# Patient Record
Sex: FEMALE | Race: BLACK OR AFRICAN AMERICAN | Employment: OTHER | URBAN - METROPOLITAN AREA
[De-identification: names, ages, dates, MRNs, and addresses within clinical notes are randomized per-mention and may not be internally consistent; named-entity substitution may affect disease eponyms.]

---

## 2018-04-26 ENCOUNTER — HOSPITAL ENCOUNTER (OUTPATIENT)
Dept: MAMMOGRAPHY | Age: 68
Discharge: HOME | End: 2018-04-26
Attending: INTERNAL MEDICINE
Payer: MEDICARE

## 2018-04-26 DIAGNOSIS — Z12.31 VISIT FOR SCREENING MAMMOGRAM: ICD-10-CM

## 2018-04-26 PROCEDURE — 77067 SCR MAMMO BI INCL CAD: CPT

## 2019-03-17 ENCOUNTER — HOSPITAL ENCOUNTER (EMERGENCY)
Age: 69
Discharge: HOME | End: 2019-03-17
Attending: EMERGENCY MEDICINE
Payer: MEDICARE

## 2019-03-17 ENCOUNTER — APPOINTMENT (OUTPATIENT)
Dept: GENERAL RADIOLOGY | Age: 69
End: 2019-03-17
Attending: PHYSICIAN ASSISTANT
Payer: MEDICARE

## 2019-03-17 VITALS
HEIGHT: 64 IN | TEMPERATURE: 98.5 F | OXYGEN SATURATION: 98 % | RESPIRATION RATE: 18 BRPM | HEART RATE: 87 BPM | WEIGHT: 175 LBS | SYSTOLIC BLOOD PRESSURE: 155 MMHG | DIASTOLIC BLOOD PRESSURE: 85 MMHG | BODY MASS INDEX: 29.88 KG/M2

## 2019-03-17 DIAGNOSIS — N30.00 ACUTE CYSTITIS WITHOUT HEMATURIA: Primary | ICD-10-CM

## 2019-03-17 DIAGNOSIS — R53.83 FATIGUE, UNSPECIFIED TYPE: ICD-10-CM

## 2019-03-17 LAB
ANION GAP SERPL CALC-SCNC: 6 MMOL/L (ref 3–18)
APPEARANCE UR: CLEAR
BACTERIA URNS QL MICRO: ABNORMAL /HPF
BASOPHILS # BLD: 0 K/UL (ref 0–0.1)
BASOPHILS NFR BLD: 0 % (ref 0–2)
BILIRUB UR QL: NEGATIVE
BUN SERPL-MCNC: 12 MG/DL (ref 7–18)
BUN/CREAT SERPL: 18 (ref 12–20)
CALCIUM SERPL-MCNC: 10.1 MG/DL (ref 8.5–10.1)
CHLORIDE SERPL-SCNC: 100 MMOL/L (ref 100–108)
CO2 SERPL-SCNC: 31 MMOL/L (ref 21–32)
COLOR UR: YELLOW
CREAT SERPL-MCNC: 0.68 MG/DL (ref 0.6–1.3)
DIFFERENTIAL METHOD BLD: ABNORMAL
EOSINOPHIL # BLD: 0.1 K/UL (ref 0–0.4)
EOSINOPHIL NFR BLD: 2 % (ref 0–5)
EPITH CASTS URNS QL MICRO: ABNORMAL /LPF (ref 0–5)
ERYTHROCYTE [DISTWIDTH] IN BLOOD BY AUTOMATED COUNT: 14.6 % (ref 11.6–14.5)
GLUCOSE SERPL-MCNC: 107 MG/DL (ref 74–99)
GLUCOSE UR STRIP.AUTO-MCNC: NEGATIVE MG/DL
HCT VFR BLD AUTO: 34.7 % (ref 35–45)
HGB BLD-MCNC: 12 G/DL (ref 12–16)
HGB UR QL STRIP: NEGATIVE
KETONES UR QL STRIP.AUTO: NEGATIVE MG/DL
LEUKOCYTE ESTERASE UR QL STRIP.AUTO: ABNORMAL
LYMPHOCYTES # BLD: 2 K/UL (ref 0.9–3.6)
LYMPHOCYTES NFR BLD: 25 % (ref 21–52)
MCH RBC QN AUTO: 29.6 PG (ref 24–34)
MCHC RBC AUTO-ENTMCNC: 34.6 G/DL (ref 31–37)
MCV RBC AUTO: 85.7 FL (ref 74–97)
MONOCYTES # BLD: 0.9 K/UL (ref 0.05–1.2)
MONOCYTES NFR BLD: 11 % (ref 3–10)
MUCOUS THREADS URNS QL MICRO: ABNORMAL /LPF
NEUTS SEG # BLD: 4.9 K/UL (ref 1.8–8)
NEUTS SEG NFR BLD: 62 % (ref 40–73)
NITRITE UR QL STRIP.AUTO: NEGATIVE
PH UR STRIP: 5 [PH] (ref 5–8)
PLATELET # BLD AUTO: 312 K/UL (ref 135–420)
PMV BLD AUTO: 10.4 FL (ref 9.2–11.8)
POTASSIUM SERPL-SCNC: 3.3 MMOL/L (ref 3.5–5.5)
PROT UR STRIP-MCNC: NEGATIVE MG/DL
RBC # BLD AUTO: 4.05 M/UL (ref 4.2–5.3)
RBC #/AREA URNS HPF: ABNORMAL /HPF (ref 0–5)
SODIUM SERPL-SCNC: 137 MMOL/L (ref 136–145)
SP GR UR REFRACTOMETRY: 1.02 (ref 1–1.03)
UROBILINOGEN UR QL STRIP.AUTO: 0.2 EU/DL (ref 0.2–1)
WBC # BLD AUTO: 8 K/UL (ref 4.6–13.2)
WBC URNS QL MICRO: ABNORMAL /HPF (ref 0–4)

## 2019-03-17 PROCEDURE — 85025 COMPLETE CBC W/AUTO DIFF WBC: CPT

## 2019-03-17 PROCEDURE — 80048 BASIC METABOLIC PNL TOTAL CA: CPT

## 2019-03-17 PROCEDURE — 71046 X-RAY EXAM CHEST 2 VIEWS: CPT

## 2019-03-17 PROCEDURE — 81001 URINALYSIS AUTO W/SCOPE: CPT

## 2019-03-17 PROCEDURE — 99283 EMERGENCY DEPT VISIT LOW MDM: CPT

## 2019-03-17 RX ORDER — CEPHALEXIN 500 MG/1
500 CAPSULE ORAL 2 TIMES DAILY
Qty: 14 CAP | Refills: 0 | Status: SHIPPED | OUTPATIENT
Start: 2019-03-17 | End: 2019-03-24

## 2019-03-17 RX ORDER — FENOFIBRATE 145 MG/1
TABLET, COATED ORAL DAILY
COMMUNITY
End: 2019-10-23 | Stop reason: CLARIF

## 2019-03-17 RX ORDER — HYDROCHLOROTHIAZIDE 25 MG/1
25 TABLET ORAL DAILY
COMMUNITY
End: 2020-01-17

## 2019-03-17 RX ORDER — AMLODIPINE BESYLATE 10 MG/1
10 TABLET ORAL DAILY
COMMUNITY
End: 2022-04-11

## 2019-03-17 RX ORDER — GUAIFENESIN 100 MG/5ML
81 LIQUID (ML) ORAL DAILY
COMMUNITY

## 2019-03-17 NOTE — DISCHARGE INSTRUCTIONS

## 2019-03-17 NOTE — ED PROVIDER NOTES
EMERGENCY DEPARTMENT HISTORY AND PHYSICAL EXAM    4:00 PM      Date: 3/17/2019  Patient Name: Padma Eduardo    History of Presenting Illness     Chief Complaint   Patient presents with    Flu Like Symptoms         History Provided By: Patient    Chief Complaint: fatigue, body aches         Additional History (Context): Padma Eduardo is a 70 y.o. female with hypertension and hyperlipidemia who presents with fatigue and body aches x 1 week. She has intermittent congestion, mild dry cough, fatigue and body aches. Fatigue and body aches are bothering her the most.  Her symptoms seem to resolve and then recur. She denies nausea vomiting abdominal pain dysuria chest pain sore throat fever ear pain. Has been taking Coricidin with mild improvement, but symptoms continue to recur. PCP: Alona Figueroa MD    Current Outpatient Medications   Medication Sig Dispense Refill    hydroCHLOROthiazide (HYDRODIURIL) 25 mg tablet Take 25 mg by mouth daily.  amLODIPine (NORVASC) 10 mg tablet Take 10 mg by mouth daily.  fenofibrate nanocrystallized (TRICOR) 145 mg tablet Take  by mouth daily.  aspirin 81 mg chewable tablet Take 81 mg by mouth daily.  cephALEXin (KEFLEX) 500 mg capsule Take 1 Cap by mouth two (2) times a day for 7 days. 14 Cap 0       Past History     Past Medical History:  Past Medical History:   Diagnosis Date    Depression     High cholesterol     Hypertension        Past Surgical History:  No past surgical history on file. Family History:  No family history on file. Social History:  Social History     Tobacco Use    Smoking status: Not on file   Substance Use Topics    Alcohol use: Not on file    Drug use: Not on file       Allergies:  No Known Allergies      Review of Systems       Review of Systems   Constitutional: Positive for fatigue. Negative for fever. HENT: Positive for congestion. Negative for facial swelling. Eyes: Negative for visual disturbance. Respiratory: Positive for cough. Negative for shortness of breath. Cardiovascular: Negative for chest pain. Gastrointestinal: Negative for abdominal pain. Genitourinary: Negative for dysuria. Musculoskeletal: Positive for myalgias. Negative for neck pain. Skin: Negative for rash. Neurological: Negative for dizziness. Psychiatric/Behavioral: Negative for confusion. All other systems reviewed and are negative. Physical Exam     Visit Vitals  /85 (BP 1 Location: Left arm, BP Patient Position: Sitting)   Pulse 87   Temp 98.5 °F (36.9 °C)   Resp 18   Ht 5' 4\" (1.626 m)   Wt 79.4 kg (175 lb)   SpO2 98%   BMI 30.04 kg/m²         Physical Exam   Constitutional: She is oriented to person, place, and time. She appears well-developed and well-nourished. HENT:   Head: Normocephalic. Right Ear: Tympanic membrane, external ear and ear canal normal.   Left Ear: Tympanic membrane, external ear and ear canal normal.   Nose: Nose normal. Right sinus exhibits no maxillary sinus tenderness and no frontal sinus tenderness. Left sinus exhibits no maxillary sinus tenderness and no frontal sinus tenderness. Mouth/Throat: Uvula is midline, oropharynx is clear and moist and mucous membranes are normal. No oropharyngeal exudate, posterior oropharyngeal edema, posterior oropharyngeal erythema or tonsillar abscesses. Eyes: Conjunctivae are normal.   Neck: Normal range of motion. Neck supple. Cardiovascular: Normal rate, regular rhythm and normal heart sounds. Pulmonary/Chest: Effort normal and breath sounds normal. She has no wheezes. She has no rales. Musculoskeletal: Normal range of motion. Lymphadenopathy:     She has no cervical adenopathy. Neurological: She is alert and oriented to person, place, and time. Skin: Skin is warm and dry. Psychiatric: She has a normal mood and affect. Nursing note and vitals reviewed.         Diagnostic Study Results     Labs -  Recent Results (from the past 12 hour(s))   URINALYSIS W/ RFLX MICROSCOPIC    Collection Time: 03/17/19  3:20 PM   Result Value Ref Range    Color YELLOW      Appearance CLEAR      Specific gravity 1.016 1.005 - 1.030      pH (UA) 5.0 5.0 - 8.0      Protein NEGATIVE  NEG mg/dL    Glucose NEGATIVE  NEG mg/dL    Ketone NEGATIVE  NEG mg/dL    Bilirubin NEGATIVE  NEG      Blood NEGATIVE  NEG      Urobilinogen 0.2 0.2 - 1.0 EU/dL    Nitrites NEGATIVE  NEG      Leukocyte Esterase MODERATE (A) NEG     URINE MICROSCOPIC ONLY    Collection Time: 03/17/19  3:20 PM   Result Value Ref Range    WBC 5 to 10 0 - 4 /hpf    RBC NONE 0 - 5 /hpf    Epithelial cells FEW 0 - 5 /lpf    Bacteria 1+ (A) NEG /hpf    Mucus 2+ (A) NEG /lpf   CBC WITH AUTOMATED DIFF    Collection Time: 03/17/19  4:15 PM   Result Value Ref Range    WBC 8.0 4.6 - 13.2 K/uL    RBC 4.05 (L) 4.20 - 5.30 M/uL    HGB 12.0 12.0 - 16.0 g/dL    HCT 34.7 (L) 35.0 - 45.0 %    MCV 85.7 74.0 - 97.0 FL    MCH 29.6 24.0 - 34.0 PG    MCHC 34.6 31.0 - 37.0 g/dL    RDW 14.6 (H) 11.6 - 14.5 %    PLATELET 689 390 - 567 K/uL    MPV 10.4 9.2 - 11.8 FL    NEUTROPHILS 62 40 - 73 %    LYMPHOCYTES 25 21 - 52 %    MONOCYTES 11 (H) 3 - 10 %    EOSINOPHILS 2 0 - 5 %    BASOPHILS 0 0 - 2 %    ABS. NEUTROPHILS 4.9 1.8 - 8.0 K/UL    ABS. LYMPHOCYTES 2.0 0.9 - 3.6 K/UL    ABS. MONOCYTES 0.9 0.05 - 1.2 K/UL    ABS. EOSINOPHILS 0.1 0.0 - 0.4 K/UL    ABS.  BASOPHILS 0.0 0.0 - 0.1 K/UL    DF AUTOMATED     METABOLIC PANEL, BASIC    Collection Time: 03/17/19  4:15 PM   Result Value Ref Range    Sodium 137 136 - 145 mmol/L    Potassium 3.3 (L) 3.5 - 5.5 mmol/L    Chloride 100 100 - 108 mmol/L    CO2 31 21 - 32 mmol/L    Anion gap 6 3.0 - 18 mmol/L    Glucose 107 (H) 74 - 99 mg/dL    BUN 12 7.0 - 18 MG/DL    Creatinine 0.68 0.6 - 1.3 MG/DL    BUN/Creatinine ratio 18 12 - 20      GFR est AA >60 >60 ml/min/1.73m2    GFR est non-AA >60 >60 ml/min/1.73m2    Calcium 10.1 8.5 - 10.1 MG/DL       Radiologic Studies -   XR CHEST PA LAT (Results Pending)         Medical Decision Making   I am the first provider for this patient. I reviewed the vital signs, available nursing notes, past medical history, past surgical history, family history and social history. Vital Signs-Reviewed the patient's vital signs. Records Reviewed: Nursing Notes (Time of Review: 4:00 PM)    ED Course: Progress Notes, Reevaluation, and Consults:      Provider Notes (Medical Decision Making): MDM  Number of Diagnoses or Management Options  Acute cystitis without hematuria:   Fatigue, unspecified type:   Diagnosis management comments: Well-appearing 69-year-old female comes in complaining of fatigue body aches and nasal congestion times 1 week. Afebrile vitals are stable, no signs of sepsis. Likely viral, will workup to rule out bacterial infection. No abdominal or chest pain today. 5:16 PM  CXR for infection. Urine is positive which could be the cause of the body aches and fatigue. No other signs of infection. We will cover with antibiotics and instructed to follow-up with primary care. WBC WNL. Diagnosis     Clinical Impression:   1. Acute cystitis without hematuria    2. Fatigue, unspecified type        Disposition: Discharged      Follow-up Information     Follow up With Specialties Details Why Contact Info    Kashif David MD Internal Medicine In 2 days If symptoms do not improve [address] [facility] [address]  [phone]      [facility] EMERGENCY DEPT Emergency Medicine  Immediately if symptoms worsen [address]  [phone]              Medication List      START taking these medications    cephALEXin 500 mg capsule  Commonly known as:  KEFLEX  Take 1 Cap by mouth two (2) times a day for 7 days.         ASK your doctor about these medications    aspirin 81 mg chewable tablet     hydroCHLOROthiazide 25 mg tablet  Commonly known as:  HYDRODIURIL     NORVASC 10 mg tablet  Generic drug:  amLODIPine TRICOR 145 mg tablet  Generic drug:  fenofibrate nanocrystallized           Where to Get Your Medications      Information about where to get these medications is not yet available    Ask your nurse or doctor about these medications  · cephALEXin 500 mg capsule       _______________________________    Attestations:  Scribe Attestation     Braxton RANJAN Magallanes PA-C acting as a scribe for and in the presence of JUSTO De La Vega      March 17, 2019 at 5:16 PM       Provider Attestation:      I personally performed the services described in the documentation, reviewed the documentation, as recorded by the scribe in my presence, and it accurately and completely records my words and actions.  March 17, 2019 at 5:16 PM - JUSTO De La Vega  _______________________________

## 2019-08-19 ENCOUNTER — HOSPITAL ENCOUNTER (EMERGENCY)
Age: 69
Discharge: HOME | End: 2019-08-19
Attending: EMERGENCY MEDICINE
Payer: MEDICAID

## 2019-08-19 ENCOUNTER — APPOINTMENT (OUTPATIENT)
Dept: GENERAL RADIOLOGY | Age: 69
End: 2019-08-19
Attending: PHYSICIAN ASSISTANT
Payer: MEDICAID

## 2019-08-19 VITALS
HEART RATE: 84 BPM | TEMPERATURE: 98.4 F | OXYGEN SATURATION: 95 % | SYSTOLIC BLOOD PRESSURE: 145 MMHG | DIASTOLIC BLOOD PRESSURE: 88 MMHG | RESPIRATION RATE: 16 BRPM

## 2019-08-19 DIAGNOSIS — M17.12 OSTEOARTHRITIS OF LEFT KNEE, UNSPECIFIED OSTEOARTHRITIS TYPE: ICD-10-CM

## 2019-08-19 DIAGNOSIS — M25.562 ACUTE PAIN OF LEFT KNEE: Primary | ICD-10-CM

## 2019-08-19 PROCEDURE — 73562 X-RAY EXAM OF KNEE 3: CPT

## 2019-08-19 PROCEDURE — 99281 EMR DPT VST MAYX REQ PHY/QHP: CPT

## 2019-08-19 RX ORDER — DICLOFENAC SODIUM 10 MG/G
4 GEL TOPICAL 4 TIMES DAILY
Qty: 100 G | Refills: 0 | Status: SHIPPED | OUTPATIENT
Start: 2019-08-19 | End: 2019-10-08 | Stop reason: SDUPTHER

## 2019-08-19 NOTE — DISCHARGE INSTRUCTIONS
Patient Education        Knee Arthritis: Care Instructions  Your Care Instructions    Knee arthritis is a breakdown of the cartilage that cushions your knee joint. When the cartilage wears down, your bones rub against each other. This causes pain and stiffness. Knee arthritis tends to get worse with time. Treatment for knee arthritis involves reducing pain, making the leg muscles stronger, and staying at a healthy body weight. The treatment usually does not improve the health of the cartilage, but it can reduce pain and improve how well your knee works. You can take simple measures to protect your knee joints, ease your pain, and help you stay active. Follow-up care is a key part of your treatment and safety. Be sure to make and go to all appointments, and call your doctor if you are having problems. It's also a good idea to know your test results and keep a list of the medicines you take. How can you care for yourself at home? · Know that knee arthritis will cause more pain on some days than on others. · Stay at a healthy weight. Lose weight if you are overweight. When you stand up, the pressure on your knees from every pound of body weight is multiplied four times. So if you lose 10 pounds, you will reduce the pressure on your knees by 40 pounds. · Talk to your doctor or physical therapist about exercises that will help ease joint pain. ? Stretch to help prevent stiffness and to prevent injury before you exercise. You may enjoy gentle forms of yoga to help keep your knee joints and muscles flexible. ? Walk instead of jog.  ? Ride a bike. This makes your thigh muscles stronger and takes pressure off your knee. ? Wear well-fitting and comfortable shoes. ? Exercise in chest-deep water. This can help you exercise longer with less pain. ? Avoid exercises that include squatting or kneeling. They can put a lot of strain on your knees.   ? Talk to your doctor to make sure that the exercise you do is not making the arthritis worse. · Do not sit for long periods of time. Try to walk once in a while to keep your knee from getting stiff. · Ask your doctor or physical therapist whether shoe inserts may reduce your arthritis pain. · If you can afford it, get new athletic shoes at least every year. This can help reduce the strain on your knees. · Use a device to help you do everyday activities. ? A cane or walking stick can help you keep your balance when you walk. Hold the cane or walking stick in the hand opposite the painful knee. ? If you feel like you may fall when you walk, try using crutches or a front-wheeled walker. These can prevent falls that could cause more damage to your knee. ? A knee brace may help keep your knee stable and prevent pain. ? You also can use other things to make life easier, such as a higher toilet seat and handrails in the bathtub or shower. · Take pain medicines exactly as directed. ? Do not wait until you are in severe pain. You will get better results if you take it sooner. ? If you are not taking a prescription pain medicine, take an over-the-counter medicine such as acetaminophen (Tylenol), ibuprofen (Advil, Motrin), or naproxen (Aleve). Read and follow all instructions on the label. ? Do not take two or more pain medicines at the same time unless the doctor told you to. Many pain medicines have acetaminophen, which is Tylenol. Too much acetaminophen (Tylenol) can be harmful. ? Tell your doctor if you take a blood thinner, have diabetes, or have allergies to shellfish. · Ask your doctor if you might benefit from a shot of steroid medicine into your knee. This may provide pain relief for several months. · Many people take the supplements glucosamine and chondroitin for osteoarthritis. Some people feel they help, but the medical research does not show that they work. Talk to your doctor before you take these supplements. When should you call for help?   Call your doctor now or seek immediate medical care if:    · You have sudden swelling, warmth, or pain in your knee.     · You have knee pain and a fever or rash.     · You have such bad pain that you cannot use your knee.    Watch closely for changes in your health, and be sure to contact your doctor if you have any problems. Where can you learn more? Go to http://bri-khari.info/. Enter K611 in the search box to learn more about \"Knee Arthritis: Care Instructions. \"  Current as of: April 1, 2019  Content Version: 12.1  © 4063-7723 [employer]. Care instructions adapted under license by [employer] (which disclaims liability or warranty for this information). If you have questions about a medical condition or this instruction, always ask your healthcare professional. [address] any warranty or liability for your use of this information.

## 2019-08-19 NOTE — ED TRIAGE NOTES
Patient arrived from home c/o left knee pain. Patient states she does not have arthritis or gout. Patient ambulatory with cane.  Patient denies fall

## 2019-08-19 NOTE — ED PROVIDER NOTES
EMERGENCY DEPARTMENT HISTORY AND PHYSICAL EXAM    Date: 8/19/2019  Patient Name: Teresa Pickett    History of Presenting Illness     Chief Complaint   Patient presents with    Knee Pain         History Provided By: Patient    Chief Complaint: knee pain  Duration:  Weeks  Timing:  Constant  Location: left knee  Quality: Aching  Severity: Moderate  Modifying Factors: walking worsens pain  Associated Symptoms: denies any other associated signs or symptoms      Additional History (Context): Teresa Pickett is a 70 y.o. female with hypertension and hyperlipidemia who presents with left knee pain for several weeks. Pain worsens when she walks. Has not taken anything for the pain. Denies any swelling, injuries, falls, chest pain, shortness of breath, skin changes, wounds or any other complaints or symptoms. Patient states several months ago she did fall and sustained a large scrape on her knee. She states she was not seen for this injury. PCP: Adolfo Whittington NP    Current Outpatient Medications   Medication Sig Dispense Refill    hydroCHLOROthiazide (HYDRODIURIL) 25 mg tablet Take 25 mg by mouth daily.  amLODIPine (NORVASC) 10 mg tablet Take 10 mg by mouth daily.  fenofibrate nanocrystallized (TRICOR) 145 mg tablet Take  by mouth daily.  aspirin 81 mg chewable tablet Take 81 mg by mouth daily. Past History     Past Medical History:  Past Medical History:   Diagnosis Date    Depression     High cholesterol     Hypertension        Past Surgical History:  No past surgical history on file. Family History:  No family history on file. Social History:  Social History     Tobacco Use    Smoking status: Not on file   Substance Use Topics    Alcohol use: Not on file    Drug use: Not on file       Allergies:   Allergies   Allergen Reactions    Aleve [Naproxen Sodium] Unable to Obtain    Bactrim [Sulfamethoprim] Diarrhea    Feldene [Piroxicam] Unknown (comments)     Shaking           Review of Systems   Review of Systems   Constitutional: Negative for chills and fever. Musculoskeletal: Positive for arthralgias. Negative for joint swelling. All other systems reviewed and are negative. All Other Systems Negative  Physical Exam     Vitals:    08/19/19 1422   BP: 145/88   Pulse: 84   Resp: 16   Temp: 98.4 °F (36.9 °C)   SpO2: 95%     Physical Exam   Constitutional: She appears well-developed and well-nourished. No distress. HENT:   Head: Normocephalic and atraumatic. Mouth/Throat: Oropharynx is clear and moist.   Eyes: Conjunctivae are normal.   Musculoskeletal:        Left knee: She exhibits normal range of motion, no swelling, no effusion, no deformity, no erythema, normal patellar mobility and no bony tenderness. Tenderness found. Lateral joint line tenderness noted. Left calf: soft, non tender, no redness   Neurological: She is alert. Skin: Skin is warm and dry. She is not diaphoretic. Psychiatric: She has a normal mood and affect. Diagnostic Study Results     Labs -   No results found for this or any previous visit (from the past 12 hour(s)). Radiologic Studies -   XR KNEE LT 3 V    (Results Pending)     CT Results  (Last 48 hours)    None        CXR Results  (Last 48 hours)    None            Medical Decision Making   I am the first provider for this patient. I reviewed the vital signs, available nursing notes, past medical history, past surgical history, family history and social history. Vital Signs-Reviewed the patient's vital signs. Pulse Oximetry Analysis - 91% on RA will recheck  Records Reviewed: Nursing Notes    Procedures:  Procedures    Provider Notes (Medical Decision Making): Pt c/o atraumatic left knee pain. Will xray. Likely arthritis. 3:17 PM DJD noted on xray of knee. Will d/c with ortho and pcp f/u. Pt to get knee brace and take otc tylenol.  SITA Michele      MED RECONCILIATION:  No current facility-administered medications for this encounter. Current Outpatient Medications   Medication Sig    hydroCHLOROthiazide (HYDRODIURIL) 25 mg tablet Take 25 mg by mouth daily.  amLODIPine (NORVASC) 10 mg tablet Take 10 mg by mouth daily.  fenofibrate nanocrystallized (TRICOR) 145 mg tablet Take  by mouth daily.  aspirin 81 mg chewable tablet Take 81 mg by mouth daily. Disposition:  home    DISCHARGE NOTE:     Pt has been reexamined. Patient has no new complaints, changes, or physical findings. Care plan outlined and precautions discussed. Results of xray were reviewed with the patient. All medications were reviewed with the patient; will d/c home. All of pt's questions and concerns were addressed. Patient was instructed and agrees to follow up with pcp/ortho, as well as to return to the ED upon further deterioration. Patient is ready to go home. Follow-up Information     Follow up With Specialties Details Why Contact Info    Kathryn Hamm MD Orthopedic Surgery   [address] [facility]  [address]  [place] [address] [facility] [address] [facility] [address] [phone]            Current Discharge Medication List      CONTINUE these medications which have NOT CHANGED    Details   hydroCHLOROthiazide (HYDRODIURIL) 25 mg tablet Take 25 mg by mouth daily. amLODIPine (NORVASC) 10 mg tablet Take 10 mg by mouth daily. fenofibrate nanocrystallized (TRICOR) 145 mg tablet Take  by mouth daily. aspirin 81 mg chewable tablet Take 81 mg by mouth daily. Diagnosis     Clinical Impression:   1. Acute pain of left knee    2.  Osteoarthritis of left knee, unspecified osteoarthritis type

## 2019-08-26 ENCOUNTER — OFFICE VISIT (OUTPATIENT)
Dept: ORTHOPEDIC SURGERY | Facility: CLINIC | Age: 69
End: 2019-08-26

## 2019-08-26 VITALS
DIASTOLIC BLOOD PRESSURE: 73 MMHG | WEIGHT: 172.2 LBS | HEIGHT: 64 IN | SYSTOLIC BLOOD PRESSURE: 169 MMHG | HEART RATE: 77 BPM | TEMPERATURE: 98 F | RESPIRATION RATE: 16 BRPM | BODY MASS INDEX: 29.4 KG/M2 | OXYGEN SATURATION: 99 %

## 2019-08-26 DIAGNOSIS — G89.29 CHRONIC PAIN OF LEFT KNEE: ICD-10-CM

## 2019-08-26 DIAGNOSIS — L29.9 PRURITUS: ICD-10-CM

## 2019-08-26 DIAGNOSIS — M17.12 PRIMARY OSTEOARTHRITIS OF LEFT KNEE: Primary | ICD-10-CM

## 2019-08-26 DIAGNOSIS — T07.XXXA MULTIPLE EXCORIATIONS: ICD-10-CM

## 2019-08-26 DIAGNOSIS — M25.562 CHRONIC PAIN OF LEFT KNEE: ICD-10-CM

## 2019-08-26 RX ORDER — BETAMETHASONE SODIUM PHOSPHATE AND BETAMETHASONE ACETATE 3; 3 MG/ML; MG/ML
6 INJECTION, SUSPENSION INTRA-ARTICULAR; INTRALESIONAL; INTRAMUSCULAR; SOFT TISSUE ONCE
Qty: 0.5 ML | Refills: 0
Start: 2019-08-26 | End: 2019-08-26

## 2019-08-26 NOTE — PROGRESS NOTES
Patient: Mica Esposito                MRN: [id number]       SSN: [id number]  YOB: 1950        AGE: 78 y.o. SEX: female    PCP: Krystal Domingo NP  08/26/19    Chief Complaint   Patient presents with    Knee Pain     left     HISTORY:  Mica Esposito is a 78 y.o. female who is seen for left knee pain. She has been experiencing knee pain for the past 2 weeks. She does not recall any knee injury. She notes pain with standing, walking, and stair climbing. She experiences startup pain after sitting. She states she previously broke her right foot many years ago--did not injure her knee at that time. Pain Assessment  8/26/2019   Location of Pain Knee   Location Modifiers Left   Severity of Pain 6   Quality of Pain Throbbing;Aching; Sharp   Duration of Pain A few minutes   Frequency of Pain Several times daily   Frequency of Pain Comment depending on how much she is on her feet   Aggravating Factors Stairs; Walking;Standing   Limiting Behavior Yes   Relieving Factors Rest;Elevation     Occupation, etc:  Ms. 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 She lives alone. She has no children. She has one cat named Miss Theresa Hinds. She says Miss Theresa Hinds is a diva. Ms. Tom Potter weighs 172 lbs and is 5'4\" tall. No results found for: HBA1C, HGBE8, NCA7BQUZ, SBG5CHZO, VXU3UGCR  Weight Metrics 8/26/2019 3/17/2019 7/28/2016   Weight 172 lb 3.2 oz 175 lb 187 lb   BMI 29.56 kg/m2 30.04 kg/m2 32.08 kg/m2       There is no problem list on file for this patient. REVIEW OF SYSTEMS: All Below are Negative except: See HPI   Constitutional: negative for fever, chills, and weight loss. Cardiovascular: negative for chest pain, claudication, leg swelling, SOB, GARCIA   Gastrointestinal: Negative for pain, N/V/C/D, Blood in stool or urine, dysuria, hematuria, incontinence, pelvic pain.    Musculoskeletal: See HPI   Neurological: Negative for dizziness and weakness. Negative for headaches, Visual changes, confusion, seizures   Phychiatric/Behavioral: Negative for depression, memory loss, substance abuse. Extremities: Negative for hair changes, rash, or skin lesion changes. Hematologic: Negative for bleeding problems, bruising, pallor or swollen lymph nodes   Peripheral Vascular: No calf pain, no circulation deficits.     Social History     Socioeconomic History    Marital status: SINGLE     Spouse name: Not on file    Number of children: Not on file    Years of education: Not on file    Highest education level: Not on file   Occupational History    Not on file   Social Needs    Financial resource strain: Not on file    Food insecurity:     Worry: Not on file     Inability: Not on file    Transportation needs:     Medical: Not on file     Non-medical: Not on file   Tobacco Use    Smoking status: Never Smoker    Smokeless tobacco: Never Used   Substance and Sexual Activity    Alcohol use: Not Currently    Drug use: Never    Sexual activity: Not Currently   Lifestyle    Physical activity:     Days per week: Not on file     Minutes per session: Not on file    Stress: Not on file   Relationships    Social connections:     Talks on phone: Not on file     Gets together: Not on file     Attends Yarsanism service: Not on file     Active member of club or organization: Not on file     Attends meetings of clubs or organizations: Not on file     Relationship status: Not on file    Intimate partner violence:     Fear of current or ex partner: Not on file     Emotionally abused: Not on file     Physically abused: Not on file     Forced sexual activity: Not on file   Other Topics Concern    Not on file   Social History Narrative    Not on file      Allergies   Allergen Reactions    Aleve [Naproxen Sodium] Unable to Obtain    Bactrim [Sulfamethoprim] Diarrhea    Feldene [Piroxicam] Unknown (comments) Shaking        Current Outpatient Medications   Medication Sig    diclofenac (VOLTAREN) 1 % gel Apply 4 g to affected area four (4) times daily.  hydroCHLOROthiazide (HYDRODIURIL) 25 mg tablet Take 25 mg by mouth daily.  amLODIPine (NORVASC) 10 mg tablet Take 10 mg by mouth daily.  fenofibrate nanocrystallized (TRICOR) 145 mg tablet Take  by mouth daily.  aspirin 81 mg chewable tablet Take 81 mg by mouth daily. No current facility-administered medications for this visit.        PHYSICAL EXAMINATION:  Visit Vitals  /73 (BP 1 Location: Left arm, BP Patient Position: Sitting)   Pulse 77   Temp 98 °F (36.7 °C) (Oral)   Resp 16   Ht 5' 4\" (1.626 m)   Wt 172 lb 3.2 oz (78.1 kg)   SpO2 99%   BMI 29.56 kg/m²      ORTHO EXAMINATION:  Examination Right knee Left knee   Skin Intact Intact, healing excoriations over patellar tendon   Range of motion 120-0 120-0   Effusion - -   Medial joint line tenderness - +   Lateral joint line tenderness - -   Popliteal tenderness - +   Osteophytes palpable - -   Melvas - -   Patella crepitus - -   Anterior drawer - -   Lateral laxity - -   Medial laxity - -   Varus deformity - -   Valgus deformity - -   Pretibial edema - -   Calf tenderness - -     Using crutches   Wearing left soft knee sleeve    TIME OUT:  Chart reviewed for the following:   IJaneen MD, have reviewed the History, Physical and updated the Allergic reactions for [address] [place] [address] performed immediately prior to start of procedure:  Amparo Holloway MD, have performed the following reviews on [facility] prior to the start of the procedure:          * Patient was identified by name and date of birth   * Agreement on procedure being performed was verified  * Risks and Benefits explained to the patient  * Procedure site verified and marked as necessary  * Patient was positioned for comfort  * Consent was obtained     Time: 2:10 PM     Date of procedure: 8/26/2019  Procedure performed by:  Debra Rankin MD  Ms. Alvarado tolerated the procedure well with no complications. RADIOGRAPHS:  XR LEFT KNEE 8/19/19 [facility]  IMPRESSION:   No acute findings. Narrowing patellofemoral joint space.  -I have independently reviewed these images during this office visit. -Dr. Mynor Ramon:  Three views - No fractures, no effusion, mild joint space narrowing, no osteophytes present. Kellgren Andres grade 1, significant patellofemoral narrowing     IMPRESSION:      ICD-10-CM ICD-9-CM    1. Primary osteoarthritis of left knee M17.12 715.16 betamethasone (CELESTONE SOLUSPAN) 6 mg/mL injection      BETAMETHASONE ACETATE & SODIUM PHOSPHATE INJECTION 3 MG EA.      DRAIN/INJECT LARGE JOINT/BURSA      REFERRAL TO PHYSICAL THERAPY   2. Chronic pain of left knee M25.562 719.46 betamethasone (CELESTONE SOLUSPAN) 6 mg/mL injection    G89.29 338.29 BETAMETHASONE ACETATE & SODIUM PHOSPHATE INJECTION 3 MG EA.      DRAIN/INJECT LARGE JOINT/BURSA      REFERRAL TO PHYSICAL THERAPY   3. Pruritus L29.9 698.9    4. Multiple excoriations T07. XXXA 919.8      PLAN:  After discussing treatment options, patient's right left knee was injected with 4 cc Marcaine and 1/2 cc Celestone. There is no need for surgery at this time. Use soft knee sleeve PRN. She will use cortisone cream PRN to avoid excoriations. Start PT. She will follow up as needed.      Scribed by Dutch Montoya ([address]) as dictated by Debra Rankin MD

## 2019-08-30 ENCOUNTER — HOSPITAL ENCOUNTER (EMERGENCY)
Age: 69
Discharge: HOME | End: 2019-08-30
Attending: EMERGENCY MEDICINE
Payer: MEDICAID

## 2019-08-30 VITALS
TEMPERATURE: 98.2 F | RESPIRATION RATE: 18 BRPM | HEART RATE: 88 BPM | DIASTOLIC BLOOD PRESSURE: 78 MMHG | SYSTOLIC BLOOD PRESSURE: 150 MMHG | OXYGEN SATURATION: 98 %

## 2019-08-30 DIAGNOSIS — R53.1 WEAKNESS: Primary | ICD-10-CM

## 2019-08-30 DIAGNOSIS — N39.0 URINARY TRACT INFECTION WITHOUT HEMATURIA, SITE UNSPECIFIED: ICD-10-CM

## 2019-08-30 LAB
ANION GAP SERPL CALC-SCNC: 8 MMOL/L (ref 3–18)
APPEARANCE UR: CLEAR
ATRIAL RATE: 88 BPM
BACTERIA URNS QL MICRO: ABNORMAL /HPF
BASOPHILS # BLD: 0 K/UL (ref 0–0.1)
BASOPHILS NFR BLD: 1 % (ref 0–2)
BILIRUB UR QL: NEGATIVE
BUN SERPL-MCNC: 17 MG/DL (ref 7–18)
BUN/CREAT SERPL: 17 (ref 12–20)
CALCIUM SERPL-MCNC: 9.6 MG/DL (ref 8.5–10.1)
CALCULATED P AXIS, ECG09: 58 DEGREES
CALCULATED R AXIS, ECG10: 14 DEGREES
CALCULATED T AXIS, ECG11: 33 DEGREES
CHLORIDE SERPL-SCNC: 101 MMOL/L (ref 100–111)
CO2 SERPL-SCNC: 30 MMOL/L (ref 21–32)
COLOR UR: YELLOW
CREAT SERPL-MCNC: 1 MG/DL (ref 0.6–1.3)
DIAGNOSIS, 93000: NORMAL
DIFFERENTIAL METHOD BLD: ABNORMAL
EOSINOPHIL # BLD: 0.1 K/UL (ref 0–0.4)
EOSINOPHIL NFR BLD: 1 % (ref 0–5)
EPITH CASTS URNS QL MICRO: ABNORMAL /LPF (ref 0–5)
ERYTHROCYTE [DISTWIDTH] IN BLOOD BY AUTOMATED COUNT: 14.8 % (ref 11.6–14.5)
GLUCOSE SERPL-MCNC: 127 MG/DL (ref 74–99)
GLUCOSE UR STRIP.AUTO-MCNC: NEGATIVE MG/DL
HCT VFR BLD AUTO: 37.3 % (ref 35–45)
HGB BLD-MCNC: 13.2 G/DL (ref 12–16)
HGB UR QL STRIP: NEGATIVE
KETONES UR QL STRIP.AUTO: NEGATIVE MG/DL
LEUKOCYTE ESTERASE UR QL STRIP.AUTO: ABNORMAL
LYMPHOCYTES # BLD: 2.2 K/UL (ref 0.9–3.6)
LYMPHOCYTES NFR BLD: 31 % (ref 21–52)
MCH RBC QN AUTO: 30.1 PG (ref 24–34)
MCHC RBC AUTO-ENTMCNC: 35.4 G/DL (ref 31–37)
MCV RBC AUTO: 85.2 FL (ref 74–97)
MONOCYTES # BLD: 0.7 K/UL (ref 0.05–1.2)
MONOCYTES NFR BLD: 10 % (ref 3–10)
MUCOUS THREADS URNS QL MICRO: ABNORMAL /LPF
NEUTS SEG # BLD: 4 K/UL (ref 1.8–8)
NEUTS SEG NFR BLD: 57 % (ref 40–73)
NITRITE UR QL STRIP.AUTO: NEGATIVE
P-R INTERVAL, ECG05: 152 MS
PH UR STRIP: 5.5 [PH] (ref 5–8)
PLATELET # BLD AUTO: 232 K/UL (ref 135–420)
PMV BLD AUTO: 10.8 FL (ref 9.2–11.8)
POTASSIUM SERPL-SCNC: 3.4 MMOL/L (ref 3.5–5.5)
PROT UR STRIP-MCNC: NEGATIVE MG/DL
Q-T INTERVAL, ECG07: 344 MS
QRS DURATION, ECG06: 96 MS
QTC CALCULATION (BEZET), ECG08: 416 MS
RBC # BLD AUTO: 4.38 M/UL (ref 4.2–5.3)
RBC #/AREA URNS HPF: NEGATIVE /HPF (ref 0–5)
SODIUM SERPL-SCNC: 139 MMOL/L (ref 136–145)
SP GR UR REFRACTOMETRY: 1.02 (ref 1–1.03)
TROPONIN I SERPL-MCNC: <0.02 NG/ML (ref 0–0.04)
UROBILINOGEN UR QL STRIP.AUTO: 1 EU/DL (ref 0.2–1)
VENTRICULAR RATE, ECG03: 88 BPM
WBC # BLD AUTO: 7.1 K/UL (ref 4.6–13.2)
WBC URNS QL MICRO: ABNORMAL /HPF (ref 0–4)

## 2019-08-30 PROCEDURE — 81001 URINALYSIS AUTO W/SCOPE: CPT

## 2019-08-30 PROCEDURE — 85025 COMPLETE CBC W/AUTO DIFF WBC: CPT

## 2019-08-30 PROCEDURE — 93005 ELECTROCARDIOGRAM TRACING: CPT

## 2019-08-30 PROCEDURE — 99284 EMERGENCY DEPT VISIT MOD MDM: CPT

## 2019-08-30 PROCEDURE — 84484 ASSAY OF TROPONIN QUANT: CPT

## 2019-08-30 PROCEDURE — 80048 BASIC METABOLIC PNL TOTAL CA: CPT

## 2019-08-30 RX ORDER — NITROFURANTOIN 25; 75 MG/1; MG/1
100 CAPSULE ORAL 2 TIMES DAILY
Qty: 6 CAP | Refills: 0 | Status: SHIPPED | OUTPATIENT
Start: 2019-08-30 | End: 2019-09-02

## 2019-08-30 NOTE — ED TRIAGE NOTES
\"Yesterday morning I started feeling real weak and shaky and I'm still feeling a little dizzy. \" Denies any N/V or chest pain.

## 2019-08-30 NOTE — DISCHARGE INSTRUCTIONS

## 2019-08-30 NOTE — ED PROVIDER NOTES
EMERGENCY DEPARTMENT HISTORY AND PHYSICAL EXAM    3:35 PM      Date: 8/30/2019  Patient Name: Marilu Lindo    History of Presenting Illness     Chief Complaint   Patient presents with    Dizziness    Fatigue     \"I feel a little shaky\"         History Provided By: patient    Additional History (Context): Marilu Lindo is a 78 y.o. female presents with started yesterday when she got up feeling of shakiness unsteadiness like she might pass out lightheadedness. No other symptoms, no fever chest pain shortness of breath belly pain vomiting diarrhea or urinary symptoms. On a previous occasion when she had similar symptoms she was diagnosed with UTI. Her symptoms are constant. Norma Patel PCP: Abdoulaye Hernandez NP    Chief Complaint:   Duration:    Timing:    Location:   Quality:   Severity:   Modifying Factors:   Associated Symptoms:       Current Outpatient Medications   Medication Sig Dispense Refill    nitrofurantoin, macrocrystal-monohydrate, (MACROBID) 100 mg capsule Take 1 Cap by mouth two (2) times a day for 3 days. 6 Cap 0    diclofenac (VOLTAREN) 1 % gel Apply 4 g to affected area four (4) times daily. 100 g 0    hydroCHLOROthiazide (HYDRODIURIL) 25 mg tablet Take 25 mg by mouth daily.  amLODIPine (NORVASC) 10 mg tablet Take 10 mg by mouth daily.  fenofibrate nanocrystallized (TRICOR) 145 mg tablet Take  by mouth daily.  aspirin 81 mg chewable tablet Take 81 mg by mouth daily. Past History     Past Medical History:  Past Medical History:   Diagnosis Date    Depression     High cholesterol     Hypertension        Past Surgical History:  No past surgical history on file. Family History:  No family history on file. Social History:  Social History     Tobacco Use    Smoking status: Never Smoker    Smokeless tobacco: Never Used   Substance Use Topics    Alcohol use: Not Currently    Drug use: Never       Allergies:   Allergies   Allergen Reactions    Aleve [Naproxen Sodium] Unable to Obtain    Bactrim [Sulfamethoprim] Diarrhea    Feldene [Piroxicam] Unknown (comments)     Shaking           Review of Systems     Review of Systems   Constitutional: Negative for diaphoresis and fever. HENT: Negative for congestion and sore throat. Eyes: Negative for pain and itching. Respiratory: Negative for cough and shortness of breath. Cardiovascular: Negative for chest pain and palpitations. Gastrointestinal: Negative for abdominal pain and diarrhea. Endocrine: Negative for polydipsia and polyuria. Genitourinary: Negative for dysuria and hematuria. Musculoskeletal: Negative for arthralgias and myalgias. Skin: Negative for rash and wound. Neurological: Negative for seizures and syncope. Hematological: Does not bruise/bleed easily. Psychiatric/Behavioral: Negative for agitation and hallucinations. Physical Exam       Patient Vitals for the past 12 hrs:   Temp Pulse Resp BP SpO2   08/30/19 1436 98.2 °F (36.8 °C) 92 16 152/81 98 %       Physical Exam   Constitutional: She appears well-developed and well-nourished. HENT:   Head: Normocephalic and atraumatic. Eyes: Conjunctivae are normal. No scleral icterus. Neck: Normal range of motion. Neck supple. No JVD present. Cardiovascular: Normal rate, regular rhythm and normal heart sounds. 4 intact extremity pulses   Pulmonary/Chest: Effort normal and breath sounds normal.   Abdominal: Soft. She exhibits no mass. There is no tenderness. Musculoskeletal: Normal range of motion. Lymphadenopathy:     She has no cervical adenopathy. Neurological: She is alert. She has normal strength. No cranial nerve deficit or sensory deficit. Coordination normal.   Skin: Skin is warm and dry. Nursing note and vitals reviewed.         Diagnostic Study Results   Labs -  Recent Results (from the past 12 hour(s))   EKG, 12 LEAD, INITIAL    Collection Time: 08/30/19  2:43 PM   Result Value Ref Range    Ventricular Rate 88 BPM    Atrial Rate 88 BPM    P-R Interval 152 ms    QRS Duration 96 ms    Q-T Interval 344 ms    QTC Calculation (Bezet) 416 ms    Calculated P Axis 58 degrees    Calculated R Axis 14 degrees    Calculated T Axis 33 degrees    Diagnosis       Normal sinus rhythm  Possible Left atrial enlargement  Nonspecific T wave abnormality  Abnormal ECG  When compared with ECG of 28-JUL-2016 13:52,  No significant change was found  Confirmed by Raghu Harper MD, Roxann Pallas ([id number]) on 8/30/2019 5:30:08 PM     CBC WITH AUTOMATED DIFF    Collection Time: 08/30/19  3:42 PM   Result Value Ref Range    WBC 7.1 4.6 - 13.2 K/uL    RBC 4.38 4.20 - 5.30 M/uL    HGB 13.2 12.0 - 16.0 g/dL    HCT 37.3 35.0 - 45.0 %    MCV 85.2 74.0 - 97.0 FL    MCH 30.1 24.0 - 34.0 PG    MCHC 35.4 31.0 - 37.0 g/dL    RDW 14.8 (H) 11.6 - 14.5 %    PLATELET 689 229 - 074 K/uL    MPV 10.8 9.2 - 11.8 FL    NEUTROPHILS 57 40 - 73 %    LYMPHOCYTES 31 21 - 52 %    MONOCYTES 10 3 - 10 %    EOSINOPHILS 1 0 - 5 %    BASOPHILS 1 0 - 2 %    ABS. NEUTROPHILS 4.0 1.8 - 8.0 K/UL    ABS. LYMPHOCYTES 2.2 0.9 - 3.6 K/UL    ABS. MONOCYTES 0.7 0.05 - 1.2 K/UL    ABS. EOSINOPHILS 0.1 0.0 - 0.4 K/UL    ABS.  BASOPHILS 0.0 0.0 - 0.1 K/UL    DF AUTOMATED     METABOLIC PANEL, BASIC    Collection Time: 08/30/19  3:42 PM   Result Value Ref Range    Sodium 139 136 - 145 mmol/L    Potassium 3.4 (L) 3.5 - 5.5 mmol/L    Chloride 101 100 - 111 mmol/L    CO2 30 21 - 32 mmol/L    Anion gap 8 3.0 - 18 mmol/L    Glucose 127 (H) 74 - 99 mg/dL    BUN 17 7.0 - 18 MG/DL    Creatinine 1.00 0.6 - 1.3 MG/DL    BUN/Creatinine ratio 17 12 - 20      GFR est AA >60 >60 ml/min/1.73m2    GFR est non-AA 55 (L) >60 ml/min/1.73m2    Calcium 9.6 8.5 - 10.1 MG/DL   TROPONIN I    Collection Time: 08/30/19  3:42 PM   Result Value Ref Range    Troponin-I, QT <0.02 0.0 - 0.045 NG/ML   URINALYSIS W/ RFLX MICROSCOPIC    Collection Time: 08/30/19  5:00 PM   Result Value Ref Range    Color YELLOW      Appearance CLEAR      Specific gravity 1.020 1.005 - 1.030      pH (UA) 5.5 5.0 - 8.0      Protein NEGATIVE  NEG mg/dL    Glucose NEGATIVE  NEG mg/dL    Ketone NEGATIVE  NEG mg/dL    Bilirubin NEGATIVE  NEG      Blood NEGATIVE  NEG      Urobilinogen 1.0 0.2 - 1.0 EU/dL    Nitrites NEGATIVE  NEG      Leukocyte Esterase SMALL (A) NEG     URINE MICROSCOPIC ONLY    Collection Time: 08/30/19  5:00 PM   Result Value Ref Range    WBC 4 to 11 0 - 4 /hpf    RBC NEGATIVE  0 - 5 /hpf    Epithelial cells 2+ 0 - 5 /lpf    Bacteria 2+ (A) NEG /hpf    Mucus 3+ (A) NEG /lpf       Radiologic Studies -   No orders to display     No results found. Medications ordered:   Medications - No data to display      Medical Decision Making   Initial Medical Decision Making and DDx:  Evaluate for anemia no renal failure electrolyte abnormality as a cause of her lightheadedness. Not suggestive of stroke syndrome. ED Course: Progress Notes, Reevaluation, and Consults:     6:17 PM Pt reevaluated at this time. Discussed results and findings, as well as, diagnosis and plan for discharge. Follow up with doctors/services listed. Return to the emergency department for alarming symptoms. Pt verbalizes understanding and agreement with plan. All questions addressed. No evidence of acute coronary syndrome anemia alarming electrolyte abnormality renal failure or anemia. She has trace evidence of UTI and says previously when she felt shaky and weak she had a UTI so we will go ahead and treat her for this with a 3-day course of Macrobid. I discussed all this with the patient she is happy with the plan for discharge at this point      I am the first provider for this patient. I reviewed the vital signs, available nursing notes, past medical history, past surgical history, family history and social history.     Patient Vitals for the past 12 hrs:   Temp Pulse Resp BP SpO2   08/30/19 1436 98.2 °F (36.8 °C) 92 16 152/81 98 %       Vital Signs-Reviewed the patient's vital signs.    Pulse Oximetry Analysis, Cardiac Monitor, 12 lead ekg:     Twelve-lead EKG at 2:44 PM, sinus rhythm at 88, nonspecific ST-T abnormality. Telemetry sinus rhythm at 92, oximetry 98% on room air  Interpreted by the EP. Records Reviewed: Nursing notes reviewed (Time of Review: 3:35 PM)    Procedures:   Critical Care Time:   Aspirin: (was aspirin given for stroke?)    Diagnosis     Clinical Impression:   1. Weakness    2. Urinary tract infection without hematuria, site unspecified        Disposition:       Follow-up Information     Follow up With Specialties Details Why Contact Info    Ryan Blue NP Nurse Practitioner In 2 days  [address] [facility] [address] [phone]             Patient's Medications   Start Taking    NITROFURANTOIN, MACROCRYSTAL-MONOHYDRATE, (MACROBID) 100 MG CAPSULE    Take 1 Cap by mouth two (2) times a day for 3 days. Continue Taking    AMLODIPINE (NORVASC) 10 MG TABLET    Take 10 mg by mouth daily. ASPIRIN 81 MG CHEWABLE TABLET    Take 81 mg by mouth daily. DICLOFENAC (VOLTAREN) 1 % GEL    Apply 4 g to affected area four (4) times daily. FENOFIBRATE NANOCRYSTALLIZED (TRICOR) 145 MG TABLET    Take  by mouth daily. HYDROCHLOROTHIAZIDE (HYDRODIURIL) 25 MG TABLET    Take 25 mg by mouth daily.    These Medications have changed    No medications on file   Stop Taking    No medications on file     _______________________________    Notes:    Teresita Ndiyae MD using Dragon dictation      _______________________________

## 2019-09-05 ENCOUNTER — HOSPITAL ENCOUNTER (OUTPATIENT)
Dept: PHYSICAL THERAPY | Age: 69
Discharge: HOME | End: 2019-09-05
Payer: MEDICAID

## 2019-09-05 PROCEDURE — 97161 PT EVAL LOW COMPLEX 20 MIN: CPT

## 2019-09-05 PROCEDURE — 97110 THERAPEUTIC EXERCISES: CPT

## 2019-09-05 NOTE — PROGRESS NOTES
[facility]  [facility]  [address] [place] [address]  [phone] fax    Plan of Care/ Statement of Necessity for Physical Therapy Services    Patient name: Puneet Hansen Start of Care: 2019   Referral source: Genet Aranda MD : 1950    Medical Diagnosis: Unilateral primary osteoarthritis, left knee [M17.12]  Pain in left knee [M25.562]  Payor: [facility] MEDICAID / [facility] Product Type: Managed Care Medicaid /  Onset Date:2019   Treatment Diagnosis: left knee pain   Prior Hospitalization: see medical history Provider#: [id number]   Medications: Verified on Patient summary List    Comorbidities: depression, OA, HTN   Prior Level of Function: I ambulation       The Plan of Care and following information is based on the information from the initial evaluation. Assessment / key information: Patient is a 78 y.o. female who presents to [facility] with a diagnosis of Unilateral primary osteoarthritis, left knee [M17.12]  Pain in left knee [M25.562]. Patient is her own historian. Living situation is as follows: alone in [facility] with 3STE and railing. Occupation: NA. Pt presents with c/o sudden onset left knee pain since late 2019 of unknown cause. Pain increases with bending and WBing activities. She currently uses a [facility] and velcro knee brace when ambulating. She denies LOB/LE buckling/falls. No radicular s/s. No numbness. Pain is localized to the lateral aspect of the left knee and has resulted in decreased ability to ambulate, squat, and transfer. Current Deficits include: pain, decreased mobility, decreased strength, and decreased postural awareness with resulting limitations in ADL's and in functional abilities.    Impairments are as follows:   Pain 5/10 VAS lateral aspect of the left knee  Posture BLE mild pes planus and genu valgum/recurvatum  Observations right lateral trunk lean in stance  Gait antalgic with incorrect sequencing/use/height of [facility] [place] which was adjusted appropriately and instruction on correct  Two point step through sequencing; decreased weight shifting through pelvis and weightbearing in stance LLE with decreased bilateral hip extension and left knee flexion in swing   AROM/PROM   Right knee: WNL  Left knee: WNL  Bilateral hips and ankles grossly WNLs  Knee strength:   flexion 3+ Bilateral LEs  extension 4 bilateral LEs with pain in the left knee with MMT  Hip strength:   Bilateral hip flexion 4-  Ankle strength:   Bilateral PF 3/5   Optional Tests:  Patellar Positioning/Tracking/Mobility  +hypomobility left patella with medial and caudal glides  Crepitus present  [x]Yes   []No        Special Tests   +Noble compression test and Clarkes test left knee  Functional Tests   Valgus collapse with squat  Functional Deficits include: decreased ability to ambulate, squat, and perform sit-stands. Patient's FOTO score was a TBA NV/100 indicating decreased function. Patient will benefit from a POC addressing such impairments and limitations in order to improve quality of life and return to [facility].     Evaluation Complexity History HIGH Complexity :3+ comorbidities / personal factors will impact the outcome/ POC ; Examination MEDIUM Complexity : 3 Standardized tests and measures addressing body structure, function, activity limitation and / or participation in recreation  ;Presentation LOW Complexity : Stable, uncomplicated  ;Clinical Decision Making Other outcome measures TBA  LOW   Overall Complexity Rating: LOW   Problem List: pain affecting function, decrease strength, impaired gait/ balance, decrease activity tolerance, decrease flexibility/ joint mobility and decrease transfer abilities     Treatment Plan may include any combination of the following: Therapeutic exercise, Therapeutic activities, Neuromuscular re-education, Physical agent/modality, Gait/balance training, Manual therapy, Patient education, Self Care training, Functional mobility training, Home safety training and Stair training    Patient / Family readiness to learn indicated by: asking questions  Persons(s) to be included in education: patient (P)  Barriers to Learning/Limitations: None  Patient Goal (s): I hope to be able to walk without the cane  Patient Self Reported Health Status: fair  Rehabilitation Potential: good     Short Term Goals: To be accomplished in  2  treatments:  1. Pt will be compliant with HEP for symptom management at home.  Long Term Goals: To be accomplished in  10  treatments:  1. Pt will demonstrate an increased FOTO score to TBA/100 in order to improve function. 2. Pt will be independent with HEP at D/C for self management. 3. Pt will be able to ambulate 250ft continuously without AD/LE buckling/LOB/rest break in order to return to community ambulation and her daily walking regimen  4. Pt will demonstrate increased left knee flexion and extension strength to > 4+/5 in order to assist with ambulation and transfers  5. Pt will demonstrate a proper squat and lift of 10lbs without valgus collapse in order to lift groceries without pain    Frequency / Duration: Patient to be seen 2 times per week for 10 treatments. Patient/ Caregiver education and instruction: Diagnosis, prognosis, exercises   [x]  Plan of care has been reviewed with PTA    The severity rating is based on clinical judgment and the FOTO score. Certification [address], PT 9/5/2019 1:03 PM    ________________________________________________________________________    I certify that the above Therapy Services are being furnished while the patient is under my care. I agree with the treatment plan and certify that this therapy is necessary.     Physician's Signature:____________Date:_________TIME:________    [employer], Date and Time must be completed for valid certification **    Please sign and return to [facility] [address]  [address] [facility]  [phone] fax    To ensure your patient receives the highest quality care and to avoid disruption in therapy please sign and return this plan of care within 21 days. Per Medicaid guidelines if the plan of care is not received within 21 days the patient's care must be put on hold until signed.
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
## 2019-09-07 ENCOUNTER — HOSPITAL ENCOUNTER (EMERGENCY)
Age: 69
Discharge: HOME | End: 2019-09-07
Attending: EMERGENCY MEDICINE
Payer: MEDICAID

## 2019-09-07 VITALS
OXYGEN SATURATION: 99 % | BODY MASS INDEX: 28.32 KG/M2 | WEIGHT: 165 LBS | RESPIRATION RATE: 28 BRPM | TEMPERATURE: 98.3 F | HEART RATE: 79 BPM

## 2019-09-07 DIAGNOSIS — R53.83 FATIGUE, UNSPECIFIED TYPE: Primary | ICD-10-CM

## 2019-09-07 LAB
ALBUMIN SERPL-MCNC: 4 G/DL (ref 3.4–5)
ALBUMIN/GLOB SERPL: 0.8 {RATIO} (ref 0.8–1.7)
ALP SERPL-CCNC: 81 U/L (ref 45–117)
ALT SERPL-CCNC: 21 U/L (ref 13–56)
ANION GAP SERPL CALC-SCNC: 8 MMOL/L (ref 3–18)
APPEARANCE UR: CLEAR
AST SERPL-CCNC: 18 U/L (ref 10–38)
BACTERIA URNS QL MICRO: ABNORMAL /HPF
BASOPHILS # BLD: 0 K/UL (ref 0–0.1)
BASOPHILS NFR BLD: 0 % (ref 0–2)
BILIRUB SERPL-MCNC: 0.3 MG/DL (ref 0.2–1)
BILIRUB UR QL: NEGATIVE
BUN SERPL-MCNC: 17 MG/DL (ref 7–18)
BUN/CREAT SERPL: 22 (ref 12–20)
CALCIUM SERPL-MCNC: 10.1 MG/DL (ref 8.5–10.1)
CHLORIDE SERPL-SCNC: 102 MMOL/L (ref 100–111)
CO2 SERPL-SCNC: 30 MMOL/L (ref 21–32)
COLOR UR: YELLOW
CREAT SERPL-MCNC: 0.79 MG/DL (ref 0.6–1.3)
DIFFERENTIAL METHOD BLD: ABNORMAL
EOSINOPHIL # BLD: 0 K/UL (ref 0–0.4)
EOSINOPHIL NFR BLD: 1 % (ref 0–5)
EPITH CASTS URNS QL MICRO: ABNORMAL /LPF (ref 0–5)
ERYTHROCYTE [DISTWIDTH] IN BLOOD BY AUTOMATED COUNT: 14.9 % (ref 11.6–14.5)
GLOBULIN SER CALC-MCNC: 5.2 G/DL (ref 2–4)
GLUCOSE SERPL-MCNC: 103 MG/DL (ref 74–99)
GLUCOSE UR STRIP.AUTO-MCNC: NEGATIVE MG/DL
HCT VFR BLD AUTO: 35.7 % (ref 35–45)
HGB BLD-MCNC: 12.1 G/DL (ref 12–16)
HGB UR QL STRIP: NEGATIVE
KETONES UR QL STRIP.AUTO: NEGATIVE MG/DL
LEUKOCYTE ESTERASE UR QL STRIP.AUTO: ABNORMAL
LYMPHOCYTES # BLD: 1.4 K/UL (ref 0.9–3.6)
LYMPHOCYTES NFR BLD: 21 % (ref 21–52)
MCH RBC QN AUTO: 28.9 PG (ref 24–34)
MCHC RBC AUTO-ENTMCNC: 33.9 G/DL (ref 31–37)
MCV RBC AUTO: 85.4 FL (ref 74–97)
MONOCYTES # BLD: 0.8 K/UL (ref 0.05–1.2)
MONOCYTES NFR BLD: 13 % (ref 3–10)
NEUTS SEG # BLD: 4.4 K/UL (ref 1.8–8)
NEUTS SEG NFR BLD: 65 % (ref 40–73)
NITRITE UR QL STRIP.AUTO: NEGATIVE
PH UR STRIP: 5.5 [PH] (ref 5–8)
PLATELET # BLD AUTO: 245 K/UL (ref 135–420)
PMV BLD AUTO: 10.8 FL (ref 9.2–11.8)
POTASSIUM SERPL-SCNC: 3.6 MMOL/L (ref 3.5–5.5)
PROT SERPL-MCNC: 9.2 G/DL (ref 6.4–8.2)
PROT UR STRIP-MCNC: NEGATIVE MG/DL
RBC # BLD AUTO: 4.18 M/UL (ref 4.2–5.3)
RBC #/AREA URNS HPF: ABNORMAL /HPF (ref 0–5)
SODIUM SERPL-SCNC: 140 MMOL/L (ref 136–145)
SP GR UR REFRACTOMETRY: 1.01 (ref 1–1.03)
UROBILINOGEN UR QL STRIP.AUTO: 0.2 EU/DL (ref 0.2–1)
WBC # BLD AUTO: 6.7 K/UL (ref 4.6–13.2)
WBC URNS QL MICRO: ABNORMAL /HPF (ref 0–4)

## 2019-09-07 PROCEDURE — 93005 ELECTROCARDIOGRAM TRACING: CPT

## 2019-09-07 PROCEDURE — 85025 COMPLETE CBC W/AUTO DIFF WBC: CPT

## 2019-09-07 PROCEDURE — 80053 COMPREHEN METABOLIC PANEL: CPT

## 2019-09-07 PROCEDURE — 81001 URINALYSIS AUTO W/SCOPE: CPT

## 2019-09-07 PROCEDURE — 94762 N-INVAS EAR/PLS OXIMTRY CONT: CPT

## 2019-09-07 PROCEDURE — 99285 EMERGENCY DEPT VISIT HI MDM: CPT

## 2019-09-07 NOTE — ED PROVIDER NOTES
EMERGENCY DEPARTMENT HISTORY AND PHYSICAL EXAM    4:50 PM      Date: 9/7/2019  Patient Name: Gian Marcus    History of Presenting Illness     Chief Complaint   Patient presents with    Syncope     pt states she feels weak         History Provided By: Patient    Chief Complaint: weakness  Duration:  Minutes  Timing:  Acute  Location: diffuse  Quality: NA  Severity: Mild  Modifying Factors: none  Associated Symptoms: shaky      Additional History (Context): Gian Marcus is a 78 y.o. female with diabetes and hypertension who presents with generalized weakness and shaky feeling. Patient states this started a few hours ago, and then she felt as if she might pass out while in the store. She admits to being seen in the ED for this similar symptoms about a week or so ago, was treated for UTI. She also is reporting a large amount of stress, stating there is things going on at home and she is all alone. She states that last week she saw her therapist and they called and are going to have APS follow-up to see the patient is eligible for any sort of home services. She denies any chest pain or shortness of breath. Denies any numbness. No recent falls or trauma. No other complaints. No alleviating or aggravating factors. PCP: Roney Paez NP    Current Outpatient Medications   Medication Sig Dispense Refill    diclofenac (VOLTAREN) 1 % gel Apply 4 g to affected area four (4) times daily. 100 g 0    hydroCHLOROthiazide (HYDRODIURIL) 25 mg tablet Take 25 mg by mouth daily.  amLODIPine (NORVASC) 10 mg tablet Take 10 mg by mouth daily.  fenofibrate nanocrystallized (TRICOR) 145 mg tablet Take  by mouth daily.  aspirin 81 mg chewable tablet Take 81 mg by mouth daily. Past History     Past Medical History:  Past Medical History:   Diagnosis Date    Depression     High cholesterol     Hypertension        Past Surgical History:  History reviewed. No pertinent surgical history.     Family History:  History reviewed. No pertinent family history. Social History:  Social History     Tobacco Use    Smoking status: Never Smoker    Smokeless tobacco: Never Used   Substance Use Topics    Alcohol use: Not Currently    Drug use: Never       Allergies: Allergies   Allergen Reactions    Aleve [Naproxen Sodium] Unable to Obtain    Bactrim [Sulfamethoprim] Diarrhea    Feldene [Piroxicam] Unknown (comments)     Shaking           Review of Systems       Review of Systems   Constitutional: Positive for fatigue. Negative for fever. Respiratory: Negative for shortness of breath. Cardiovascular: Negative for chest pain. Gastrointestinal: Negative for abdominal pain and vomiting. Neurological: Positive for light-headedness. All other systems reviewed and are negative. Physical Exam     Visit Vitals  Pulse 79   Temp 98.3 °F (36.8 °C)   Resp 28   Wt 74.8 kg (165 lb) Comment: pt is not sure   SpO2 99%   BMI 28.32 kg/m²         Physical Exam   Constitutional: She is oriented to person, place, and time. She appears well-developed and well-nourished. HENT:   Head: Normocephalic and atraumatic. Neck: Neck supple. No JVD present. Cardiovascular: Normal rate and regular rhythm. Pulmonary/Chest: Effort normal and breath sounds normal. No respiratory distress. Abdominal: Soft. She exhibits no distension. There is no tenderness. There is no rebound and no guarding. Musculoskeletal: She exhibits no edema. No joint tenderness   Neurological: She is alert and oriented to person, place, and time. No cranial nerve deficit. Strength 5 out of 5 x 4, no focal deficits   Skin: Skin is warm and dry. No erythema.    Psychiatric: Judgment normal.         Diagnostic Study Results     Labs -  Recent Results (from the past 12 hour(s))   URINALYSIS W/ RFLX MICROSCOPIC    Collection Time: 09/07/19  5:05 PM   Result Value Ref Range    Color YELLOW      Appearance CLEAR      Specific gravity 1.014 1.005 - 1.030      pH (UA) 5.5 5.0 - 8.0      Protein NEGATIVE  NEG mg/dL    Glucose NEGATIVE  NEG mg/dL    Ketone NEGATIVE  NEG mg/dL    Bilirubin NEGATIVE  NEG      Blood NEGATIVE  NEG      Urobilinogen 0.2 0.2 - 1.0 EU/dL    Nitrites NEGATIVE  NEG      Leukocyte Esterase SMALL (A) NEG     URINE MICROSCOPIC ONLY    Collection Time: 09/07/19  5:05 PM   Result Value Ref Range    WBC 4 to 10 0 - 4 /hpf    RBC 0 to 3 0 - 5 /hpf    Epithelial cells 2+ 0 - 5 /lpf    Bacteria 3+ (A) NEG /hpf   EKG, 12 LEAD, INITIAL    Collection Time: 09/07/19  5:17 PM   Result Value Ref Range    Ventricular Rate 80 BPM    Atrial Rate 80 BPM    P-R Interval 142 ms    QRS Duration 92 ms    Q-T Interval 344 ms    QTC Calculation (Bezet) 396 ms    Calculated P Axis 56 degrees    Calculated R Axis 28 degrees    Calculated T Axis 40 degrees    Diagnosis       Normal sinus rhythm  Nonspecific T wave abnormality  Abnormal ECG  When compared with ECG of 30-AUG-2019 14:43,  No significant change was found     CBC WITH AUTOMATED DIFF    Collection Time: 09/07/19  5:29 PM   Result Value Ref Range    WBC 6.7 4.6 - 13.2 K/uL    RBC 4.18 (L) 4.20 - 5.30 M/uL    HGB 12.1 12.0 - 16.0 g/dL    HCT 35.7 35.0 - 45.0 %    MCV 85.4 74.0 - 97.0 FL    MCH 28.9 24.0 - 34.0 PG    MCHC 33.9 31.0 - 37.0 g/dL    RDW 14.9 (H) 11.6 - 14.5 %    PLATELET 212 002 - 342 K/uL    MPV 10.8 9.2 - 11.8 FL    NEUTROPHILS 65 40 - 73 %    LYMPHOCYTES 21 21 - 52 %    MONOCYTES 13 (H) 3 - 10 %    EOSINOPHILS 1 0 - 5 %    BASOPHILS 0 0 - 2 %    ABS. NEUTROPHILS 4.4 1.8 - 8.0 K/UL    ABS. LYMPHOCYTES 1.4 0.9 - 3.6 K/UL    ABS. MONOCYTES 0.8 0.05 - 1.2 K/UL    ABS. EOSINOPHILS 0.0 0.0 - 0.4 K/UL    ABS.  BASOPHILS 0.0 0.0 - 0.1 K/UL    DF AUTOMATED     METABOLIC PANEL, COMPREHENSIVE    Collection Time: 09/07/19  5:29 PM   Result Value Ref Range    Sodium 140 136 - 145 mmol/L    Potassium 3.6 3.5 - 5.5 mmol/L    Chloride 102 100 - 111 mmol/L    CO2 30 21 - 32 mmol/L    Anion gap 8 3.0 - 18 mmol/L    Glucose 103 (H) 74 - 99 mg/dL    BUN 17 7.0 - 18 MG/DL    Creatinine 0.79 0.6 - 1.3 MG/DL    BUN/Creatinine ratio 22 (H) 12 - 20      GFR est AA >60 >60 ml/min/1.73m2    GFR est non-AA >60 >60 ml/min/1.73m2    Calcium 10.1 8.5 - 10.1 MG/DL    Bilirubin, total 0.3 0.2 - 1.0 MG/DL    ALT (SGPT) 21 13 - 56 U/L    AST (SGOT) 18 10 - 38 U/L    Alk. phosphatase 81 45 - 117 U/L    Protein, total 9.2 (H) 6.4 - 8.2 g/dL    Albumin 4.0 3.4 - 5.0 g/dL    Globulin 5.2 (H) 2.0 - 4.0 g/dL    A-G Ratio 0.8 0.8 - 1.7         Radiologic Studies -   No orders to display         Medical Decision Making   I am the first provider for this patient. I reviewed the vital signs, available nursing notes, past medical history, past surgical history, family history and social history. Vital Signs-Reviewed the patient's vital signs. Pulse Oximetry Analysis -  99 on room air (Interpretation)nl       Records Reviewed: Nursing Notes and Old Medical Records (Time of Review: 4:50 PM)    ED Course: Progress Notes, Reevaluation, and Consults:      Provider Notes (Medical Decision Making): 69-year-old female presenting with generalized fatigue. Similar symptoms one prior UTI, will check basic labs. No focal deficits, no indication for head imaging. 6:30 PM  This results with patient, stable for NH home. Had advised that she follow-up with her PCP and also with her therapist.  Elva Francis a significant amount of her symptoms are due to her stress and depression. Diagnosis     Clinical Impression:   1.  Fatigue, unspecified type        Disposition: discharged    Follow-up Information     Follow up With Specialties Details Why Contact Info    Estiven Camargo NP Nurse Practitioner Schedule an appointment as soon as possible for a visit in 3 days  [address] [facility] [address]  [phone]      [facility] EMERGENCY DEPT Emergency Medicine  As needed, If symptoms worsen [address] [place] [address] Patient's Medications   Start Taking    No medications on file   Continue Taking    AMLODIPINE (NORVASC) 10 MG TABLET    Take 10 mg by mouth daily. ASPIRIN 81 MG CHEWABLE TABLET    Take 81 mg by mouth daily. DICLOFENAC (VOLTAREN) 1 % GEL    Apply 4 g to affected area four (4) times daily. FENOFIBRATE NANOCRYSTALLIZED (TRICOR) 145 MG TABLET    Take  by mouth daily. HYDROCHLOROTHIAZIDE (HYDRODIURIL) 25 MG TABLET    Take 25 mg by mouth daily.    These Medications have changed    No medications on file   Stop Taking    No medications on file     _______________________________

## 2019-09-07 NOTE — DISCHARGE INSTRUCTIONS

## 2019-09-08 LAB
ATRIAL RATE: 80 BPM
CALCULATED P AXIS, ECG09: 56 DEGREES
CALCULATED R AXIS, ECG10: 28 DEGREES
CALCULATED T AXIS, ECG11: 40 DEGREES
DIAGNOSIS, 93000: NORMAL
P-R INTERVAL, ECG05: 142 MS
Q-T INTERVAL, ECG07: 344 MS
QRS DURATION, ECG06: 92 MS
QTC CALCULATION (BEZET), ECG08: 396 MS
VENTRICULAR RATE, ECG03: 80 BPM

## 2019-09-09 ENCOUNTER — DOCUMENTATION ONLY (OUTPATIENT)
Dept: ORTHOPEDIC SURGERY | Age: 69
End: 2019-09-09

## 2019-09-09 NOTE — PROGRESS NOTES
Minor Soulier calling with [facility] calling to advise that they are putting patient on Medical hold, patient had two bugs on patient. Patient has infestation of bed bugs. They have reached out to APS and providing resources that patient may need.      Call back number [phone]

## 2019-09-10 ENCOUNTER — APPOINTMENT (OUTPATIENT)
Dept: PHYSICAL THERAPY | Age: 69
End: 2019-09-10
Payer: MEDICAID

## 2019-09-12 ENCOUNTER — HOSPITAL ENCOUNTER (OUTPATIENT)
Dept: PHYSICAL THERAPY | Age: 69
End: 2019-09-12
Payer: MEDICAID

## 2019-09-17 ENCOUNTER — HOSPITAL ENCOUNTER (OUTPATIENT)
Dept: PHYSICAL THERAPY | Age: 69
End: 2019-09-17
Payer: MEDICAID

## 2019-09-19 ENCOUNTER — APPOINTMENT (OUTPATIENT)
Dept: PHYSICAL THERAPY | Age: 69
End: 2019-09-19
Payer: MEDICAID

## 2019-09-26 ENCOUNTER — APPOINTMENT (OUTPATIENT)
Dept: PHYSICAL THERAPY | Age: 69
End: 2019-09-26
Payer: MEDICAID

## 2019-10-01 ENCOUNTER — APPOINTMENT (OUTPATIENT)
Dept: PHYSICAL THERAPY | Age: 69
End: 2019-10-01
Payer: MEDICARE

## 2019-10-03 ENCOUNTER — TELEPHONE (OUTPATIENT)
Dept: PHYSICAL THERAPY | Age: 69
End: 2019-10-03

## 2019-10-03 ENCOUNTER — HOSPITAL ENCOUNTER (OUTPATIENT)
Dept: PHYSICAL THERAPY | Age: 69
End: 2019-10-03
Payer: MEDICARE

## 2019-10-07 ENCOUNTER — TELEPHONE (OUTPATIENT)
Dept: PHYSICAL THERAPY | Age: 69
End: 2019-10-07

## 2019-10-08 ENCOUNTER — APPOINTMENT (OUTPATIENT)
Dept: PHYSICAL THERAPY | Age: 69
End: 2019-10-08
Payer: MEDICARE

## 2019-10-08 ENCOUNTER — TELEPHONE (OUTPATIENT)
Dept: PHYSICAL THERAPY | Age: 69
End: 2019-10-08

## 2019-10-08 RX ORDER — DICLOFENAC SODIUM 10 MG/G
4 GEL TOPICAL 4 TIMES DAILY
Qty: 100 G | Refills: 0 | Status: SHIPPED | OUTPATIENT
Start: 2019-10-08 | End: 2022-04-11

## 2019-10-08 NOTE — TELEPHONE ENCOUNTER
Patient is requesting that Dr. Kelley Mckay refill the Voltaren gel for her. This was last prescribed by an ER physician. Patient states she needs this to hold her until she can do PT. Please sign if appropriate. Last Visit: 8/26/19 with MD Kelley Mckay  Next Appointment: f/u prn    Requested Prescriptions     Pending Prescriptions Disp Refills    diclofenac (VOLTAREN) 1 % gel 100 g 0     Sig: Apply 4 g to affected area four (4) times daily.

## 2019-10-10 ENCOUNTER — APPOINTMENT (OUTPATIENT)
Dept: PHYSICAL THERAPY | Age: 69
End: 2019-10-10
Payer: MEDICARE

## 2019-10-15 ENCOUNTER — APPOINTMENT (OUTPATIENT)
Dept: PHYSICAL THERAPY | Age: 69
End: 2019-10-15
Payer: MEDICARE

## 2019-10-17 ENCOUNTER — APPOINTMENT (OUTPATIENT)
Dept: PHYSICAL THERAPY | Age: 69
End: 2019-10-17
Payer: MEDICARE

## 2019-10-22 ENCOUNTER — HOSPITAL ENCOUNTER (OUTPATIENT)
Dept: PHYSICAL THERAPY | Age: 69
Discharge: HOME | End: 2019-10-22
Payer: MEDICARE

## 2019-10-22 PROCEDURE — 97164 PT RE-EVAL EST PLAN CARE: CPT

## 2019-10-22 PROCEDURE — 97110 THERAPEUTIC EXERCISES: CPT

## 2019-10-22 NOTE — PROGRESS NOTES
[facility]  [employer] [facility] [employer]  [address] [place] [address]  [phone] fax    Continued Plan of Care/ Re-certification for Physical Therapy Services    Patient name: Cheri Jay Start of Care: 2019   Referral source: Lucía Null MD : 1950               Medical Diagnosis: Unilateral primary osteoarthritis, left knee [M17.12]  Pain in left knee [M25.562]  Payor: [facility] MEDICAID / Plan: [facility] / Product Type: Managed Care Medicaid /  Onset Date:2019   Treatment Diagnosis: left knee pain   Prior Hospitalization: see medical history Provider#: [id number]   Medications: Verified on Patient summary List    Comorbidities: depression, OA, HTN   Prior Level of Function: I ambulation                             Visits from Start of Care: 1    Missed Visits: 0    The Plan of Care and following information is based on the patient's current status:  Goal:Pt will demonstrate an increased FOTO score to 54/100 in order to improve function. Status at last note/certification:38/100 initial score   Current Status: not met    Goal:Pt will be able to ambulate 250ft continuously without AD/LE buckling/LOB/rest break in order to return to community ambulation and her daily walking regimen  Status at last note/certification: everyday has buckling sensation   Current Status: not met    Goal:Pt will demonstrate a proper squat and lift of 10lbs without valgus collapse in order to lift groceries without pain  Status at last note/certification: valgus collpase  Current Status: not met    Goal:Pt will demonstrate increased left knee flexion and extension strength to > 4+/5 in order to assist with ambulation and transfers  Status at last note/certification:3+/5  Current Status: not met     Goal:Pt will be compliant with HEP for symptom management at home.   Status at last note/certification: reviewed   Current Status: not met        Key functional changes: TTP left knee patellar tendon, hamstring tendons       Problems/ barriers to goal attainment: poor compliance with HEP    Problem List: pain affecting function, decrease ROM, decrease strength, edema affecting function, impaired gait/ balance, decrease ADL/ functional abilitiies, decrease activity tolerance and decrease flexibility/ joint mobility    Treatment Plan: Therapeutic exercise, Therapeutic activities, Neuromuscular re-education, Physical agent/modality, Gait/balance training, Manual therapy and Patient education     Patient Goal (s) has been updated and includes: \" I hope to be able to walk without the cane    Goals for this certification period to be accomplished in 8 weeks:  1. Pt will demonstrate an increased FOTO score to TBA/100 in order to improve function. 2. Pt will be independent with HEP at D/C for self management. 3. Pt will be able to ambulate 250ft continuously without AD/LE buckling/LOB/rest break in order to return to community ambulation and her daily walking regimen  4. Pt will demonstrate increased left knee flexion and extension strength to > 4+/5 in order to assist with ambulation and transfers  5. Pt will demonstrate a proper squat and lift of 10lbs without valgus collapse in order to lift groceries without pain         Frequency / Duration: Patient to be seen 2 times per week for 8 weeks:    Assessment / Recommendations:Patient has not returned to therapy for several weeks due to taking care of household stress. She has not made any progress towards goals and reports limited compliance with HEP. Skilled PT is necessary to modify and progress therapeutic interventions, address ROM deficits, address strength deficits, analyze and address soft tissue restrictions and analyze and modify body mechanics/ergonomics in order to increase ease of ADLs and improve QOL.      Certification Period: 10/22/2019 to 12/21/2019    Bekah De Paz, PT 10/22/2019 2:07 PM    ________________________________________________________________________  I certify that the above Therapy Services are being furnished while the patient is under my care. I agree with the treatment plan and certify that this therapy is necessary. [] I have read the above and request that my patient continue as recommended.   [] I have read the above report and request that my patient continue therapy with the following changes/special instructions: _______________________________________  [] I have read the above report and request that my patient be discharged from therapy    Physician's Signature:____________Date:_________TIME:________    ** Signature, Date and Time must be completed for valid certification **    Please sign and return to [facility]  [facility]  [address] [place] [address]  [phone] fax

## 2019-10-22 NOTE — PROGRESS NOTES
PT DAILY TREATMENT NOTE 10-18    Patient Name: Rika Ferrer  Date:10/22/2019  : 1950  [x]  Patient  Verified  Payor: [facility] MEDICAID / [facility] / Product Type: Managed Care Medicaid /    In time:200  Out time:258  Total Treatment Time (min): 62  Visit #: 2 of 10    Medicare/BCBS Only   Total Timed Codes (min):  48 1:1 Treatment Time:  40       Treatment Area: Pain in left knee [M25.562]  Unilateral primary osteoarthritis, left knee [M17.12]    SUBJECTIVE  Pain Level (0-10 scale): 7/10  Any medication changes, allergies to medications, adverse drug reactions, diagnosis change, or new procedure performed?: [x] No    [] Yes (see summary sheet for update)  Subjective functional status/changes:   [] No changes reported  Patient reports she has not been able to do her home exercises. Has not attended therapy since evaluation due to taking care of household stressor. OBJECTIVE    Modality rationale: decrease inflammation and decrease pain to improve the patients ability to increase ease of ambulation.    Min Type Additional Details    [] Estim:  []Unatt       []IFC  []Premod                        []Other:  []w/ice   []w/heat  Position:  Location:    [] Estim: []Att    []TENS instruct  []NMES                    []Other:  []w/US   []w/ice   []w/heat  Position:  Location:    []  Traction: [] Cervical       []Lumbar                       [] Prone          []Supine                       []Intermittent   []Continuous Lbs:  [] before manual  [] after manual    []  Ultrasound: []Continuous   [] Pulsed                           []1MHz   []3MHz W/cm2:  Location:    []  Iontophoresis with dexamethasone         Location: [] Take home patch   [] In clinic   10 [x]  Ice     []  heat  []  Ice massage  []  Laser   []  Anodyne Position:supine with wedge Location: left knee    []  Laser with stim  []  Other:  Position:  Location:    []  Vasopneumatic Device Pressure:       [] lo [] med [] hi Temperature: [] lo [] med [] hi   [] Skin assessment post-treatment:  [x]intact []redness- no adverse reaction    []redness  adverse reaction:     15 min []Eval                  [x]Re-Eval       30 min Therapeutic Exercise:  [x] See flow sheet :   Rationale: increase ROM and increase strength to improve the patients ability to increase ease of ADL\"s. With   [] TE   [] TA   [] neuro   [] other: Patient Education: [x] Review HEP    [] Progressed/Changed HEP based on:   [] positioning   [] body mechanics   [] transfers   [] heat/ice application    [] other:      Other Objective/Functional Measures:   FOTO 54/100   No increased pain with there-ex  Challenged with bridge due to poor glute strength     Pain Level (0-10 scale) post treatment: 5/10    ASSESSMENT/Changes in Function: see POC. Patient will continue to benefit from skilled PT services to modify and progress therapeutic interventions, address ROM deficits, address strength deficits, analyze and address soft tissue restrictions and analyze and modify body mechanics/ergonomics to attain remaining goals. []  See Plan of Care  [x]  See progress note/recertification  []  See Discharge Summary         Progress towards goals / Updated goals: · Short Term Goals: To be accomplished in  2  treatments:  1. Pt will be compliant with HEP for symptom management at home.   not met  · Long Term Goals: To be accomplished in  10  treatments:  1. Pt will demonstrate an increased FOTO score to 54/100 in order to improve function. Not met  2. Pt will be independent with HEP at D/C for self management. 3. Pt will be able to ambulate 250ft continuously without AD/LE buckling/LOB/rest break in order to return to community ambulation and her daily walking regimen  Not met  4. Pt will demonstrate increased left knee flexion and extension strength to > 4+/5 in order to assist with ambulation and transfers  Not met  5.  Pt will demonstrate a proper squat and lift of 10lbs without valgus collapse in order to lift groceries without pain  Not met    PLAN  []  Upgrade activities as tolerated     [x]  Continue plan of care  []  Update interventions per flow sheet       []  Discharge due to:_  []  Other:_      Delfina Guevara, PT 10/22/2019  1:58 PM    Future Appointments   Date Time Provider Bharat Thakkar   10/22/2019  2:00 PM Sarah Marquez, PT HKGCWNP [facility]   10/24/2019  2:00 PM Maksim Melgar, PTA [id number] [facility]

## 2019-10-23 ENCOUNTER — APPOINTMENT (OUTPATIENT)
Dept: GENERAL RADIOLOGY | Age: 69
End: 2019-10-23
Attending: PHYSICIAN ASSISTANT
Payer: MEDICARE

## 2019-10-23 ENCOUNTER — HOSPITAL ENCOUNTER (EMERGENCY)
Age: 69
Discharge: HOME | End: 2019-10-23
Attending: EMERGENCY MEDICINE
Payer: MEDICARE

## 2019-10-23 VITALS
HEART RATE: 73 BPM | OXYGEN SATURATION: 100 % | SYSTOLIC BLOOD PRESSURE: 129 MMHG | RESPIRATION RATE: 24 BRPM | DIASTOLIC BLOOD PRESSURE: 65 MMHG | TEMPERATURE: 98.8 F

## 2019-10-23 DIAGNOSIS — R53.1 WEAKNESS: ICD-10-CM

## 2019-10-23 DIAGNOSIS — R51.9 NONINTRACTABLE EPISODIC HEADACHE, UNSPECIFIED HEADACHE TYPE: Primary | ICD-10-CM

## 2019-10-23 DIAGNOSIS — E87.6 HYPOKALEMIA: ICD-10-CM

## 2019-10-23 LAB
ALBUMIN SERPL-MCNC: 3.8 G/DL (ref 3.4–5)
ALBUMIN/GLOB SERPL: 0.7 {RATIO} (ref 0.8–1.7)
ALP SERPL-CCNC: 96 U/L (ref 45–117)
ALT SERPL-CCNC: 26 U/L (ref 13–56)
ANION GAP SERPL CALC-SCNC: 5 MMOL/L (ref 3–18)
APPEARANCE UR: CLEAR
AST SERPL-CCNC: 20 U/L (ref 10–38)
BACTERIA URNS QL MICRO: ABNORMAL /HPF
BASOPHILS # BLD: 0 K/UL (ref 0–0.1)
BASOPHILS NFR BLD: 0 % (ref 0–2)
BILIRUB SERPL-MCNC: 0.3 MG/DL (ref 0.2–1)
BILIRUB UR QL: NEGATIVE
BUN SERPL-MCNC: 19 MG/DL (ref 7–18)
BUN/CREAT SERPL: 28 (ref 12–20)
CALCIUM SERPL-MCNC: 10.1 MG/DL (ref 8.5–10.1)
CHLORIDE SERPL-SCNC: 100 MMOL/L (ref 100–111)
CO2 SERPL-SCNC: 30 MMOL/L (ref 21–32)
COLOR UR: YELLOW
CREAT SERPL-MCNC: 0.69 MG/DL (ref 0.6–1.3)
DIFFERENTIAL METHOD BLD: ABNORMAL
EOSINOPHIL # BLD: 0.1 K/UL (ref 0–0.4)
EOSINOPHIL NFR BLD: 1 % (ref 0–5)
EPITH CASTS URNS QL MICRO: ABNORMAL /LPF (ref 0–5)
ERYTHROCYTE [DISTWIDTH] IN BLOOD BY AUTOMATED COUNT: 14.7 % (ref 11.6–14.5)
GLOBULIN SER CALC-MCNC: 5.3 G/DL (ref 2–4)
GLUCOSE SERPL-MCNC: 101 MG/DL (ref 74–99)
GLUCOSE UR STRIP.AUTO-MCNC: NEGATIVE MG/DL
HCT VFR BLD AUTO: 36.2 % (ref 35–45)
HGB BLD-MCNC: 12.5 G/DL (ref 12–16)
HGB UR QL STRIP: NEGATIVE
KETONES UR QL STRIP.AUTO: NEGATIVE MG/DL
LEUKOCYTE ESTERASE UR QL STRIP.AUTO: ABNORMAL
LYMPHOCYTES # BLD: 2.2 K/UL (ref 0.9–3.6)
LYMPHOCYTES NFR BLD: 30 % (ref 21–52)
MAGNESIUM SERPL-MCNC: 1.9 MG/DL (ref 1.6–2.6)
MCH RBC QN AUTO: 29.2 PG (ref 24–34)
MCHC RBC AUTO-ENTMCNC: 34.5 G/DL (ref 31–37)
MCV RBC AUTO: 84.6 FL (ref 74–97)
MONOCYTES # BLD: 0.7 K/UL (ref 0.05–1.2)
MONOCYTES NFR BLD: 10 % (ref 3–10)
MUCOUS THREADS URNS QL MICRO: ABNORMAL /LPF
NEUTS SEG # BLD: 4.3 K/UL (ref 1.8–8)
NEUTS SEG NFR BLD: 59 % (ref 40–73)
NITRITE UR QL STRIP.AUTO: NEGATIVE
PH UR STRIP: 5 [PH] (ref 5–8)
PLATELET # BLD AUTO: 242 K/UL (ref 135–420)
PMV BLD AUTO: 11.2 FL (ref 9.2–11.8)
POTASSIUM SERPL-SCNC: 2.9 MMOL/L (ref 3.5–5.5)
PROT SERPL-MCNC: 9.1 G/DL (ref 6.4–8.2)
PROT UR STRIP-MCNC: NEGATIVE MG/DL
RBC # BLD AUTO: 4.28 M/UL (ref 4.2–5.3)
RBC #/AREA URNS HPF: ABNORMAL /HPF (ref 0–5)
SODIUM SERPL-SCNC: 135 MMOL/L (ref 136–145)
SP GR UR REFRACTOMETRY: 1.02 (ref 1–1.03)
TROPONIN I SERPL-MCNC: <0.02 NG/ML (ref 0–0.04)
TSH SERPL DL<=0.05 MIU/L-ACNC: 1.76 UIU/ML (ref 0.36–3.74)
UROBILINOGEN UR QL STRIP.AUTO: 0.2 EU/DL (ref 0.2–1)
WBC # BLD AUTO: 7.3 K/UL (ref 4.6–13.2)
WBC URNS QL MICRO: ABNORMAL /HPF (ref 0–4)

## 2019-10-23 PROCEDURE — 83735 ASSAY OF MAGNESIUM: CPT

## 2019-10-23 PROCEDURE — 74011250636 HC RX REV CODE- 250/636: Performed by: PHYSICIAN ASSISTANT

## 2019-10-23 PROCEDURE — 80053 COMPREHEN METABOLIC PANEL: CPT

## 2019-10-23 PROCEDURE — 84484 ASSAY OF TROPONIN QUANT: CPT

## 2019-10-23 PROCEDURE — 96365 THER/PROPH/DIAG IV INF INIT: CPT

## 2019-10-23 PROCEDURE — 96375 TX/PRO/DX INJ NEW DRUG ADDON: CPT

## 2019-10-23 PROCEDURE — 74011250636 HC RX REV CODE- 250/636: Performed by: NURSE PRACTITIONER

## 2019-10-23 PROCEDURE — 74011250637 HC RX REV CODE- 250/637: Performed by: PHYSICIAN ASSISTANT

## 2019-10-23 PROCEDURE — 93005 ELECTROCARDIOGRAM TRACING: CPT

## 2019-10-23 PROCEDURE — 96366 THER/PROPH/DIAG IV INF ADDON: CPT

## 2019-10-23 PROCEDURE — 81001 URINALYSIS AUTO W/SCOPE: CPT

## 2019-10-23 PROCEDURE — 99285 EMERGENCY DEPT VISIT HI MDM: CPT

## 2019-10-23 PROCEDURE — 71046 X-RAY EXAM CHEST 2 VIEWS: CPT

## 2019-10-23 PROCEDURE — 84443 ASSAY THYROID STIM HORMONE: CPT

## 2019-10-23 PROCEDURE — 85025 COMPLETE CBC W/AUTO DIFF WBC: CPT

## 2019-10-23 RX ORDER — ACETAMINOPHEN 325 MG/1
650 TABLET ORAL
Status: COMPLETED | OUTPATIENT
Start: 2019-10-23 | End: 2019-10-23

## 2019-10-23 RX ORDER — DIPHENHYDRAMINE HYDROCHLORIDE 50 MG/ML
50 INJECTION, SOLUTION INTRAMUSCULAR; INTRAVENOUS ONCE
Status: COMPLETED | OUTPATIENT
Start: 2019-10-23 | End: 2019-10-23

## 2019-10-23 RX ORDER — POTASSIUM CHLORIDE 20 MEQ/1
40 TABLET, EXTENDED RELEASE ORAL
Status: COMPLETED | OUTPATIENT
Start: 2019-10-23 | End: 2019-10-23

## 2019-10-23 RX ORDER — ONDANSETRON 4 MG/1
4 TABLET, ORALLY DISINTEGRATING ORAL
Status: COMPLETED | OUTPATIENT
Start: 2019-10-23 | End: 2019-10-23

## 2019-10-23 RX ORDER — POTASSIUM CHLORIDE 7.45 MG/ML
10 INJECTION INTRAVENOUS
Status: COMPLETED | OUTPATIENT
Start: 2019-10-23 | End: 2019-10-23

## 2019-10-23 RX ORDER — POTASSIUM CHLORIDE 20 MEQ/1
20 TABLET, EXTENDED RELEASE ORAL 3 TIMES DAILY
Qty: 9 TAB | Refills: 0 | Status: SHIPPED | OUTPATIENT
Start: 2019-10-23 | End: 2019-10-26

## 2019-10-23 RX ORDER — SERTRALINE HYDROCHLORIDE 100 MG/1
100 TABLET, FILM COATED ORAL DAILY
COMMUNITY
End: 2022-04-11

## 2019-10-23 RX ORDER — BUTALBITAL, ACETAMINOPHEN AND CAFFEINE 300; 40; 50 MG/1; MG/1; MG/1
1 CAPSULE ORAL
Qty: 15 CAP | Refills: 0 | Status: SHIPPED | OUTPATIENT
Start: 2019-10-23 | End: 2020-01-17

## 2019-10-23 RX ORDER — POTASSIUM CHLORIDE 1500 MG/1
20 TABLET, FILM COATED, EXTENDED RELEASE ORAL 2 TIMES DAILY
Qty: 14 TAB | Refills: 0 | Status: SHIPPED | OUTPATIENT
Start: 2019-10-23 | End: 2019-10-30

## 2019-10-23 RX ADMIN — POTASSIUM CHLORIDE 40 MEQ: 20 TABLET, EXTENDED RELEASE ORAL at 19:11

## 2019-10-23 RX ADMIN — DIPHENHYDRAMINE HYDROCHLORIDE 50 MG: 50 INJECTION INTRAMUSCULAR; INTRAVENOUS at 20:25

## 2019-10-23 RX ADMIN — POTASSIUM CHLORIDE 10 MEQ: 7.46 INJECTION, SOLUTION INTRAVENOUS at 18:41

## 2019-10-23 RX ADMIN — ACETAMINOPHEN 650 MG: 325 TABLET ORAL at 18:27

## 2019-10-23 RX ADMIN — ONDANSETRON 4 MG: 4 TABLET, ORALLY DISINTEGRATING ORAL at 19:11

## 2019-10-23 NOTE — DISCHARGE INSTRUCTIONS
Headache: Care Instructions  Your Care Instructions    Headaches have many possible causes. Most headaches aren't a sign of a more serious problem, and they will get better on their own. Home treatment may help you feel better faster. The doctor has checked you carefully, but problems can develop later. If you notice any problems or new symptoms, get medical treatment right away. Follow-up care is a key part of your treatment and safety. Be sure to make and go to all appointments, and call your doctor if you are having problems. It's also a good idea to know your test results and keep a list of the medicines you take. How can you care for yourself at home? · Do not drive if you have taken a prescription pain medicine. · Rest in a quiet, dark room until your headache is gone. Close your eyes and try to relax or go to sleep. Don't watch TV or read. · Put a cold, moist cloth or cold pack on the painful area for 10 to 20 minutes at a time. Put a thin cloth between the cold pack and your skin. · Use a warm, moist towel or a heating pad set on low to relax tight shoulder and neck muscles. · Have someone gently massage your neck and shoulders. · Take pain medicines exactly as directed. ? If the doctor gave you a prescription medicine for pain, take it as prescribed. ? If you are not taking a prescription pain medicine, ask your doctor if you can take an over-the-counter medicine. · Be careful not to take pain medicine more often than the instructions allow, because you may get worse or more frequent headaches when the medicine wears off. · Do not ignore new symptoms that occur with a headache, such as a fever, weakness or numbness, vision changes, or confusion. These may be signs of a more serious problem. To prevent headaches  · Keep a headache diary so you can figure out what triggers your headaches. Avoiding triggers may help you prevent headaches.  Record when each headache began, how long it lasted, and what the pain was like (throbbing, aching, stabbing, or dull). Write down any other symptoms you had with the headache, such as nausea, flashing lights or dark spots, or sensitivity to bright light or loud noise. Note if the headache occurred near your period. List anything that might have triggered the headache, such as certain foods (chocolate, cheese, wine) or odors, smoke, bright light, stress, or lack of sleep. · Find healthy ways to deal with stress. Headaches are most common during or right after stressful times. Take time to relax before and after you do something that has caused a headache in the past.  · Try to keep your muscles relaxed by keeping good posture. Check your jaw, face, neck, and shoulder muscles for tension, and try relaxing them. When sitting at a desk, change positions often, and stretch for 30 seconds each hour. · Get plenty of sleep and exercise. · Eat regularly and well. Long periods without food can trigger a headache. · Treat yourself to a massage. Some people find that regular massages are very helpful in relieving tension. · Limit caffeine by not drinking too much coffee, tea, or soda. But don't quit caffeine suddenly, because that can also give you headaches. · Reduce eyestrain from computers by blinking frequently and looking away from the computer screen every so often. Make sure you have proper eyewear and that your monitor is set up properly, about an arm's length away. · Seek help if you have depression or anxiety. Your headaches may be linked to these conditions. Treatment can both prevent headaches and help with symptoms of anxiety or depression. When should you call for help? Call 911 anytime you think you may need emergency care. For example, call if:    · You have signs of a stroke. These may include:  ? Sudden numbness, paralysis, or weakness in your face, arm, or leg, especially on only one side of your body. ? Sudden vision changes.   ? Sudden trouble speaking. ? Sudden confusion or trouble understanding simple statements. ? Sudden problems with walking or balance. ? A sudden, severe headache that is different from past headaches.    Call your doctor now or seek immediate medical care if:    · You have a new or worse headache.     · Your headache gets much worse. Where can you learn more? Go to http://bri-khari.info/. Enter M271 in the search box to learn more about \"Headache: Care Instructions. \"  Current as of: March 28, 2019  Content Version: 12.2  © 9461-3797 [employer]. Care instructions adapted under license by [employer] (which disclaims liability or warranty for this information). If you have questions about a medical condition or this instruction, always ask your healthcare professional. [address] any warranty or liability for your use of this information. Hypokalemia: Care Instructions  Your Care Instructions    Hypokalemia (say \"pr-we-xgm-BRANDO-jennifer-uh\") is a low level of potassium. The heart, muscles, kidneys, and nervous system all need potassium to work well. This problem has many different causes. Kidney problems, diet, and medicines like diuretics and laxatives can cause it. So can vomiting or diarrhea. In some cases, cancer is the cause. Your doctor may do tests to find the cause of your low potassium levels. You may need medicines to bring your potassium levels back to normal. You may also need regular blood tests to check your potassium. If you have very low potassium, you may need intravenous (IV) medicines. You also may need tests to check the electrical activity of your heart. Heart problems caused by low potassium levels can be very serious. Follow-up care is a key part of your treatment and safety. Be sure to make and go to all appointments, and call your doctor if you are having problems.  It's also a good idea to know your test results and keep a list of the medicines you take. How can you care for yourself at home? · If your doctor recommends it, eat foods that have a lot of potassium. These include fresh fruits, juices, and vegetables. They also include nuts, beans, and milk. · Be safe with medicines. If your doctor prescribes medicines or potassium supplements, take them exactly as directed. Call your doctor if you have any problems with your medicines. · Get your potassium levels tested as often as your doctor tells you. When should you call for help? Call 911 anytime you think you may need emergency care. For example, call if:    · You feel like your heart is missing beats. Heart problems caused by low potassium can cause death.     · You passed out (lost consciousness).     · You have a seizure.    Call your doctor now or seek immediate medical care if:    · You feel weak or unusually tired.     · You have severe arm or leg cramps.     · You have tingling or numbness.     · You feel sick to your stomach, or you vomit.     · You have belly cramps.     · You feel bloated or constipated.     · You have to urinate a lot.     · You feel very thirsty most of the time.     · You are dizzy or lightheaded, or you feel like you may faint.     · You feel depressed, or you lose touch with reality.    Watch closely for changes in your health, and be sure to contact your doctor if:    · You do not get better as expected. Where can you learn more? Go to http://bri-khari.info/. Enter G358 in the search box to learn more about \"Hypokalemia: Care Instructions. \"  Current as of: November 6, 2018  Content Version: 12.2  © 6429-5710 [employer]. Care instructions adapted under license by [employer] (which disclaims liability or warranty for this information).  If you have questions about a medical condition or this instruction, always ask your healthcare professional. [employer] disclaims any warranty or liability for your use of this information. Weakness: Care Instructions  Your Care Instructions    Weakness is a lack of physical or muscle strength. You may feel that you need to make extra effort to move your arms, legs, or other muscles. Generalized weakness means that you feel weak in most areas of your body. Another type of weakness may affect just one muscle or group of muscles. You may feel weak and tired after you have done too much activity, such as taking an extra-long hike. This is not a serious problem. It often goes away on its own. Feeling weak can also be caused by medical conditions like thyroid problems, depression, or a virus. Sometimes the cause can be serious. Your doctor may want to do more tests to try to find the cause of the weakness. The doctor has checked you carefully, but problems can develop later. If you notice any problems or new symptoms, get medical treatment right away. Follow-up care is a key part of your treatment and safety. Be sure to make and go to all appointments, and call your doctor if you are having problems. It's also a good idea to know your test results and keep a list of the medicines you take. How can you care for yourself at home? · Rest when you feel tired. · Be safe with medicines. If your doctor prescribed medicine, take it exactly as prescribed. Call your doctor if you think you are having a problem with your medicine. You will get more details on the specific medicines your doctor prescribes. · Do not skip meals. Eating a balanced diet may increase your energy level. · Get some physical activity every day, but do not get too tired. When should you call for help? Call your doctor now or seek immediate medical care if:    · You have new or worse weakness.     · You are dizzy or lightheaded, or you feel like you may faint.    Watch closely for changes in your health, and be sure to contact your doctor if:    · You do not get better as expected. Where can you learn more? Go to http://bri-khari.info/. Enter 270 1935 5154 in the search box to learn more about \"Weakness: Care Instructions. \"  Current as of: June 26, 2019  Content Version: 12.2  © 2250-5324 [employer]. Care instructions adapted under license by [employer] (which disclaims liability or warranty for this information). If you have questions about a medical condition or this instruction, always ask your healthcare professional. [address] any warranty or liability for your use of this information. Patient Education   Take medication as prescribed. Follow-up with your primary care physician within 2 days for reassessment. Bring the results from this visit with you for their review. Return to the ED immediately for any new, worsening, or persistent symptoms, including chest pain, shortness of breath, vomiting, or any other medical concerns. Patient Education        Hypokalemia: Care Instructions  Your Care Instructions    Hypokalemia (say \"jg-zm-etx-BRANDO-jennifer-uh\") is a low level of potassium. The heart, muscles, kidneys, and nervous system all need potassium to work well. This problem has many different causes. Kidney problems, diet, and medicines like diuretics and laxatives can cause it. So can vomiting or diarrhea. In some cases, cancer is the cause. Your doctor may do tests to find the cause of your low potassium levels. You may need medicines to bring your potassium levels back to normal. You may also need regular blood tests to check your potassium. If you have very low potassium, you may need intravenous (IV) medicines. You also may need tests to check the electrical activity of your heart. Heart problems caused by low potassium levels can be very serious. Follow-up care is a key part of your treatment and safety. Be sure to make and go to all appointments, and call your doctor if you are having problems.  It's also a good idea to know your test results and keep a list of the medicines you take. How can you care for yourself at home? · If your doctor recommends it, eat foods that have a lot of potassium. These include fresh fruits, juices, and vegetables. They also include nuts, beans, and milk. · Be safe with medicines. If your doctor prescribes medicines or potassium supplements, take them exactly as directed. Call your doctor if you have any problems with your medicines. · Get your potassium levels tested as often as your doctor tells you. When should you call for help? Call 911 anytime you think you may need emergency care. For example, call if:    · You feel like your heart is missing beats. Heart problems caused by low potassium can cause death.     · You passed out (lost consciousness).     · You have a seizure.    Call your doctor now or seek immediate medical care if:    · You feel weak or unusually tired.     · You have severe arm or leg cramps.     · You have tingling or numbness.     · You feel sick to your stomach, or you vomit.     · You have belly cramps.     · You feel bloated or constipated.     · You have to urinate a lot.     · You feel very thirsty most of the time.     · You are dizzy or lightheaded, or you feel like you may faint.     · You feel depressed, or you lose touch with reality.    Watch closely for changes in your health, and be sure to contact your doctor if:    · You do not get better as expected. Where can you learn more? Go to http://bri-khari.info/. Enter G358 in the search box to learn more about \"Hypokalemia: Care Instructions. \"  Current as of: November 6, 2018  Content Version: 12.2  © 0463-7484 [employer]. Care instructions adapted under license by [employer] (which disclaims liability or warranty for this information).  If you have questions about a medical condition or this instruction, always ask your healthcare professional. [employer] disclaims any warranty or liability for your use of this information. Fatigue: Care Instructions  Your Care Instructions    Fatigue is a feeling of tiredness, exhaustion, or lack of energy. You may feel fatigue because of too much or not enough activity. It can also come from stress, lack of sleep, boredom, and poor diet. Many medical problems, such as viral infections, can cause fatigue. Emotional problems, especially depression, are often the cause of fatigue. Fatigue is most often a symptom of another problem. Treatment for fatigue depends on the cause. For example, if you have fatigue because you have a certain health problem, treating this problem also treats your fatigue. If depression or anxiety is the cause, treatment may help. Follow-up care is a key part of your treatment and safety. Be sure to make and go to all appointments, and call your doctor if you are having problems. It's also a good idea to know your test results and keep a list of the medicines you take. How can you care for yourself at home? · Get regular exercise. But don't overdo it. Go back and forth between rest and exercise. · Get plenty of rest.  · Eat a healthy diet. Do not skip meals, especially breakfast.  · Reduce your use of caffeine, tobacco, and alcohol. Caffeine is most often found in coffee, tea, cola drinks, and chocolate. · Limit medicines that can cause fatigue. This includes tranquilizers and cold and allergy medicines. When should you call for help? Watch closely for changes in your health, and be sure to contact your doctor if:    · You have new symptoms such as fever or a rash.     · Your fatigue gets worse.     · You have been feeling down, depressed, or hopeless. Or you may have lost interest in things that you usually enjoy.     · You are not getting better as expected. Where can you learn more? Go to http://bri-khari.info/.   Enter L294 in the search box to learn more about \"Fatigue: Care Instructions. \"  Current as of: June 26, 2019  Content Version: 12.2  © 4186-9687 [employer]. Care instructions adapted under license by [employer] (which disclaims liability or warranty for this information). If you have questions about a medical condition or this instruction, always ask your healthcare professional. [address] any warranty or liability for your use of this information. Patient Education        Headache: Care Instructions  Your Care Instructions    Headaches have many possible causes. Most headaches aren't a sign of a more serious problem, and they will get better on their own. Home treatment may help you feel better faster. The doctor has checked you carefully, but problems can develop later. If you notice any problems or new symptoms, get medical treatment right away. Follow-up care is a key part of your treatment and safety. Be sure to make and go to all appointments, and call your doctor if you are having problems. It's also a good idea to know your test results and keep a list of the medicines you take. How can you care for yourself at home? · Do not drive if you have taken a prescription pain medicine. · Rest in a quiet, dark room until your headache is gone. Close your eyes and try to relax or go to sleep. Don't watch TV or read. · Put a cold, moist cloth or cold pack on the painful area for 10 to 20 minutes at a time. Put a thin cloth between the cold pack and your skin. · Use a warm, moist towel or a heating pad set on low to relax tight shoulder and neck muscles. · Have someone gently massage your neck and shoulders. · Take pain medicines exactly as directed. ? If the doctor gave you a prescription medicine for pain, take it as prescribed. ? If you are not taking a prescription pain medicine, ask your doctor if you can take an over-the-counter medicine.   · Be careful not to take pain medicine more often than the instructions allow, because you may get worse or more frequent headaches when the medicine wears off. · Do not ignore new symptoms that occur with a headache, such as a fever, weakness or numbness, vision changes, or confusion. These may be signs of a more serious problem. To prevent headaches  · Keep a headache diary so you can figure out what triggers your headaches. Avoiding triggers may help you prevent headaches. Record when each headache began, how long it lasted, and what the pain was like (throbbing, aching, stabbing, or dull). Write down any other symptoms you had with the headache, such as nausea, flashing lights or dark spots, or sensitivity to bright light or loud noise. Note if the headache occurred near your period. List anything that might have triggered the headache, such as certain foods (chocolate, cheese, wine) or odors, smoke, bright light, stress, or lack of sleep. · Find healthy ways to deal with stress. Headaches are most common during or right after stressful times. Take time to relax before and after you do something that has caused a headache in the past.  · Try to keep your muscles relaxed by keeping good posture. Check your jaw, face, neck, and shoulder muscles for tension, and try relaxing them. When sitting at a desk, change positions often, and stretch for 30 seconds each hour. · Get plenty of sleep and exercise. · Eat regularly and well. Long periods without food can trigger a headache. · Treat yourself to a massage. Some people find that regular massages are very helpful in relieving tension. · Limit caffeine by not drinking too much coffee, tea, or soda. But don't quit caffeine suddenly, because that can also give you headaches. · Reduce eyestrain from computers by blinking frequently and looking away from the computer screen every so often.  Make sure you have proper eyewear and that your monitor is set up properly, about an arm's length away.  · Seek help if you have depression or anxiety. Your headaches may be linked to these conditions. Treatment can both prevent headaches and help with symptoms of anxiety or depression. When should you call for help? Call 911 anytime you think you may need emergency care. For example, call if:    · You have signs of a stroke. These may include:  ? Sudden numbness, paralysis, or weakness in your face, arm, or leg, especially on only one side of your body. ? Sudden vision changes. ? Sudden trouble speaking. ? Sudden confusion or trouble understanding simple statements. ? Sudden problems with walking or balance. ? A sudden, severe headache that is different from past headaches.    Call your doctor now or seek immediate medical care if:    · You have a new or worse headache.     · Your headache gets much worse. Where can you learn more? Go to http://bri-khari.info/. Enter M271 in the search box to learn more about \"Headache: Care Instructions. \"  Current as of: March 28, 2019  Content Version: 12.2  © 7130-5957 [employer]. Care instructions adapted under license by [employer] (which disclaims liability or warranty for this information). If you have questions about a medical condition or this instruction, always ask your healthcare professional. [place] [address] any warranty or liability for your use of this information.

## 2019-10-23 NOTE — ED NOTES
Patient presents to ED with C/O a H/A, weakness from a decreased appetite, stress, and nausea that started a few days ago. The pt stated she has had a decrease in appetite and been under a lot stress for 2 months. The pt denies tingling, numbness, diarrhea, and vomiting. Patient is A&Ox4, side rails upx1, call bell w/in reach, and aware of plan of care. The patient is in NAD.

## 2019-10-23 NOTE — ED PROVIDER NOTES
EMERGENCY DEPARTMENT HISTORY AND PHYSICAL EXAM    5:24 PM      Date: 10/23/2019  Patient Name: Sergio Barba    History of Presenting Illness     Chief Complaint   Patient presents with    Headache         History Provided By: Patient    Additional History (Context): Sergio Barba is a 78 y.o. female with hypertension and hyperlipidemia who presents with complaint of frontal headache associated with generalized weakness, decreased appetite, increased stress, and nausea for 3 days. Patient denies fever or chills, dizziness, changes in vision, chest pain, shortness of breath, extremity weakness, numbness or tingling, abdominal pain, vomiting, diarrhea, dysuria, hematuria. She has taken aspirin and Tylenol for symptoms at home. Notes headache is mild, not worst headache of life. Notes she saw her PMD yesterday \"and she fixed some of my depression medication\". PCP: Caitlyn Neil NP    Current Facility-Administered Medications   Medication Dose Route Frequency Provider Last Rate Last Dose    ondansetron (ZOFRAN ODT) tablet 4 mg  4 mg Oral NOW Paola Fowler PA        potassium chloride (K-DUR, KLOR-CON) SR tablet 40 mEq  40 mEq Oral NOW Anurag Fowler, [address]        potassium chloride 10 mEq in 100 ml IVPB  10 mEq IntraVENous NOW Anurag Fowler, PA         Current Outpatient Medications   Medication Sig Dispense Refill    sertraline (ZOLOFT) 100 mg tablet Take 100 mg by mouth daily.  potassium chloride (K-DUR, KLOR-CON) 20 mEq tablet Take 1 Tab by mouth three (3) times daily for 3 days. 9 Tab 0    diclofenac (VOLTAREN) 1 % gel Apply 4 g to affected area four (4) times daily. 100 g 0    hydroCHLOROthiazide (HYDRODIURIL) 25 mg tablet Take 25 mg by mouth daily.  amLODIPine (NORVASC) 10 mg tablet Take 10 mg by mouth daily.  aspirin 81 mg chewable tablet Take 81 mg by mouth daily.          Past History     Past Medical History:  Past Medical History:   Diagnosis Date    Depression  High cholesterol     Hypertension        Past Surgical History:  History reviewed. No pertinent surgical history. Family History:  History reviewed. No pertinent family history. Social History:  Social History     Tobacco Use    Smoking status: Never Smoker    Smokeless tobacco: Never Used   Substance Use Topics    Alcohol use: Not Currently    Drug use: Never       Allergies: Allergies   Allergen Reactions    Aleve [Naproxen Sodium] Unable to Obtain    Bactrim [Sulfamethoprim] Diarrhea    Feldene [Piroxicam] Unknown (comments)     Shaking      Penicillins Rash         Review of Systems       Review of Systems   Constitutional: Negative for chills and fever. Respiratory: Negative for shortness of breath. Cardiovascular: Negative for chest pain. Gastrointestinal: Positive for nausea. Negative for abdominal pain and vomiting. Skin: Negative for rash. Neurological: Positive for weakness and headaches. All other systems reviewed and are negative. Physical Exam     Visit Vitals  /83   Pulse 93   Temp 98.8 °F (37.1 °C)   Resp 16   SpO2 97%         Physical Exam   Constitutional: She is oriented to person, place, and time. She appears well-developed and well-nourished. No distress. HENT:   Head: Normocephalic and atraumatic. Mouth/Throat: Oropharynx is clear and moist.   Eyes: Pupils are equal, round, and reactive to light. Neck: Normal range of motion. Neck supple. No nuchal rigidity    Cardiovascular: Normal rate, regular rhythm, normal heart sounds and intact distal pulses. Exam reveals no gallop and no friction rub. No murmur heard. Pulmonary/Chest: Effort normal and breath sounds normal. No stridor. No respiratory distress. She has no wheezes. She has no rales. Abdominal: Soft. She exhibits no distension. There is no tenderness. There is no rebound and no guarding. Musculoskeletal: Normal range of motion.    Lymphadenopathy:     She has no cervical adenopathy. Neurological: She is alert and oriented to person, place, and time. She has normal strength. She is not disoriented. No cranial nerve deficit or sensory deficit. Coordination and gait normal.   Skin: Skin is warm. No rash noted. She is not diaphoretic. Nursing note and vitals reviewed. Diagnostic Study Results     Labs -  Recent Results (from the past 12 hour(s))   CBC WITH AUTOMATED DIFF    Collection Time: 10/23/19  5:50 PM   Result Value Ref Range    WBC 7.3 4.6 - 13.2 K/uL    RBC 4.28 4.20 - 5.30 M/uL    HGB 12.5 12.0 - 16.0 g/dL    HCT 36.2 35.0 - 45.0 %    MCV 84.6 74.0 - 97.0 FL    MCH 29.2 24.0 - 34.0 PG    MCHC 34.5 31.0 - 37.0 g/dL    RDW 14.7 (H) 11.6 - 14.5 %    PLATELET 523 801 - 611 K/uL    MPV 11.2 9.2 - 11.8 FL    NEUTROPHILS 59 40 - 73 %    LYMPHOCYTES 30 21 - 52 %    MONOCYTES 10 3 - 10 %    EOSINOPHILS 1 0 - 5 %    BASOPHILS 0 0 - 2 %    ABS. NEUTROPHILS 4.3 1.8 - 8.0 K/UL    ABS. LYMPHOCYTES 2.2 0.9 - 3.6 K/UL    ABS. MONOCYTES 0.7 0.05 - 1.2 K/UL    ABS. EOSINOPHILS 0.1 0.0 - 0.4 K/UL    ABS. BASOPHILS 0.0 0.0 - 0.1 K/UL    DF AUTOMATED     METABOLIC PANEL, COMPREHENSIVE    Collection Time: 10/23/19  5:50 PM   Result Value Ref Range    Sodium 135 (L) 136 - 145 mmol/L    Potassium 2.9 (LL) 3.5 - 5.5 mmol/L    Chloride 100 100 - 111 mmol/L    CO2 30 21 - 32 mmol/L    Anion gap 5 3.0 - 18 mmol/L    Glucose 101 (H) 74 - 99 mg/dL    BUN 19 (H) 7.0 - 18 MG/DL    Creatinine 0.69 0.6 - 1.3 MG/DL    BUN/Creatinine ratio 28 (H) 12 - 20      GFR est AA >60 >60 ml/min/1.73m2    GFR est non-AA >60 >60 ml/min/1.73m2    Calcium 10.1 8.5 - 10.1 MG/DL    Bilirubin, total 0.3 0.2 - 1.0 MG/DL    ALT (SGPT) 26 13 - 56 U/L    AST (SGOT) 20 10 - 38 U/L    Alk.  phosphatase 96 45 - 117 U/L    Protein, total 9.1 (H) 6.4 - 8.2 g/dL    Albumin 3.8 3.4 - 5.0 g/dL    Globulin 5.3 (H) 2.0 - 4.0 g/dL    A-G Ratio 0.7 (L) 0.8 - 1.7     TROPONIN I    Collection Time: 10/23/19  5:50 PM   Result Value Ref Range    Troponin-I, QT <0.02 0.0 - 0.045 NG/ML   TSH 3RD GENERATION    Collection Time: 10/23/19  5:50 PM   Result Value Ref Range    TSH 1.76 0.36 - 3.74 uIU/mL       Radiologic Studies -   XR CHEST PA LAT    (Results Pending)         Medical Decision Making   I am the first provider for this patient. I reviewed the vital signs, available nursing notes, past medical history, past surgical history, family history and social history. Vital Signs-Reviewed the patient's vital signs. Records Reviewed: Nursing Notes and Old Medical Records (Time of Review: 5:24 PM)    ED Course: Progress Notes, Reevaluation, and Consults:  PROGRESS NOTE:  6:47 PM   Patient care will be transferred to Pretty Landrum NP. Discussed available diagnostic results and care plan at length. Pending Mg, UA and potassium administration. Written by Michael Patricia PA-C    Diagnosis     Clinical Impression:   1. Nonintractable episodic headache, unspecified headache type    2. Weakness    3. Hypokalemia        Disposition: home     Follow-up Information     Follow up With Specialties Details Why Contact Info    [facility] EMERGENCY DEPT Emergency Medicine  If symptoms worsen [address] [facility]    Dennys Machuca NP Nurse Practitioner In 2 days  [id number]  27 [phone]             Patient's Medications   Start Taking    POTASSIUM CHLORIDE (K-DUR, KLOR-CON) 20 MEQ TABLET    Take 1 Tab by mouth three (3) times daily for 3 days. Continue Taking    AMLODIPINE (NORVASC) 10 MG TABLET    Take 10 mg by mouth daily. ASPIRIN 81 MG CHEWABLE TABLET    Take 81 mg by mouth daily. DICLOFENAC (VOLTAREN) 1 % GEL    Apply 4 g to affected area four (4) times daily. HYDROCHLOROTHIAZIDE (HYDRODIURIL) 25 MG TABLET    Take 25 mg by mouth daily. SERTRALINE (ZOLOFT) 100 MG TABLET    Take 100 mg by mouth daily.    These Medications have changed    No medications on file   Stop Taking    No medications on file       Dictation disclaimer:  Please note that this dictation was completed with [employer], the computer voice recognition software. Quite often unanticipated grammatical, syntax, homophones, and other interpretive errors are inadvertently transcribed by the computer software. Please disregard these errors. Please excuse any errors that have escaped final proofreading. Progress note    I assumed care of patient at 1900 with only UA pending patient was treated for hypokalemia and headache she is improved and will be discharged to home with a prescription for 7 days of potassium and a prescription for Fioricet for headache. She is to follow-up with primary care return to the emergency room if worse.       Castillo Valencia ENP-C, FNP-C

## 2019-10-23 NOTE — ED NOTES
Pt states has had a constant, throbbing h/a due to stress at home. Complaint of nausea but no report of emesis.

## 2019-10-24 ENCOUNTER — HOSPITAL ENCOUNTER (OUTPATIENT)
Dept: PHYSICAL THERAPY | Age: 69
Discharge: HOME | End: 2019-10-24
Payer: MEDICARE

## 2019-10-24 LAB
ATRIAL RATE: 86 BPM
CALCULATED P AXIS, ECG09: 56 DEGREES
CALCULATED R AXIS, ECG10: 13 DEGREES
CALCULATED T AXIS, ECG11: 40 DEGREES
DIAGNOSIS, 93000: NORMAL
P-R INTERVAL, ECG05: 148 MS
Q-T INTERVAL, ECG07: 374 MS
QRS DURATION, ECG06: 94 MS
QTC CALCULATION (BEZET), ECG08: 447 MS
VENTRICULAR RATE, ECG03: 86 BPM

## 2019-10-24 PROCEDURE — 97110 THERAPEUTIC EXERCISES: CPT | Performed by: GENERAL ACUTE CARE HOSPITAL

## 2019-10-24 NOTE — PROGRESS NOTES
PT DAILY TREATMENT NOTE     Patient Name: Jessica Mcgovern  Date:10/24/2019  : 1950  [x]  Patient  Verified  Payor: Claribel Bella / Plan: [address] [place] [address] HMO / Product Type: Managed Care Medicare /    In time: 3:30  Out time: 4:15  Total Treatment Time (min): 45  Total Timed Codes (min): 35   1:1 Treatment Time (min): (30) 3:35-4:05  Visit #: 3 of 10    Treatment Area: Pain in left knee [M25.562]  Unilateral primary osteoarthritis, left knee [M17.12]    SUBJECTIVE  Pain Level (0-10 scale): 5/10  Any medication changes, allergies to medications, adverse drug reactions, diagnosis change, or new procedure performed?: [x] No    [] Yes (see summary sheet for update)  Subjective functional status/changes:   [] No changes reported  Patient states she went to the ER for recurrent HA symptoms and was prescribed Benadryl. She is concerned about taking both Benadryl and her depression medication because they both cause drowsiness.  Encouraged patient to call her pharmacist.     OBJECTIVE  Modality rationale: decrease edema, decrease inflammation and decrease pain to improve the patients ability to perform functional LE tasks   Min Type Additional Details    [] Estim: []Att   []Unatt  []TENS instruct                 []IFC  []Premod []NMES                       []Other:  []w/US   []w/ice   []w/heat  Position:  Location:    []  Traction: [] Cervical       []Lumbar                       [] Prone          []Supine                       []Intermittent   []Continuous Lbs:  [] before manual  [] after manual    []  Ultrasound: []Continuous   [] Pulsed                           []1MHz   []3MHz Location:  W/cm2:    []  Iontophoresis with dexamethasone         Location: [] Take home patch   [] In clinic   10 [x]  Ice     []  heat  []  Ice massage Position: semi reclined  Location:L knee    []  Vasopneumatic Device Pressure: [] lo [] med [] hi   Temp: [] lo [] med [] hi   [x] Skin assessment post-treatment: [x]intact []redness- no adverse reaction       []redness  adverse reaction:       30/35 min Therapeutic Exercise:  [] See flow sheet :   Rationale: increase ROM and increase strength to improve the patients ability to perform functional LE tasks           min Patient Education: [x] Review HEP    [] Progressed/Changed HEP based on:   [] positioning   [] body mechanics   [] transfers   [] heat/ice application        Other Objective/Functional Measures:   50% bridge  Added sit to stands from 21\" mat table with arms across chest  Romberg EC with minimal sway- will add foam next session    Pain Level (0-10 scale) post treatment: 5/10    ASSESSMENT/Changes in Function:   Good tolerance to treatment today with general fatigue at end of session. Reduced pain rating from previous session, and patient reporting less pain during daily activities. Will continue to benefit from progression of therex for LE strength and balance. Patient will continue to benefit from skilled PT services to modify and progress therapeutic interventions, address functional mobility deficits, address ROM deficits, address strength deficits, analyze and address soft tissue restrictions and analyze and cue movement patterns to attain remaining goals. []  See Plan of Care  []  See progress note/recertification  []  See Discharge Summary         Progress towards goals / Updated goals: · Short Term Goals: To be accomplished in  2  treatments:  1. Pt will be compliant with HEP for symptom management at home.     · Long Term Goals: To be accomplished in  10  treatments:  1. Pt will demonstrate an increased FOTO score to TBA/100 in order to improve function. 2. Pt will be independent with HEP at D/C for self management. 3. Pt will be able to ambulate 250ft continuously without AD/LE buckling/LOB/rest break in order to return to community ambulation and her daily walking regimen  4.  Pt will demonstrate increased left knee flexion and extension strength to > 4+/5 in order to assist with ambulation and transfers  5.  Pt will demonstrate a proper squat and lift of 10lbs without valgus collapse in order to lift groceries without pain    PLAN  []  Upgrade activities as tolerated     []  Continue plan of care  []  Update interventions per flow sheet       []  Discharge due to:_  []  Other:_      Samuel Martinez, PT 10/24/2019  2:26 PM

## 2019-10-28 ENCOUNTER — HOSPITAL ENCOUNTER (EMERGENCY)
Age: 69
Discharge: HOME | End: 2019-10-28
Attending: EMERGENCY MEDICINE
Payer: MEDICARE

## 2019-10-28 VITALS
TEMPERATURE: 98 F | HEART RATE: 85 BPM | RESPIRATION RATE: 16 BRPM | OXYGEN SATURATION: 100 % | SYSTOLIC BLOOD PRESSURE: 161 MMHG | DIASTOLIC BLOOD PRESSURE: 80 MMHG

## 2019-10-28 DIAGNOSIS — I10 ELEVATED BLOOD PRESSURE READING WITH DIAGNOSIS OF HYPERTENSION: ICD-10-CM

## 2019-10-28 DIAGNOSIS — F41.8 ANXIETY ASSOCIATED WITH DEPRESSION: Primary | ICD-10-CM

## 2019-10-28 LAB
ALBUMIN SERPL-MCNC: 3.5 G/DL (ref 3.4–5)
ALBUMIN/GLOB SERPL: 0.7 {RATIO} (ref 0.8–1.7)
ALP SERPL-CCNC: 88 U/L (ref 45–117)
ALT SERPL-CCNC: 28 U/L (ref 13–56)
AMPHET UR QL SCN: NEGATIVE
ANION GAP SERPL CALC-SCNC: 4 MMOL/L (ref 3–18)
APPEARANCE UR: CLEAR
AST SERPL-CCNC: 18 U/L (ref 10–38)
BACTERIA URNS QL MICRO: ABNORMAL /HPF
BARBITURATES UR QL SCN: NEGATIVE
BASOPHILS # BLD: 0 K/UL (ref 0–0.1)
BASOPHILS NFR BLD: 1 % (ref 0–2)
BENZODIAZ UR QL: NEGATIVE
BILIRUB SERPL-MCNC: 0.2 MG/DL (ref 0.2–1)
BILIRUB UR QL: NEGATIVE
BUN SERPL-MCNC: 13 MG/DL (ref 7–18)
BUN/CREAT SERPL: 22 (ref 12–20)
CALCIUM SERPL-MCNC: 9.7 MG/DL (ref 8.5–10.1)
CANNABINOIDS UR QL SCN: NEGATIVE
CHLORIDE SERPL-SCNC: 106 MMOL/L (ref 100–111)
CO2 SERPL-SCNC: 28 MMOL/L (ref 21–32)
COCAINE UR QL SCN: NEGATIVE
COLOR UR: YELLOW
CREAT SERPL-MCNC: 0.58 MG/DL (ref 0.6–1.3)
DIFFERENTIAL METHOD BLD: ABNORMAL
EOSINOPHIL # BLD: 0 K/UL (ref 0–0.4)
EOSINOPHIL NFR BLD: 0 % (ref 0–5)
EPITH CASTS URNS QL MICRO: ABNORMAL /LPF (ref 0–5)
ERYTHROCYTE [DISTWIDTH] IN BLOOD BY AUTOMATED COUNT: 15 % (ref 11.6–14.5)
ETHANOL SERPL-MCNC: <3 MG/DL (ref 0–3)
GLOBULIN SER CALC-MCNC: 5 G/DL (ref 2–4)
GLUCOSE SERPL-MCNC: 97 MG/DL (ref 74–99)
GLUCOSE UR STRIP.AUTO-MCNC: NEGATIVE MG/DL
HCT VFR BLD AUTO: 35.4 % (ref 35–45)
HDSCOM,HDSCOM: NORMAL
HGB BLD-MCNC: 12.2 G/DL (ref 12–16)
HGB UR QL STRIP: NEGATIVE
KETONES UR QL STRIP.AUTO: NEGATIVE MG/DL
LEUKOCYTE ESTERASE UR QL STRIP.AUTO: ABNORMAL
LYMPHOCYTES # BLD: 1.9 K/UL (ref 0.9–3.6)
LYMPHOCYTES NFR BLD: 33 % (ref 21–52)
MCH RBC QN AUTO: 29.5 PG (ref 24–34)
MCHC RBC AUTO-ENTMCNC: 34.5 G/DL (ref 31–37)
MCV RBC AUTO: 85.5 FL (ref 74–97)
METHADONE UR QL: NEGATIVE
MONOCYTES # BLD: 0.5 K/UL (ref 0.05–1.2)
MONOCYTES NFR BLD: 9 % (ref 3–10)
NEUTS SEG # BLD: 3.2 K/UL (ref 1.8–8)
NEUTS SEG NFR BLD: 57 % (ref 40–73)
NITRITE UR QL STRIP.AUTO: NEGATIVE
OPIATES UR QL: NEGATIVE
PCP UR QL: NEGATIVE
PH UR STRIP: 8.5 [PH] (ref 5–8)
PLATELET # BLD AUTO: 202 K/UL (ref 135–420)
PMV BLD AUTO: 11.3 FL (ref 9.2–11.8)
POTASSIUM SERPL-SCNC: 4 MMOL/L (ref 3.5–5.5)
PROT SERPL-MCNC: 8.5 G/DL (ref 6.4–8.2)
PROT UR STRIP-MCNC: NEGATIVE MG/DL
RBC # BLD AUTO: 4.14 M/UL (ref 4.2–5.3)
RBC #/AREA URNS HPF: NEGATIVE /HPF (ref 0–5)
SODIUM SERPL-SCNC: 138 MMOL/L (ref 136–145)
SP GR UR REFRACTOMETRY: 1.01 (ref 1–1.03)
UROBILINOGEN UR QL STRIP.AUTO: 0.2 EU/DL (ref 0.2–1)
WBC # BLD AUTO: 5.7 K/UL (ref 4.6–13.2)
WBC URNS QL MICRO: ABNORMAL /HPF (ref 0–4)

## 2019-10-28 PROCEDURE — 94762 N-INVAS EAR/PLS OXIMTRY CONT: CPT

## 2019-10-28 PROCEDURE — 99285 EMERGENCY DEPT VISIT HI MDM: CPT

## 2019-10-28 PROCEDURE — 80053 COMPREHEN METABOLIC PANEL: CPT

## 2019-10-28 PROCEDURE — 85025 COMPLETE CBC W/AUTO DIFF WBC: CPT

## 2019-10-28 PROCEDURE — 80307 DRUG TEST PRSMV CHEM ANLYZR: CPT

## 2019-10-28 PROCEDURE — 81001 URINALYSIS AUTO W/SCOPE: CPT

## 2019-10-28 RX ORDER — LORAZEPAM 0.5 MG/1
0.5 TABLET ORAL
Qty: 4 TAB | Refills: 0 | Status: SHIPPED | OUTPATIENT
Start: 2019-10-28 | End: 2020-02-22

## 2019-10-28 NOTE — BSMART NOTE
Comprehensive Assessment Form Part 1 Integrated Summary Patient is a 78year old female who presented to the emergency room with c/o \"back pain,\" then stated that she did not want to return home because, \"I got a lot of stuff going on. ..my father left me the house. ..the house needs repairs. ..and I just got help from the [employer] to have the house sprayed for bedbugs. I had to take family members to court to get them to leave the house. My brother keeps calling me to live at the house, but he don't want to keep a job. The house is just so stressful. \" 
 
Mental Status Exam 
 
The patient's appearance is unkempt. The patient's behavior shows no evidence of impairment. The patient is oriented to time, place, person and situation. The patient's speech is normal.  The patient's mood is depressed and is slightly irritable. The range of affect is frowning. The patient's thought content demonstrates no evidence of impairment. The thought process shows no evidence of impairment. The patient's perception shows no evidence of impairment. The patient's memory shows no evidence of impairment. The patient's appetite shows no evidence of impairment. The patient's sleep shows no evidence of impairment. The patient's insight shows no evidence of impairment. The patient's judgement shows no evidence of impairment. DME: Patient uses a 4 prong cane to assist with mobility. Access to weapons: Denied This writer spoke with the on-call psychiatrist who agreed that patient does not meet criteria for inpatient admission; also, spoke with Magda Arriaga, [address] [place] [address], [phone], who stated that patient does not meet admission for nursing home placement at this time. Outpatient Treatment: \"Dr. Aaron Be, [facility]; last appointment a couple weeks, ago. \" Disposition Discussed with Dr. Bart Boone, patient does not meet criteria for acute psychiatric admission. Patient will follow-up with Dr. Derrick Huston, [facility]. Patient discharged per ED.   
 
Abhishek Tabor RN, BSN

## 2019-10-28 NOTE — ED NOTES
I have reviewed discharge instructions with the patient. The patient verbalized understanding. Patient stated \"Ami wait to eat before I go\" Patient given meal tray to eat.

## 2019-10-28 NOTE — ED PROVIDER NOTES
EMERGENCY DEPARTMENT HISTORY AND PHYSICAL EXAM    12:47 PM      Date: 10/28/2019  Patient Name: Rika Ferrer    History of Presenting Illness     Chief Complaint   Patient presents with    Back Pain         History Provided By: Patient    Additional History (Context): Rika Ferrer is a 78 y.o. female with Past medical history of depression, hypertension who presents with chief complaint of \"things are not right at home and I am not going back there\". Apparently initial call for EMS was for back pain when patient arrived here she states she she does not have any back pain and that she has been very stressed out and sad. She states that she is taking her medication for depression as prescribed. She does report having some bugs in her home that have been sprayed for. He states she has not seen any more bugs. Patient keeps repeating that she does not want to go back home and that things on her right there. She states that she lives in the old family home and that no one lives with her. She denies any type of mistreatment by any one. She denies any suicidal thoughts, homicidal thoughts, dysuria, fever, cough, nausea, abdominal pain and no other complaints. PCP: Lorie Du NP        Past History     Past Medical History:  Past Medical History:   Diagnosis Date    Depression     High cholesterol     Hypertension        Past Surgical History:  No past surgical history on file. Family History:  No family history on file. Social History:  Social History     Tobacco Use    Smoking status: Never Smoker    Smokeless tobacco: Never Used   Substance Use Topics    Alcohol use: Not Currently    Drug use: Never       Allergies:   Allergies   Allergen Reactions    Aleve [Naproxen Sodium] Unable to Obtain    Bactrim [Sulfamethoprim] Diarrhea    Feldene [Piroxicam] Unknown (comments)     Shaking      Penicillins Rash         Review of Systems       Review of Systems   Constitutional: Negative for chills and fever. HENT: Negative for congestion, rhinorrhea, sore throat and trouble swallowing. Eyes: Negative for visual disturbance. Respiratory: Negative for cough, shortness of breath and wheezing. Cardiovascular: Negative for chest pain. Gastrointestinal: Negative for abdominal pain, nausea and vomiting. Endocrine: Negative for polyuria. Genitourinary: Negative for dysuria. Musculoskeletal: Negative for arthralgias and neck stiffness. Skin: Negative for pallor and rash. Neurological: Negative for dizziness, weakness, numbness and headaches. Hematological: Does not bruise/bleed easily. Psychiatric/Behavioral: Positive for agitation, dysphoric mood and suicidal ideas. Negative for confusion. All other systems reviewed and are negative. Physical Exam     Visit Vitals  /80   Pulse 85   Temp 98 °F (36.7 °C)   Resp 16   SpO2 100%         Physical Exam   Constitutional: She is oriented to person, place, and time. She appears well-developed and well-nourished. No distress. HENT:   Head: Normocephalic and atraumatic. Mouth/Throat: Oropharynx is clear and moist.   Eyes: Pupils are equal, round, and reactive to light. Conjunctivae are normal. No scleral icterus. Neck: Normal range of motion. Neck supple. Cardiovascular: Normal rate and intact distal pulses. Capillary refill < 3 seconds   Pulmonary/Chest: Effort normal and breath sounds normal. No respiratory distress. She has no wheezes. Abdominal: Soft. Bowel sounds are normal. She exhibits no distension. There is no tenderness. Musculoskeletal: Normal range of motion. She exhibits no edema or tenderness. No back tenderness to palpation  No bony tenderness  Full range of motion head neck   Lymphadenopathy:     She has no cervical adenopathy. Neurological: She is alert and oriented to person, place, and time. No cranial nerve deficit. Skin: Skin is warm and dry. She is not diaphoretic.    Psychiatric:   Anxious appearing  Sad appearing, teary-eyed at the bedside  Patient keeps reaching and grabbing me with her hand and seems sad and begins to tear up stating \"I cannot go back there, can't go back. \"   Nursing note and vitals reviewed. Diagnostic Study Results     Labs -  Recent Results (from the past 12 hour(s))   CBC WITH AUTOMATED DIFF    Collection Time: 10/28/19  1:35 PM   Result Value Ref Range    WBC 5.7 4.6 - 13.2 K/uL    RBC 4.14 (L) 4.20 - 5.30 M/uL    HGB 12.2 12.0 - 16.0 g/dL    HCT 35.4 35.0 - 45.0 %    MCV 85.5 74.0 - 97.0 FL    MCH 29.5 24.0 - 34.0 PG    MCHC 34.5 31.0 - 37.0 g/dL    RDW 15.0 (H) 11.6 - 14.5 %    PLATELET 573 131 - 710 K/uL    MPV 11.3 9.2 - 11.8 FL    NEUTROPHILS 57 40 - 73 %    LYMPHOCYTES 33 21 - 52 %    MONOCYTES 9 3 - 10 %    EOSINOPHILS 0 0 - 5 %    BASOPHILS 1 0 - 2 %    ABS. NEUTROPHILS 3.2 1.8 - 8.0 K/UL    ABS. LYMPHOCYTES 1.9 0.9 - 3.6 K/UL    ABS. MONOCYTES 0.5 0.05 - 1.2 K/UL    ABS. EOSINOPHILS 0.0 0.0 - 0.4 K/UL    ABS. BASOPHILS 0.0 0.0 - 0.1 K/UL    DF AUTOMATED     METABOLIC PANEL, COMPREHENSIVE    Collection Time: 10/28/19  1:35 PM   Result Value Ref Range    Sodium 138 136 - 145 mmol/L    Potassium 4.0 3.5 - 5.5 mmol/L    Chloride 106 100 - 111 mmol/L    CO2 28 21 - 32 mmol/L    Anion gap 4 3.0 - 18 mmol/L    Glucose 97 74 - 99 mg/dL    BUN 13 7.0 - 18 MG/DL    Creatinine 0.58 (L) 0.6 - 1.3 MG/DL    BUN/Creatinine ratio 22 (H) 12 - 20      GFR est AA >60 >60 ml/min/1.73m2    GFR est non-AA >60 >60 ml/min/1.73m2    Calcium 9.7 8.5 - 10.1 MG/DL    Bilirubin, total 0.2 0.2 - 1.0 MG/DL    ALT (SGPT) 28 13 - 56 U/L    AST (SGOT) 18 10 - 38 U/L    Alk.  phosphatase 88 45 - 117 U/L    Protein, total 8.5 (H) 6.4 - 8.2 g/dL    Albumin 3.5 3.4 - 5.0 g/dL    Globulin 5.0 (H) 2.0 - 4.0 g/dL    A-G Ratio 0.7 (L) 0.8 - 1.7     ETHYL ALCOHOL    Collection Time: 10/28/19  1:35 PM   Result Value Ref Range    ALCOHOL(ETHYL),SERUM <3 0 - 3 MG/DL   URINALYSIS W/ RFLX MICROSCOPIC Collection Time: 10/28/19  2:02 PM   Result Value Ref Range    Color YELLOW      Appearance CLEAR      Specific gravity 1.010 1.005 - 1.030      pH (UA) 8.5 (H) 5.0 - 8.0      Protein NEGATIVE  NEG mg/dL    Glucose NEGATIVE  NEG mg/dL    Ketone NEGATIVE  NEG mg/dL    Bilirubin NEGATIVE  NEG      Blood NEGATIVE  NEG      Urobilinogen 0.2 0.2 - 1.0 EU/dL    Nitrites NEGATIVE  NEG      Leukocyte Esterase SMALL (A) NEG     URINE MICROSCOPIC ONLY    Collection Time: 10/28/19  2:02 PM   Result Value Ref Range    WBC 0 to 2 0 - 4 /hpf    RBC NEGATIVE  0 - 5 /hpf    Epithelial cells FEW 0 - 5 /lpf    Bacteria FEW (A) NEG /hpf   DRUG SCREEN, URINE    Collection Time: 10/28/19  2:02 PM   Result Value Ref Range    BENZODIAZEPINES NEGATIVE  NEG      BARBITURATES NEGATIVE  NEG      THC (TH-CANNABINOL) NEGATIVE  NEG      OPIATES NEGATIVE  NEG      PCP(PHENCYCLIDINE) NEGATIVE  NEG      COCAINE NEGATIVE  NEG      AMPHETAMINES NEGATIVE  NEG      METHADONE NEGATIVE  NEG      HDSCOM (NOTE)        Radiologic Studies -   No orders to display         Medical Decision Making   I am the first provider for this patient. I reviewed the vital signs, available nursing notes, past medical history, past surgical history, family history and social history. Vital Signs-Reviewed the patient's vital signs. Records Reviewed: Nursing Notes and Old Medical Records (Time of Review: 12:47 PM)    Provider Notes (Medical Decision Making): DDX: Metabolic, UTI, depression, acute psychosis    We will get labs, I feel patient needs to be seen by crisis    She is cooperative but very sad appearing and keeps reaching for me when I attempt to leave the room. She denies that anyone lives with her and not hearing any voices. MDM    Medications - No data to display        ED Course: Progress Notes, Reevaluation, and Consults:  Labs reassuring  Negative for EtOH    4:10 PM I discussed case results with Kathleen Lisa from crisis.   She states that she will have Kellen Nguyen come see the patient as she is coming in with shift change. 5:15 PM patient has been seen by Kellen Nguyen from crisis. She states that she is making phone call to care management to assist with disposition. 5:49 PM  He states that patient does not meet any criteria for admission. Can discharge patient home. She states patient has a psychiatrist that she will follow-up with. I have reassessed the patient. I have discussed the workup, results and plan with the patient and patient is in agreement. Patient has no new complaint. Patient will be prescribed Ativan 0.5 mg take 1 daily as needed for anxiety, 4 tablets given. Patient was discharge in stable condition. Patient was given outpatient follow up. Patient is to return to emergency department if any new or worsening condition. Diagnosis     Clinical Impression:   1. Anxiety associated with depression    2. Elevated blood pressure reading with diagnosis of hypertension        Disposition: Discharged    Follow-up Information     Follow up With Specialties Details Why Contact Info    Your psychiatrist  Schedule an appointment as soon as possible for a visit in 1 day      Alyx Martinez NP Nurse Practitioner Schedule an appointment as soon as possible for a visit in 2 days  [address] [facility] [address]  [phone]      [facility] EMERGENCY DEPT Emergency Medicine  As needed, If symptoms worsen [address] [place] [id number]  [phone]           Discharge Medication List as of 10/28/2019  6:13 PM      START taking these medications    Details   LORazepam (ATIVAN) 0.5 mg tablet Take 1 Tab by mouth daily as needed for Anxiety. , Print, Disp-4 Tab, R-0         CONTINUE these medications which have NOT CHANGED    Details   sertraline (ZOLOFT) 100 mg tablet Take 100 mg by mouth daily. , Historical Med      potassium chloride SR (K-TAB) 20 mEq tablet Take 1 Tab by mouth two (2) times a day for 7 days. , Print, Disp-14 Tab, R-0 butalbital-acetaminophen-caff (FIORICET) -40 mg per capsule Take 1 Cap by mouth every six (6) hours as needed for Pain., Print, Disp-15 Cap, R-0      diclofenac (VOLTAREN) 1 % gel Apply 4 g to affected area four (4) times daily. , Normal, Disp-100 g, R-0      hydroCHLOROthiazide (HYDRODIURIL) 25 mg tablet Take 25 mg by mouth daily. , Historical Med      amLODIPine (NORVASC) 10 mg tablet Take 10 mg by mouth daily. , Historical Med      aspirin 81 mg chewable tablet Take 81 mg by mouth daily. , Historical Med               Katty Smith DO    Dragon medical dictation software was used for portions of this report. Unintended transcription errors may occur. My signature above authenticates this document and my orders, the final    diagnosis (es), discharge prescription (s), and instructions in the Epic    record.

## 2019-10-28 NOTE — DISCHARGE INSTRUCTIONS
Patient Education      If you were prescribed any medication take as directed. Follow up with your primary care physician or with specialist as directed. Return to the emergency room with any new or worsening conditions. Learning About Anxiety Disorders  What are anxiety disorders? Anxiety disorders are a type of medical problem. They cause severe anxiety. When you feel anxious, you feel that something bad is about to happen. This feeling interferes with your life. These disorders include:  · Generalized anxiety disorder. You feel worried and stressed about many everyday events and activities. This goes on for several months and disrupts your life on most days. · Panic disorder. You have repeated panic attacks. A panic attack is a sudden, intense fear or anxiety. It may make you feel short of breath. Your heart may pound. · Social anxiety disorder. You feel very anxious about what you will say or do in front of people. For example, you may be scared to talk or eat in public. This problem affects your daily life. · Phobias. You are very scared of a specific object, situation, or activity. For example, you may fear spiders, high places, or small spaces. What are the symptoms? Generalized anxiety disorder  Symptoms may include:  · Feeling worried and stressed about many things almost every day. · Feeling tired or irritable. You may have a hard time concentrating. · Having headaches or muscle aches. · Having a hard time getting to sleep or staying asleep. Panic disorder  You may have repeated panic attacks when there is no reason for feeling afraid. You may change your daily activities because you worry that you will have another attack. Symptoms may include:  · Intense fear, terror, or anxiety. · Trouble breathing or very fast breathing. · Chest pain or tightness. · A heartbeat that races or is not regular.   Social anxiety disorder  Symptoms may include:  · Fear about a social situation, such as eating in front of others or speaking in public. You may worry a lot. Or you may be afraid that something bad will happen. · Anxiety that can cause you to blush, sweat, and feel shaky. · A heartbeat that is faster than normal.  · A hard time focusing. Phobias  Symptoms may include:  · More fear than most people of being around an object, being in a situation, or doing an activity. You might also be stressed about the chance of being around the thing you fear. · Worry about losing control, panicking, fainting, or having physical symptoms like a faster heartbeat when you are around the situation or object. How are these disorders treated? Anxiety disorders can be treated with medicines or counseling. A combination of both may be used. Medicines may include:  · Antidepressants. These may help your symptoms by keeping chemicals in your brain in balance. · Benzodiazepines. These may give you short-term relief of your symptoms. Some people use cognitive-behavioral therapy. A therapist helps you learn to change stressful or bad thoughts into helpful thoughts. Lead a healthy lifestyle  A healthy lifestyle may help you feel better. · Get at least 30 minutes of exercise on most days of the week. Walking is a good choice. · Eat a healthy diet. Include fruits, vegetables, lean proteins, and whole grains in your diet each day. · Try to go to bed at the same time every night. Try for 8 hours of sleep a night. · Find ways to manage stress. Try relaxation exercises. · Avoid alcohol and illegal drugs. Follow-up care is a key part of your treatment and safety. Be sure to make and go to all appointments, and call your doctor if you are having problems. It's also a good idea to know your test results and keep a list of the medicines you take. Where can you learn more? Go to http://bri-khari.info/. Enter Z797 in the search box to learn more about \"Learning About Anxiety Disorders. \"  Current as of: May 28, 2019  Content Version: 12.2  © 0999-6034 [employer]. Care instructions adapted under license by [employer] (which disclaims liability or warranty for this information). If you have questions about a medical condition or this instruction, always ask your healthcare professional. [address] any warranty or liability for your use of this information.

## 2019-10-28 NOTE — ED TRIAGE NOTES
Patient brought in by EMS with orignal c/o back pain. As EMS and this nurse assisted transfering patient from EMS stretcher to ED 8 bed, patient started stating \"I can't go back to the house. I can't go back to the house. It is too much stress. \" Patient AOx3 and able to transfer with assistance and cane. Patient denies Chest pain, shortness of breath.

## 2019-10-30 ENCOUNTER — APPOINTMENT (OUTPATIENT)
Dept: PHYSICAL THERAPY | Age: 69
End: 2019-10-30
Payer: MEDICARE

## 2019-10-31 ENCOUNTER — HOSPITAL ENCOUNTER (OUTPATIENT)
Dept: PHYSICAL THERAPY | Age: 69
Discharge: HOME | End: 2019-10-31
Payer: MEDICARE

## 2019-10-31 PROCEDURE — 97110 THERAPEUTIC EXERCISES: CPT

## 2019-10-31 NOTE — PROGRESS NOTES
PT DAILY TREATMENT NOTE 10-18    Patient Name: Chucky Edwards  Date:10/31/2019  : 1950  [x]  Patient  Verified  Payor: Luis Simon / Plan: [address] [place] [address] HMO / Product Type: Managed Care Medicare /    In time:250  Out time:332  Total Treatment Time (min): 42  Visit #: 4 of 10    Medicare/BCBS Only   Total Timed Codes (min):  32 1:1 Treatment Time:  32       Treatment Area: Pain in left knee [M25.562]  Unilateral primary osteoarthritis, left knee [M17.12]    SUBJECTIVE  Pain Level (0-10 scale): 10  Any medication changes, allergies to medications, adverse drug reactions, diagnosis change, or new procedure performed?: [x] No    [] Yes (see summary sheet for update)  Subjective functional status/changes:   [] No changes reported  Pt stated that she is having some pain in the left knee today    OBJECTIVE    Modality rationale: decrease inflammation and decrease pain to improve the patients ability to increase ease with ADLs   Min Type Additional Details    [] Estim:  []Unatt       []IFC  []Premod                        []Other:  []w/ice   []w/heat  Position:  Location:    [] Estim: []Att    []TENS instruct  []NMES                    []Other:  []w/US   []w/ice   []w/heat  Position:  Location:    []  Traction: [] Cervical       []Lumbar                       [] Prone          []Supine                       []Intermittent   []Continuous Lbs:  [] before manual  [] after manual    []  Ultrasound: []Continuous   [] Pulsed                           []1MHz   []3MHz W/cm2:  Location:    []  Iontophoresis with dexamethasone         Location: [] Take home patch   [] In clinic   10 [x]  Ice     []  heat  []  Ice massage  []  Laser   []  Anodyne Position:supine  Location:left knee    []  Laser with stim  []  Other:  Position:  Location:    []  Vasopneumatic Device Pressure:       [] lo [] med [] hi   Temperature: [] lo [] med [] hi   [x] Skin assessment post-treatment:  [x]intact []redness- no adverse reaction    []redness  adverse reaction:     32 min Therapeutic Exercise:  [x] See flow sheet :   Rationale: increase ROM and increase strength to improve the patients ability to increase ease with ADLs    With   [x] TE   [] TA   [] neuro   [] other: Patient Education: [x] Review HEP    [] Progressed/Changed HEP based on:   [] positioning   [] body mechanics   [] transfers   [] heat/ice application    [] other:      Other Objective/Functional Measures:   Had no difficulty with exercises  No complaint of increased pain during session   Minimal difficulty with sit to stands, but was able to perform 10 without UE support  Started plopping when fatigued with sit to stands     Pain Level (0-10 scale) post treatment: 5/10    ASSESSMENT/Changes in Function:   Pt is slowly progressing toward goals. Pt cont with moderate pain in the left knee. Cont to ambulate with quad cane. Pt cont with decreased walking tolerance. Pt reported that she cont with inability to squat to the floor to pick something up. Patient will continue to benefit from skilled PT services to modify and progress therapeutic interventions, address functional mobility deficits, address ROM deficits, address strength deficits, analyze and cue movement patterns, analyze and modify body mechanics/ergonomics, assess and modify postural abnormalities and instruct in home and community integration to attain remaining goals. []  See Plan of Care  [x]  See progress note/recertification  []  See Discharge Summary         Progress towards goals / Updated goals:    Short Term Goals: To be accomplished in  2  treatments:  1. Pt will be compliant with HEP for symptom management at home.     Long Term Goals: To be accomplished in  10  treatments:  1. Pt will demonstrate an increased FOTO score to TBA/100 in order to improve function. 2. Pt will be independent with HEP at D/C for self management.   3. Pt will be able to ambulate 250ft continuously without AD/LE buckling/LOB/rest break in order to return to community ambulation and her daily walking regimen  4. Pt will demonstrate increased left knee flexion and extension strength to > 4+/5 in order to assist with ambulation and transfers  5. Pt will demonstrate a proper squat and lift of 10lbs without valgus collapse in order to lift groceries without pain    PLAN  []  Upgrade activities as tolerated     [x]  Continue plan of care  []  Update interventions per flow sheet       []  Discharge due to:_  []  Other:_      Pat Kitchen, EMMA 10/31/2019  2:34 PM    Future Appointments   Date Time Provider Bharat Mcgregori   10/31/2019  3:00 PM Phylliss Coast, PTA [id number]   11/5/2019  2:30 PM Drew Ackerman, PT [id number]   11/7/2019  2:00 PM Gita Ripa, PT [id number] [facility]   11/12/2019  2:30 PM Gita Ripa, PT [id number] [facility]   11/14/2019  2:00 PM Phylliss Coast, PTA [id number]   11/19/2019  2:00 PM Gita Ripa, PT [id number] [facility]   11/21/2019  2:00 PM Phylliss Coast, PTA [id number]   11/26/2019  2:30 PM Subhash Miller, PT [id number]

## 2019-11-05 ENCOUNTER — HOSPITAL ENCOUNTER (OUTPATIENT)
Dept: PHYSICAL THERAPY | Age: 69
Discharge: HOME | End: 2019-11-05
Payer: MEDICARE

## 2019-11-05 ENCOUNTER — HOSPITAL ENCOUNTER (EMERGENCY)
Age: 69
Discharge: HOME | End: 2019-11-05
Attending: EMERGENCY MEDICINE
Payer: MEDICARE

## 2019-11-05 VITALS
HEART RATE: 70 BPM | BODY MASS INDEX: 28.17 KG/M2 | OXYGEN SATURATION: 97 % | TEMPERATURE: 98.2 F | HEIGHT: 64 IN | RESPIRATION RATE: 22 BRPM | DIASTOLIC BLOOD PRESSURE: 74 MMHG | WEIGHT: 165 LBS | SYSTOLIC BLOOD PRESSURE: 139 MMHG

## 2019-11-05 DIAGNOSIS — R42 LIGHT HEADEDNESS: ICD-10-CM

## 2019-11-05 DIAGNOSIS — R11.2 NAUSEA AND VOMITING, INTRACTABILITY OF VOMITING NOT SPECIFIED, UNSPECIFIED VOMITING TYPE: Primary | ICD-10-CM

## 2019-11-05 LAB
ALBUMIN SERPL-MCNC: 3.9 G/DL (ref 3.4–5)
ALBUMIN/GLOB SERPL: 0.8 {RATIO} (ref 0.8–1.7)
ALP SERPL-CCNC: 97 U/L (ref 45–117)
ALT SERPL-CCNC: 26 U/L (ref 13–56)
ANION GAP SERPL CALC-SCNC: 7 MMOL/L (ref 3–18)
APPEARANCE UR: CLEAR
AST SERPL-CCNC: 23 U/L (ref 10–38)
BACTERIA URNS QL MICRO: ABNORMAL /HPF
BASOPHILS # BLD: 0 K/UL (ref 0–0.1)
BASOPHILS NFR BLD: 0 % (ref 0–2)
BILIRUB SERPL-MCNC: 0.3 MG/DL (ref 0.2–1)
BILIRUB UR QL: NEGATIVE
BUN SERPL-MCNC: 22 MG/DL (ref 7–18)
BUN/CREAT SERPL: 29 (ref 12–20)
CALCIUM SERPL-MCNC: 10.5 MG/DL (ref 8.5–10.1)
CHLORIDE SERPL-SCNC: 100 MMOL/L (ref 100–111)
CO2 SERPL-SCNC: 31 MMOL/L (ref 21–32)
COLOR UR: YELLOW
CREAT SERPL-MCNC: 0.76 MG/DL (ref 0.6–1.3)
DIFFERENTIAL METHOD BLD: ABNORMAL
EOSINOPHIL # BLD: 0.1 K/UL (ref 0–0.4)
EOSINOPHIL NFR BLD: 1 % (ref 0–5)
EPITH CASTS URNS QL MICRO: ABNORMAL /LPF (ref 0–5)
ERYTHROCYTE [DISTWIDTH] IN BLOOD BY AUTOMATED COUNT: 14.8 % (ref 11.6–14.5)
GLOBULIN SER CALC-MCNC: 5 G/DL (ref 2–4)
GLUCOSE SERPL-MCNC: 111 MG/DL (ref 74–99)
GLUCOSE UR STRIP.AUTO-MCNC: NEGATIVE MG/DL
HCT VFR BLD AUTO: 39 % (ref 35–45)
HGB BLD-MCNC: 13.3 G/DL (ref 12–16)
HGB UR QL STRIP: NEGATIVE
KETONES UR QL STRIP.AUTO: NEGATIVE MG/DL
LEUKOCYTE ESTERASE UR QL STRIP.AUTO: ABNORMAL
LYMPHOCYTES # BLD: 2.1 K/UL (ref 0.9–3.6)
LYMPHOCYTES NFR BLD: 26 % (ref 21–52)
MCH RBC QN AUTO: 29.1 PG (ref 24–34)
MCHC RBC AUTO-ENTMCNC: 34.1 G/DL (ref 31–37)
MCV RBC AUTO: 85.3 FL (ref 74–97)
MONOCYTES # BLD: 0.9 K/UL (ref 0.05–1.2)
MONOCYTES NFR BLD: 11 % (ref 3–10)
MUCOUS THREADS URNS QL MICRO: ABNORMAL /LPF
NEUTS SEG # BLD: 4.9 K/UL (ref 1.8–8)
NEUTS SEG NFR BLD: 62 % (ref 40–73)
NITRITE UR QL STRIP.AUTO: NEGATIVE
PH UR STRIP: 5 [PH] (ref 5–8)
PLATELET # BLD AUTO: 209 K/UL (ref 135–420)
PMV BLD AUTO: 11.4 FL (ref 9.2–11.8)
POTASSIUM SERPL-SCNC: 3.5 MMOL/L (ref 3.5–5.5)
PROT SERPL-MCNC: 8.9 G/DL (ref 6.4–8.2)
PROT UR STRIP-MCNC: NEGATIVE MG/DL
RBC # BLD AUTO: 4.57 M/UL (ref 4.2–5.3)
RBC #/AREA URNS HPF: NEGATIVE /HPF (ref 0–5)
SODIUM SERPL-SCNC: 138 MMOL/L (ref 136–145)
SP GR UR REFRACTOMETRY: 1.02 (ref 1–1.03)
UROBILINOGEN UR QL STRIP.AUTO: 0.2 EU/DL (ref 0.2–1)
WBC # BLD AUTO: 8 K/UL (ref 4.6–13.2)
WBC URNS QL MICRO: ABNORMAL /HPF (ref 0–4)

## 2019-11-05 PROCEDURE — 74011250636 HC RX REV CODE- 250/636: Performed by: EMERGENCY MEDICINE

## 2019-11-05 PROCEDURE — 85025 COMPLETE CBC W/AUTO DIFF WBC: CPT

## 2019-11-05 PROCEDURE — 87086 URINE CULTURE/COLONY COUNT: CPT

## 2019-11-05 PROCEDURE — 99284 EMERGENCY DEPT VISIT MOD MDM: CPT

## 2019-11-05 PROCEDURE — 93005 ELECTROCARDIOGRAM TRACING: CPT

## 2019-11-05 PROCEDURE — 97110 THERAPEUTIC EXERCISES: CPT

## 2019-11-05 PROCEDURE — 80053 COMPREHEN METABOLIC PANEL: CPT

## 2019-11-05 PROCEDURE — 81001 URINALYSIS AUTO W/SCOPE: CPT

## 2019-11-05 PROCEDURE — 96374 THER/PROPH/DIAG INJ IV PUSH: CPT

## 2019-11-05 RX ORDER — METOCLOPRAMIDE HYDROCHLORIDE 5 MG/ML
10 INJECTION INTRAMUSCULAR; INTRAVENOUS
Status: COMPLETED | OUTPATIENT
Start: 2019-11-05 | End: 2019-11-05

## 2019-11-05 RX ADMIN — METOCLOPRAMIDE 10 MG: 5 INJECTION, SOLUTION INTRAMUSCULAR; INTRAVENOUS at 21:11

## 2019-11-05 NOTE — PROGRESS NOTES
PT DAILY TREATMENT NOTE 10-18    Patient Name: Mark Grider  Date:2019  : 1950  [x]  Patient  Verified  Payor: Rashawn Cox / Plan: [address] [place] [address] HMO / Product Type: Managed Care Medicare /    In time:225  Out time:315  Total Treatment Time (min): 50  Visit #: 5 of 10  Medicare/BCBS Only   Total Timed Codes (min):  40 1:1 Treatment Time:  30       Treatment Area: Pain in left knee [M25.562]  Unilateral primary osteoarthritis, left knee [M17.12]    SUBJECTIVE  Pain Level (0-10 scale): Any medication changes, allergies to medications, adverse drug reactions, diagnosis change, or new procedure performed?: [x] No    [] Yes (see summary sheet for update)  Subjective functional status/changes:   [] No changes reported  Pt states she was in the ER the other week for headache, was given potassium prescription and she hasn't been feeling well since. She feels dizzy, she called the doctor and talked to nurse. She stopped taking the potassiumyesterday. OBJECTIVE    Modality rationale: decrease inflammation and decrease pain to improve the patients ability to increase ease of ambulation.     Min Type Additional Details    - Estim:  -Unatt       -IFC  -Premod                        -Other:  -w/ice   -w/heat  Position:  Location:    - Estim: -Att    -TENS instruct  -NMES                    -Other:  -w/US   -w/ice   -w/heat  Position:  Location:    -  Traction: - Cervical       -Lumbar                       - Prone          -Supine                       -Intermittent   -Continuous Lbs:  - before manual  - after manual    []  Ultrasound: []Continuous   [] Pulsed                           []1MHz   []3MHz W/cm2:  Location:    []  Iontophoresis with dexamethasone         Location: [] Take home patch   [] In clinic   10 [x]  Ice     []  heat  []  Ice massage  []  Laser   []  Anodyne Position:   Location: left knee    []  Laser with stim  []  Other:  Position:  Location:    []  Vasopneumatic Device Pressure:       [] lo [] med [] hi   Temperature: [] lo [] med [] hi   [] Skin assessment post-treatment:  []intact []redness- no adverse reaction    []redness  adverse reaction:       40 min Therapeutic Exercise:  [x] See flow sheet :   Rationale: increase ROM and increase strength to improve the patients ability to increase ease of ambulation. With   [] TE   [] TA   [] neuro   [] other: Patient Education: [x] Review HEP    [] Progressed/Changed HEP based on:   [] positioning   [] body mechanics   [] transfers   [] heat/ice application    [] other:      Other Objective/Functional Measures:   /83   77 BPM  TTP along left ITB 2 minutes , educated on rolling out at home  Progressed to BTB supine hip abduction        Pain Level (0-10 scale) post treatment: 4/10    ASSESSMENT/Changes in Function: Patient with slight decrease in left knee pain today. She is TTP along left ITB, no significant change in pain after manual intervention. Progressing there-ex as able to tolerate. Patient will continue to benefit from skilled PT services to modify and progress therapeutic interventions, address ROM deficits, address strength deficits, analyze and address soft tissue restrictions and analyze and modify body mechanics/ergonomics to attain remaining goals. Progress towards goals / Updated goals:  Short Term Goals: To be accomplished in  2  treatments:  1. Pt will be compliant with HEP for symptom management at home.   Pt reports partial compliance with HEP 11/05  Long Term Goals: To be accomplished in  10  treatments:  1. Pt will demonstrate an increased FOTO score to TBA/100 in order to improve function. 2. Pt will be independent with HEP at D/C for self management.   3. Pt will be able to ambulate 250ft continuously without AD/LE buckling/LOB/rest break in order to return to community ambulation and her daily walking regimen  4. Pt will demonstrate increased left knee flexion and extension strength to > 4+/5 in order to assist with ambulation and transfers  5. Pt will demonstrate a proper squat and lift of 10lbs without valgus collapse in order to lift groceries without pain    PLAN  [x]  Upgrade activities as tolerated     [x]  Continue plan of care  []  Update interventions per flow sheet       []  Discharge due to:_  []  Other:_      Papo Ernandez, PT 11/5/2019  2:25 PM    Future Appointments   Date Time Provider Bharat Thakkar   11/5/2019  2:30 PM Gely Gonzáles, PT [id number] [facility]   11/7/2019  2:00 PM Gely Gonzáles, PT [id number] [facility]   11/12/2019  2:30 PM Gely Gonzáles PT [id number] [facility]   11/14/2019  2:00 PM Lee Ann Hernandez PTA [id number] [facility]   11/19/2019  2:00 PM Gely Gonzáles, PT [id number] [facility]   11/21/2019  2:00 PM Lee Ann Hernandez PTA [id number] [facility]   11/26/2019  2:30 PM Vicki Fisher, PT MMCPTPB [facility]

## 2019-11-06 LAB
ATRIAL RATE: 77 BPM
CALCULATED P AXIS, ECG09: 67 DEGREES
CALCULATED R AXIS, ECG10: 6 DEGREES
CALCULATED T AXIS, ECG11: 36 DEGREES
DIAGNOSIS, 93000: NORMAL
P-R INTERVAL, ECG05: 154 MS
Q-T INTERVAL, ECG07: 372 MS
QRS DURATION, ECG06: 86 MS
QTC CALCULATION (BEZET), ECG08: 420 MS
VENTRICULAR RATE, ECG03: 77 BPM

## 2019-11-06 NOTE — DISCHARGE INSTRUCTIONS
Patient Education        Lightheadedness or Faintness: Care Instructions  Your Care Instructions  Lightheadedness is a feeling that you are about to faint or \"pass out. \" You do not feel as if you or your surroundings are moving. It is different from vertigo, which is the feeling that you or things around you are spinning or tilting. Lightheadedness usually goes away or gets better when you lie down. If lightheadedness gets worse, it can lead to a fainting spell. It is common to feel lightheaded from time to time. Lightheadedness usually is not caused by a serious problem. It often is caused by a short-lasting drop in blood pressure and blood flow to your head that occurs when you get up too quickly from a seated or lying position. Follow-up care is a key part of your treatment and safety. Be sure to make and go to all appointments, and call your doctor if you are having problems. It's also a good idea to know your test results and keep a list of the medicines you take. How can you care for yourself at home? · Lie down for 1 or 2 minutes when you feel lightheaded. After lying down, sit up slowly and remain sitting for 1 to 2 minutes before slowly standing up. · Avoid movements, positions, or activities that have made you lightheaded in the past.  · Get plenty of rest, especially if you have a cold or flu, which can cause lightheadedness. · Make sure you drink plenty of fluids, especially if you have a fever or have been sweating. · Do not drive or put yourself and others in danger while you feel lightheaded. When should you call for help? Call 911 anytime you think you may need emergency care. For example, call if:    · You have symptoms of a stroke. These may include:  ? Sudden numbness, tingling, weakness, or loss of movement in your face, arm, or leg, especially on only one side of your body. ? Sudden vision changes. ? Sudden trouble speaking.   ? Sudden confusion or trouble understanding simple statements. ? Sudden problems with walking or balance. ? A sudden, severe headache that is different from past headaches.     · You have symptoms of a heart attack. These may include:  ? Chest pain or pressure, or a strange feeling in the chest.  ? Sweating. ? Shortness of breath. ? Nausea or vomiting. ? Pain, pressure, or a strange feeling in the back, neck, jaw, or upper belly or in one or both shoulders or arms. ? Lightheadedness or sudden weakness. ? A fast or irregular heartbeat. After you call 911, the  may tell you to chew 1 adult-strength or 2 to 4 low-dose aspirin. Wait for an ambulance. Do not try to drive yourself.    Watch closely for changes in your health, and be sure to contact your doctor if:    · Your lightheadedness gets worse or does not get better with home care. Where can you learn more? Go to http://briLiveHive Systemskhari.info/. Enter Y330 in the search box to learn more about \"Lightheadedness or Faintness: Care Instructions. \"  Current as of: June 26, 2019  Content Version: 12.2  © 2923-4967 [employer]. Care instructions adapted under license by [employer] (which disclaims liability or warranty for this information). If you have questions about a medical condition or this instruction, always ask your healthcare professional. [place] [address] any warranty or liability for your use of this information. Patient Education        Nausea and Vomiting: Care Instructions  Your Care Instructions    When you are nauseated, you may feel weak and sweaty and notice a lot of saliva in your mouth. Nausea often leads to vomiting. Most of the time you do not need to worry about nausea and vomiting, but they can be signs of other illnesses. Two common causes of nausea and vomiting are stomach flu and food poisoning. Nausea and vomiting from viral stomach flu will usually start to improve within 24 hours.  Nausea and vomiting from food poisoning may last from 12 to 48 hours. The doctor has checked you carefully, but problems can develop later. If you notice any problems or new symptoms, get medical treatment right away. Follow-up care is a key part of your treatment and safety. Be sure to make and go to all appointments, and call your doctor if you are having problems. It's also a good idea to know your test results and keep a list of the medicines you take. How can you care for yourself at home? · To prevent dehydration, drink plenty of fluids, enough so that your urine is light yellow or clear like water. Choose water and other caffeine-free clear liquids until you feel better. If you have kidney, heart, or liver disease and have to limit fluids, talk with your doctor before you increase the amount of fluids you drink. · Rest in bed until you feel better. · When you are able to eat, try clear soups, mild foods, and liquids until all symptoms are gone for 12 to 48 hours. Other good choices include dry toast, crackers, cooked cereal, and gelatin dessert, such as Jell-O. When should you call for help? Call 911 anytime you think you may need emergency care. For example, call if:    · You passed out (lost consciousness).    Call your doctor now or seek immediate medical care if:    · You have symptoms of dehydration, such as:  ? Dry eyes and a dry mouth. ? Passing only a little dark urine. ? Feeling thirstier than usual.     · You have new or worsening belly pain.     · You have a new or higher fever.     · You vomit blood or what looks like coffee grounds.    Watch closely for changes in your health, and be sure to contact your doctor if:    · You have ongoing nausea and vomiting.     · Your vomiting is getting worse.     · Your vomiting lasts longer than 2 days.     · You are not getting better as expected. Where can you learn more? Go to http://bri-khari.info/.   Enter 45 [id number] in the search box to learn more about \"Nausea and Vomiting: Care Instructions. \"  Current as of: June 26, 2019  Content Version: 12.2  © 8072-1390 [employer]. Care instructions adapted under license by [employer] (which disclaims liability or warranty for this information). If you have questions about a medical condition or this instruction, always ask your healthcare professional. [address] any warranty or liability for your use of this information.

## 2019-11-06 NOTE — ED TRIAGE NOTES
Pt states she has been taking potassium for hypokalemia and thinks it is making her dizzy, pt also c/o nausea

## 2019-11-06 NOTE — ED NOTES
I performed a brief history of the patient here in triage and I have determined that pt will need further treatment and evaluation from the main side ER physician. I have placed initial orders based on the history to help in expediting patients care. Visit Vitals  BP (!) 152/96 (BP 1 Location: Left arm, BP Patient Position: At rest)   Pulse 95   Temp 98.2 °F (36.8 °C)   Resp 16   Ht 5' 4\" (1.626 m)   Wt 74.8 kg (165 lb)   SpO2 97%   BMI 28.32 kg/m²       Recent history of hypokalemia taking potassium supplements patient feels her symptoms of nausea and dizziness are related to those supplements.     Peter Mccabe 153, PA  11/05/19

## 2019-11-06 NOTE — ED NOTES
I have reviewed discharge instructions with the patient. The patient verbalized understanding. Patient ambulated out of ER w/o distress.

## 2019-11-06 NOTE — ED PROVIDER NOTES
EMERGENCY DEPARTMENT HISTORY AND PHYSICAL EXAM    9:04 PM      Date: 11/5/2019  Patient Name: Chucky Edwards    History of Presenting Illness     Chief Complaint   Patient presents with    Nausea    Dizziness         History Provided By: Patient    Additional History (Context): Chucky Edwards is a 78 y.o. female with history of hypertension, hyperlipidemia who presents with complaint of 2 to 3 days of mild lightheadedness, nausea and upset stomach. She states that she was seen a week ago (seen in the ED on October 24), found incidentally to have hypokalemia, which was repleted in the emergency department, followed by a week prescription of potassium supplementation. She states she was told by her doctor to stop taking it 2 to 3 days ago when she started having the symptoms. She denies any chest pain, fever or any other associated symptoms. PCP: Paz Phillips NP    Current Outpatient Medications   Medication Sig Dispense Refill    LORazepam (ATIVAN) 0.5 mg tablet Take 1 Tab by mouth daily as needed for Anxiety. 4 Tab 0    sertraline (ZOLOFT) 100 mg tablet Take 100 mg by mouth daily.  butalbital-acetaminophen-caff (FIORICET) -40 mg per capsule Take 1 Cap by mouth every six (6) hours as needed for Pain. 15 Cap 0    diclofenac (VOLTAREN) 1 % gel Apply 4 g to affected area four (4) times daily. 100 g 0    hydroCHLOROthiazide (HYDRODIURIL) 25 mg tablet Take 25 mg by mouth daily.  amLODIPine (NORVASC) 10 mg tablet Take 10 mg by mouth daily.  aspirin 81 mg chewable tablet Take 81 mg by mouth daily. Past History     Past Medical History:  Past Medical History:   Diagnosis Date    Depression     High cholesterol     Hypertension        Past Surgical History:  History reviewed. No pertinent surgical history. Family History:  History reviewed. No pertinent family history.     Social History:  Social History     Tobacco Use    Smoking status: Never Smoker    Smokeless tobacco: Never Used   Substance Use Topics    Alcohol use: Not Currently    Drug use: Never       Allergies: Allergies   Allergen Reactions    Aleve [Naproxen Sodium] Unable to Obtain    Bactrim [Sulfamethoprim] Diarrhea    Diphenhydramine Drowsiness     Patient states \"It makes me sleepy\"    Feldene [Piroxicam] Unknown (comments)     Shaking      Penicillins Rash         Review of Systems       Review of Systems   Constitutional: Negative for activity change and appetite change. HENT: Negative for congestion. Eyes: Negative for visual disturbance. Respiratory: Negative for cough and shortness of breath. Cardiovascular: Negative for chest pain. Gastrointestinal: Positive for nausea. Negative for abdominal pain, diarrhea and vomiting. Genitourinary: Negative for dysuria. Musculoskeletal: Negative for arthralgias and myalgias. Skin: Negative for rash. Neurological: Positive for light-headedness. Negative for weakness and numbness. Physical Exam     Visit Vitals  /90   Pulse 89   Temp 98.2 °F (36.8 °C)   Resp 25   Ht 5' 4\" (1.626 m)   Wt 74.8 kg (165 lb)   SpO2 97%   BMI 28.32 kg/m²         Physical Exam   Constitutional: She is oriented to person, place, and time. She appears well-developed and well-nourished. HENT:   Head: Normocephalic and atraumatic. Mouth/Throat: Oropharynx is clear and moist.   Eyes: Conjunctivae are normal.   Neck: Normal range of motion. Neck supple. No JVD present. Cardiovascular: Normal rate, regular rhythm, normal heart sounds and intact distal pulses. No murmur heard. Pulmonary/Chest: Effort normal and breath sounds normal.   Abdominal: Soft. Bowel sounds are normal. She exhibits no distension. There is no tenderness. Musculoskeletal: Normal range of motion. She exhibits no deformity. Lymphadenopathy:     She has no cervical adenopathy. Neurological: She is alert and oriented to person, place, and time.  Coordination normal.   Skin: Skin is warm and dry. No rash noted. Psychiatric: She has a normal mood and affect. Nursing note and vitals reviewed. Diagnostic Study Results     Labs -  Recent Results (from the past 12 hour(s))   METABOLIC PANEL, COMPREHENSIVE    Collection Time: 11/05/19  8:43 PM   Result Value Ref Range    Sodium 138 136 - 145 mmol/L    Potassium 3.5 3.5 - 5.5 mmol/L    Chloride 100 100 - 111 mmol/L    CO2 31 21 - 32 mmol/L    Anion gap 7 3.0 - 18 mmol/L    Glucose 111 (H) 74 - 99 mg/dL    BUN 22 (H) 7.0 - 18 MG/DL    Creatinine 0.76 0.6 - 1.3 MG/DL    BUN/Creatinine ratio 29 (H) 12 - 20      GFR est AA >60 >60 ml/min/1.73m2    GFR est non-AA >60 >60 ml/min/1.73m2    Calcium 10.5 (H) 8.5 - 10.1 MG/DL    Bilirubin, total 0.3 0.2 - 1.0 MG/DL    ALT (SGPT) 26 13 - 56 U/L    AST (SGOT) 23 10 - 38 U/L    Alk. phosphatase 97 45 - 117 U/L    Protein, total 8.9 (H) 6.4 - 8.2 g/dL    Albumin 3.9 3.4 - 5.0 g/dL    Globulin 5.0 (H) 2.0 - 4.0 g/dL    A-G Ratio 0.8 0.8 - 1.7     CBC WITH AUTOMATED DIFF    Collection Time: 11/05/19  8:43 PM   Result Value Ref Range    WBC 8.0 4.6 - 13.2 K/uL    RBC 4.57 4.20 - 5.30 M/uL    HGB 13.3 12.0 - 16.0 g/dL    HCT 39.0 35.0 - 45.0 %    MCV 85.3 74.0 - 97.0 FL    MCH 29.1 24.0 - 34.0 PG    MCHC 34.1 31.0 - 37.0 g/dL    RDW 14.8 (H) 11.6 - 14.5 %    PLATELET 379 529 - 411 K/uL    MPV 11.4 9.2 - 11.8 FL    NEUTROPHILS 62 40 - 73 %    LYMPHOCYTES 26 21 - 52 %    MONOCYTES 11 (H) 3 - 10 %    EOSINOPHILS 1 0 - 5 %    BASOPHILS 0 0 - 2 %    ABS. NEUTROPHILS 4.9 1.8 - 8.0 K/UL    ABS. LYMPHOCYTES 2.1 0.9 - 3.6 K/UL    ABS. MONOCYTES 0.9 0.05 - 1.2 K/UL    ABS. EOSINOPHILS 0.1 0.0 - 0.4 K/UL    ABS.  BASOPHILS 0.0 0.0 - 0.1 K/UL    DF AUTOMATED     URINALYSIS W/ RFLX MICROSCOPIC    Collection Time: 11/05/19  9:00 PM   Result Value Ref Range    Color YELLOW      Appearance CLEAR      Specific gravity 1.023 1.005 - 1.030      pH (UA) 5.0 5.0 - 8.0      Protein NEGATIVE  NEG mg/dL    Glucose NEGATIVE  NEG mg/dL    Ketone NEGATIVE  NEG mg/dL    Bilirubin NEGATIVE  NEG      Blood NEGATIVE  NEG      Urobilinogen 0.2 0.2 - 1.0 EU/dL    Nitrites NEGATIVE  NEG      Leukocyte Esterase MODERATE (A) NEG     URINE MICROSCOPIC ONLY    Collection Time: 11/05/19  9:00 PM   Result Value Ref Range    WBC 5 to 10 0 - 4 /hpf    RBC NEGATIVE  0 - 5 /hpf    Epithelial cells 1+ 0 - 5 /lpf    Bacteria 1+ (A) NEG /hpf    Mucus 2+ (A) NEG /lpf       Radiologic Studies -   No orders to display         Medical Decision Making   I am the first provider for this patient. I reviewed the vital signs, available nursing notes, past medical history, past surgical history, family history and social history. Vital Signs-Reviewed the patient's vital signs. EKG:  Normal sinus rhythm, rate 77, , QTc 420. No acute ST or T wave changes, no STEMI. Records Reviewed: Nursing Notes and Old Medical Records (Time of Review: 9:04 PM)      Provider Notes (Medical Decision Making): Alberta Russell is a 78 y.o. female with history of hypertension, hyperlipidemia who presents with complaint of 2 to 3 days of mild lightheadedness, nausea and upset stomach. She states that she was seen a week ago (seen in the ED on October 24), found incidentally to have hypokalemia, which was repleted in the emergency department, followed by a week prescription of potassium supplementation. She appears well on exam.    Differential Diagnosis: Possible medication side effect, without other inciting factors. .  Given history and exam, I have low suspicion for MI, clinically significant aortic stenosis, hypertrophic cardiomyopathy, PE, aortic dissection, subarachnoid hemorrhage, TIA/stroke, GI bleed, AAA rupture. Arrhythmia is unlikely. ECG shows no interval abnormalities such as QT prolongation or WPW, and there are no findings to suggest Brugada syndrome. Cardiac monitoring reveals no tachycardic or bradycardic dysrhythmia.  There are no clear historical elements pointing to hypertrophic cardiomyopathy (QRS voltage is not extremely high and there are no suggestive Q waves). [place] Syncope Score: 0    Testing: CBC, CMP, urinalysis  Treatments: Reglan    Re-evaluations:  Studies are unremarkable, she feels improved after the Reglan, is currently asymptomatic. Stable and appropriate for discharge. She has follow-up scheduled with her primary physician on Monday. The patient will be discharged home. Findings were discussed at length and questions were answered. Information on all newly prescribed medications was given. the patient was instructed to follow-up with her primary physician, or return to the Emergency Department with any worsened symptoms or concerns. Return precautions were given. Diagnosis     Clinical Impression:   1. Nausea and vomiting, intractability of vomiting not specified, unspecified vomiting type    2. Light headedness        Disposition: Discharge    Follow-up Information     Follow up With Specialties Details Why Contact Info    Amaya Drew NP Nurse Practitioner Schedule an appointment as soon as possible for a visit  [address] [facility] [address] [phone]      [facility] EMERGENCY DEPT Emergency Medicine  If symptoms worsen [address] [place] [id number] [phone]           Patient's Medications   Start Taking    No medications on file   Continue Taking    AMLODIPINE (NORVASC) 10 MG TABLET    Take 10 mg by mouth daily. ASPIRIN 81 MG CHEWABLE TABLET    Take 81 mg by mouth daily. BUTALBITAL-ACETAMINOPHEN-CAFF (FIORICET) -40 MG PER CAPSULE    Take 1 Cap by mouth every six (6) hours as needed for Pain. DICLOFENAC (VOLTAREN) 1 % GEL    Apply 4 g to affected area four (4) times daily. HYDROCHLOROTHIAZIDE (HYDRODIURIL) 25 MG TABLET    Take 25 mg by mouth daily. LORAZEPAM (ATIVAN) 0.5 MG TABLET    Take 1 Tab by mouth daily as needed for Anxiety.     SERTRALINE (ZOLOFT) 100 MG TABLET    Take 100 mg by mouth daily. These Medications have changed    No medications on file   Stop Taking    No medications on file     _______________________________    Attestations:  Yelena Garcia MD acting as a scribe for and in the presence of Latoya Monson MD      November 05, 2019 at 10:29 PM       Provider Attestation:      I personally performed the services described in the documentation, reviewed the documentation, as recorded by the scribe in my presence, and it accurately and completely records my words and actions.  November 05, 2019 at 10:29 PM - Latoya Monson MD    _______________________________

## 2019-11-07 ENCOUNTER — HOSPITAL ENCOUNTER (OUTPATIENT)
Dept: PHYSICAL THERAPY | Age: 69
Discharge: HOME | End: 2019-11-07
Payer: MEDICARE

## 2019-11-07 ENCOUNTER — APPOINTMENT (OUTPATIENT)
Dept: PHYSICAL THERAPY | Age: 69
End: 2019-11-07
Payer: MEDICARE

## 2019-11-07 LAB
BACTERIA SPEC CULT: NORMAL
SERVICE CMNT-IMP: NORMAL

## 2019-11-07 PROCEDURE — 97110 THERAPEUTIC EXERCISES: CPT

## 2019-11-07 NOTE — PROGRESS NOTES
PT DAILY TREATMENT NOTE 10-18    Patient Name: Abilio Forman  Date:2019  : 1950  [x]  Patient  Verified  Payor: Destiny Clement / Plan: [address] [place] [address] HMO / Product Type: Managed Care Medicare /    In time: 230  Out time: 305  Total Treatment Time (min): 35  Visit #: 6 of 16      Medicare/BCBS Only   Total Timed Codes (min):  30 1:1 Treatment Time:  30       Treatment Area: Pain in left knee [M25.562]  Unilateral primary osteoarthritis, left knee [M17.12]    SUBJECTIVE  Pain Level (0-10 scale): Any medication changes, allergies to medications, adverse drug reactions, diagnosis change, or new procedure performed?: [x] No    [] Yes (see summary sheet for update)  Subjective functional status/changes:   [] No changes reported  Patient states she continues to have mild dizziness, she is following up with MD on Monday. OBJECTIVE    30 min Therapeutic Exercise:  [x] See flow sheet :   Rationale: increase ROM and increase strength to improve the patients ability to increase ease of ambulation. With   [] TE   [] TA   [] neuro   [] other: Patient Education: [x] Review HEP    [] Progressed/Changed HEP based on:   [] positioning   [] body mechanics   [] transfers   [] heat/ice application    [] other:      Other Objective/Functional Measures:    minus 5 minutes pt in restroom   Progressed reps and resistance per flow sheet  Pt reports fatigue following SAQ  No increase in pain reported during session  Pt states the ice feels good, although reports no change in pain level from start to finish of therapy session       Pain Level (0-10 scale) post treatment: 4/10    ASSESSMENT/Changes in Function: Patient is slowly progressing. Left knee strength improving evident by progression of there-ex. Will continue to address strength and trial SPC in clinic to restore ability to ambulate with LRAD.      Patient will continue to benefit from skilled PT services to modify and progress therapeutic interventions, address ROM deficits, address strength deficits, analyze and address soft tissue restrictions and analyze and modify body mechanics/ergonomics to attain remaining goals. Progress towards goals / Updated goals:  Short Term Goals: To be accomplished in  2  treatments:  1. Pt will be compliant with HEP for symptom management at home.   Pt reports partial compliance with HEP 11/05  Long Term Goals: To be accomplished in  10  treatments:  1. Pt will demonstrate an increased FOTO score to TBA/100 in order to improve function. 2. Pt will be independent with HEP at D/C for self management.   3. Pt will be able to ambulate 250ft continuously without AD/LE buckling/LOB/rest break in order to return to community ambulation and her daily walking regimen  4. Pt will demonstrate increased left knee flexion and extension strength to > 4+/5 in order to assist with ambulation and transfers  5. Pt will demonstrate a proper squat and lift of 10lbs without valgus collapse in order to lift groceries without pain    PLAN  [x]  Upgrade activities as tolerated     [x]  Continue plan of care  []  Update interventions per flow sheet       []  Discharge due to:_  []  Other:_      Carlos Johnson, TREVA 11/7/2019  2:25 PM    Future Appointments   Date Time Provider Bharat Thakkar   11/12/2019  2:30 PM Chicho Holman, PT [id number]   11/14/2019  2:00 PM Onita Null, PTA [id number]   11/19/2019  2:00 PM Chicho Holman, PT [facility]   11/21/2019  2:00 PM Onita Null, PTA [facility]   11/26/2019  2:30 PM Cassandra Neri, PT [facility]

## 2019-11-12 ENCOUNTER — APPOINTMENT (OUTPATIENT)
Dept: PHYSICAL THERAPY | Age: 69
End: 2019-11-12
Payer: MEDICARE

## 2019-11-14 ENCOUNTER — APPOINTMENT (OUTPATIENT)
Dept: PHYSICAL THERAPY | Age: 69
End: 2019-11-14
Payer: MEDICARE

## 2019-11-14 ENCOUNTER — HOSPITAL ENCOUNTER (EMERGENCY)
Age: 69
Discharge: HOME | End: 2019-11-14
Attending: EMERGENCY MEDICINE
Payer: MEDICARE

## 2019-11-14 VITALS
TEMPERATURE: 98 F | OXYGEN SATURATION: 100 % | HEIGHT: 64 IN | DIASTOLIC BLOOD PRESSURE: 89 MMHG | RESPIRATION RATE: 18 BRPM | WEIGHT: 155 LBS | SYSTOLIC BLOOD PRESSURE: 150 MMHG | BODY MASS INDEX: 26.46 KG/M2 | HEART RATE: 80 BPM

## 2019-11-14 DIAGNOSIS — E87.6 HYPOKALEMIA: ICD-10-CM

## 2019-11-14 DIAGNOSIS — R10.13 ABDOMINAL PAIN, EPIGASTRIC: Primary | ICD-10-CM

## 2019-11-14 DIAGNOSIS — N30.00 ACUTE CYSTITIS WITHOUT HEMATURIA: ICD-10-CM

## 2019-11-14 DIAGNOSIS — K64.9 HEMORRHOIDS, UNSPECIFIED HEMORRHOID TYPE: ICD-10-CM

## 2019-11-14 LAB
ANION GAP SERPL CALC-SCNC: 6 MMOL/L (ref 3–18)
APPEARANCE UR: ABNORMAL
ATRIAL RATE: 86 BPM
BACTERIA URNS QL MICRO: ABNORMAL /HPF
BASOPHILS # BLD: 0 K/UL (ref 0–0.1)
BASOPHILS NFR BLD: 0 % (ref 0–2)
BILIRUB UR QL: NEGATIVE
BUN SERPL-MCNC: 10 MG/DL (ref 7–18)
BUN/CREAT SERPL: 14 (ref 12–20)
CALCIUM SERPL-MCNC: 9.6 MG/DL (ref 8.5–10.1)
CALCULATED P AXIS, ECG09: 56 DEGREES
CALCULATED R AXIS, ECG10: 24 DEGREES
CALCULATED T AXIS, ECG11: 53 DEGREES
CHLORIDE SERPL-SCNC: 98 MMOL/L (ref 100–111)
CO2 SERPL-SCNC: 33 MMOL/L (ref 21–32)
COLOR UR: YELLOW
CREAT SERPL-MCNC: 0.72 MG/DL (ref 0.6–1.3)
DIAGNOSIS, 93000: NORMAL
DIFFERENTIAL METHOD BLD: ABNORMAL
EOSINOPHIL # BLD: 0 K/UL (ref 0–0.4)
EOSINOPHIL NFR BLD: 0 % (ref 0–5)
EPITH CASTS URNS QL MICRO: ABNORMAL /LPF (ref 0–5)
ERYTHROCYTE [DISTWIDTH] IN BLOOD BY AUTOMATED COUNT: 14.4 % (ref 11.6–14.5)
GLUCOSE SERPL-MCNC: 137 MG/DL (ref 74–99)
GLUCOSE UR STRIP.AUTO-MCNC: NEGATIVE MG/DL
HCT VFR BLD AUTO: 37.9 % (ref 35–45)
HEMOCCULT STL QL: POSITIVE
HGB BLD-MCNC: 13.2 G/DL (ref 12–16)
HGB UR QL STRIP: NEGATIVE
KETONES UR QL STRIP.AUTO: ABNORMAL MG/DL
LEUKOCYTE ESTERASE UR QL STRIP.AUTO: ABNORMAL
LYMPHOCYTES # BLD: 1.5 K/UL (ref 0.9–3.6)
LYMPHOCYTES NFR BLD: 13 % (ref 21–52)
MAGNESIUM SERPL-MCNC: 1.8 MG/DL (ref 1.6–2.6)
MCH RBC QN AUTO: 29.8 PG (ref 24–34)
MCHC RBC AUTO-ENTMCNC: 34.8 G/DL (ref 31–37)
MCV RBC AUTO: 85.6 FL (ref 74–97)
MONOCYTES # BLD: 0.9 K/UL (ref 0.05–1.2)
MONOCYTES NFR BLD: 8 % (ref 3–10)
MUCOUS THREADS URNS QL MICRO: ABNORMAL /LPF
NEUTS SEG # BLD: 9.1 K/UL (ref 1.8–8)
NEUTS SEG NFR BLD: 79 % (ref 40–73)
NITRITE UR QL STRIP.AUTO: NEGATIVE
P-R INTERVAL, ECG05: 144 MS
PH UR STRIP: 6 [PH] (ref 5–8)
PLATELET # BLD AUTO: 223 K/UL (ref 135–420)
PMV BLD AUTO: 11.8 FL (ref 9.2–11.8)
POTASSIUM SERPL-SCNC: 2.8 MMOL/L (ref 3.5–5.5)
PROT UR STRIP-MCNC: ABNORMAL MG/DL
Q-T INTERVAL, ECG07: 384 MS
QRS DURATION, ECG06: 94 MS
QTC CALCULATION (BEZET), ECG08: 459 MS
RBC # BLD AUTO: 4.43 M/UL (ref 4.2–5.3)
RBC #/AREA URNS HPF: NEGATIVE /HPF (ref 0–5)
SODIUM SERPL-SCNC: 137 MMOL/L (ref 136–145)
SP GR UR REFRACTOMETRY: 1.02 (ref 1–1.03)
UROBILINOGEN UR QL STRIP.AUTO: 1 EU/DL (ref 0.2–1)
VENTRICULAR RATE, ECG03: 86 BPM
WBC # BLD AUTO: 11.5 K/UL (ref 4.6–13.2)
WBC URNS QL MICRO: ABNORMAL /HPF (ref 0–4)

## 2019-11-14 PROCEDURE — 81001 URINALYSIS AUTO W/SCOPE: CPT

## 2019-11-14 PROCEDURE — 85025 COMPLETE CBC W/AUTO DIFF WBC: CPT

## 2019-11-14 PROCEDURE — 94762 N-INVAS EAR/PLS OXIMTRY CONT: CPT

## 2019-11-14 PROCEDURE — 82272 OCCULT BLD FECES 1-3 TESTS: CPT

## 2019-11-14 PROCEDURE — 80048 BASIC METABOLIC PNL TOTAL CA: CPT

## 2019-11-14 PROCEDURE — 99283 EMERGENCY DEPT VISIT LOW MDM: CPT

## 2019-11-14 PROCEDURE — 93005 ELECTROCARDIOGRAM TRACING: CPT

## 2019-11-14 PROCEDURE — 74011000250 HC RX REV CODE- 250: Performed by: STUDENT IN AN ORGANIZED HEALTH CARE EDUCATION/TRAINING PROGRAM

## 2019-11-14 PROCEDURE — 74011250636 HC RX REV CODE- 250/636: Performed by: STUDENT IN AN ORGANIZED HEALTH CARE EDUCATION/TRAINING PROGRAM

## 2019-11-14 PROCEDURE — 83735 ASSAY OF MAGNESIUM: CPT

## 2019-11-14 PROCEDURE — 74011250637 HC RX REV CODE- 250/637: Performed by: STUDENT IN AN ORGANIZED HEALTH CARE EDUCATION/TRAINING PROGRAM

## 2019-11-14 PROCEDURE — 74011000258 HC RX REV CODE- 258: Performed by: STUDENT IN AN ORGANIZED HEALTH CARE EDUCATION/TRAINING PROGRAM

## 2019-11-14 PROCEDURE — 96375 TX/PRO/DX INJ NEW DRUG ADDON: CPT

## 2019-11-14 PROCEDURE — 96365 THER/PROPH/DIAG IV INF INIT: CPT

## 2019-11-14 RX ORDER — NITROFURANTOIN MACROCRYSTALS 50 MG/1
50 CAPSULE ORAL EVERY 6 HOURS
Status: DISCONTINUED | OUTPATIENT
Start: 2019-11-14 | End: 2019-11-14

## 2019-11-14 RX ORDER — ACETAMINOPHEN 500 MG
1000 TABLET ORAL
Status: COMPLETED | OUTPATIENT
Start: 2019-11-14 | End: 2019-11-14

## 2019-11-14 RX ORDER — POTASSIUM CHLORIDE 20 MEQ/1
40 TABLET, EXTENDED RELEASE ORAL
Status: COMPLETED | OUTPATIENT
Start: 2019-11-14 | End: 2019-11-14

## 2019-11-14 RX ORDER — ONDANSETRON 2 MG/ML
4 INJECTION INTRAMUSCULAR; INTRAVENOUS
Status: COMPLETED | OUTPATIENT
Start: 2019-11-14 | End: 2019-11-14

## 2019-11-14 RX ORDER — HYDROCORTISONE 25 MG/G
CREAM TOPICAL 4 TIMES DAILY
Qty: 30 G | Refills: 0 | Status: SHIPPED | OUTPATIENT
Start: 2019-11-14 | End: 2020-01-17

## 2019-11-14 RX ORDER — NITROFURANTOIN 25; 75 MG/1; MG/1
100 CAPSULE ORAL 2 TIMES DAILY
Qty: 6 CAP | Refills: 0 | Status: SHIPPED | OUTPATIENT
Start: 2019-11-14 | End: 2019-11-17

## 2019-11-14 RX ORDER — ONDANSETRON 4 MG/1
4 TABLET, ORALLY DISINTEGRATING ORAL
Qty: 30 TAB | Refills: 0 | OUTPATIENT
Start: 2019-11-14 | End: 2019-11-23

## 2019-11-14 RX ORDER — DICYCLOMINE HYDROCHLORIDE 20 MG/1
20 TABLET ORAL EVERY 6 HOURS
Qty: 20 TAB | Refills: 0 | Status: SHIPPED | OUTPATIENT
Start: 2019-11-14 | End: 2019-11-19

## 2019-11-14 RX ADMIN — ACETAMINOPHEN 1000 MG: 500 TABLET ORAL at 12:20

## 2019-11-14 RX ADMIN — ONDANSETRON 4 MG: 2 INJECTION INTRAMUSCULAR; INTRAVENOUS at 11:39

## 2019-11-14 RX ADMIN — POTASSIUM CHLORIDE: 2 INJECTION, SOLUTION, CONCENTRATE INTRAVENOUS at 12:51

## 2019-11-14 RX ADMIN — POTASSIUM CHLORIDE 40 MEQ: 20 TABLET, EXTENDED RELEASE ORAL at 13:05

## 2019-11-14 NOTE — ED PROVIDER NOTES
EMERGENCY DEPARTMENT HISTORY AND PHYSICAL EXAM    11:16 AM      Date: 11/14/2019  Patient Name: Eliana Vargas    History of Presenting Illness     Chief Complaint   Patient presents with    Vomiting         History Provided By: Patient  Eliana Vargas is a 78year old female with PMH of HTN and HLD who presents with nausea, vomiting and abdominal pain. Patient reports epigastric cramping abdominal pain started yesterday night with one episode of NBNB vomiting and bowel movement. Patient endorses chills, BRBPR and in toilet bowel with bowel movement today. Denies NSAID use, fever, congestion, cough, SOB and chest pain. Patient was seen in October for hypokalemia and was started on potassium for a week but she developed lightheadedness and had to stop taking that medication. Of note, discussed case with nursing. Patient does not have heating in home, which was discovered on last ED visit in 11/4. Suspects social visit today. PCP: Mick Hu NP    Current Outpatient Medications   Medication Sig Dispense Refill    hydrocortisone (ANUSOL-HC) 2.5 % rectal cream Insert  into rectum four (4) times daily. 30 g 0    ondansetron (ZOFRAN ODT) 4 mg disintegrating tablet Take 1 Tab by mouth every eight (8) hours as needed for Nausea. 30 Tab 0    nitrofurantoin, macrocrystal-monohydrate, (MACROBID) 100 mg capsule Take 1 Cap by mouth two (2) times a day for 3 days. 6 Cap 0    dicyclomine (BENTYL) 20 mg tablet Take 1 Tab by mouth every six (6) hours for 20 doses. 20 Tab 0    LORazepam (ATIVAN) 0.5 mg tablet Take 1 Tab by mouth daily as needed for Anxiety. 4 Tab 0    sertraline (ZOLOFT) 100 mg tablet Take 100 mg by mouth daily.  butalbital-acetaminophen-caff (FIORICET) -40 mg per capsule Take 1 Cap by mouth every six (6) hours as needed for Pain. 15 Cap 0    diclofenac (VOLTAREN) 1 % gel Apply 4 g to affected area four (4) times daily.  100 g 0    hydroCHLOROthiazide (HYDRODIURIL) 25 mg tablet Take 25 mg by mouth daily.  amLODIPine (NORVASC) 10 mg tablet Take 10 mg by mouth daily.  aspirin 81 mg chewable tablet Take 81 mg by mouth daily. Past History     Past Medical History:  Past Medical History:   Diagnosis Date    Depression     High cholesterol     Hypertension        Past Surgical History:  History reviewed. No pertinent surgical history. Family History:  History reviewed. No pertinent family history. Social History:  Social History     Tobacco Use    Smoking status: Never Smoker    Smokeless tobacco: Never Used   Substance Use Topics    Alcohol use: Not Currently    Drug use: Never       Allergies: Allergies   Allergen Reactions    Aleve [Naproxen Sodium] Unable to Obtain    Bactrim [Sulfamethoprim] Diarrhea    Diphenhydramine Drowsiness     Patient states \"It makes me sleepy\"    Feldene [Piroxicam] Unknown (comments)     Shaking      Penicillins Rash         Review of Systems     Review of Systems   Constitutional: Positive for chills. Gastrointestinal: Positive for abdominal pain, anal bleeding, nausea and vomiting. Genitourinary: Positive for frequency. Negative for dysuria, flank pain and hematuria. Neurological: Positive for light-headedness. Physical Exam     Visit Vitals  /89 (BP 1 Location: Left arm, BP Patient Position: At rest)   Pulse 80   Temp 98 °F (36.7 °C)   Resp 18   Ht 5' 4\" (1.626 m)   Wt 70.3 kg (155 lb)   SpO2 100%   BMI 26.61 kg/m²       Physical Exam   Constitutional: She is oriented to person, place, and time. She appears well-developed and well-nourished. HENT:   Head: Normocephalic and atraumatic. Mouth/Throat: Oropharynx is clear and moist.   Eyes: Conjunctivae and EOM are normal.   Neck: Normal range of motion. Neck supple. No tracheal deviation present. Cardiovascular: Normal rate, regular rhythm and intact distal pulses. Pulmonary/Chest: Effort normal and breath sounds normal.   Abdominal: Soft.  Bowel sounds are normal. She exhibits no distension. There is tenderness (mild epigastric). Genitourinary: Rectal exam shows guaiac positive stool. Genitourinary Comments: External hemorrhoid at 12 o'clock   Musculoskeletal: Normal range of motion. She exhibits no edema. Neurological: She is alert and oriented to person, place, and time. Skin: Skin is warm and dry. Diagnostic Study Results     Labs -  Recent Results (from the past 12 hour(s))   METABOLIC PANEL, BASIC    Collection Time: 11/14/19  8:25 AM   Result Value Ref Range    Sodium 137 136 - 145 mmol/L    Potassium 2.8 (LL) 3.5 - 5.5 mmol/L    Chloride 98 (L) 100 - 111 mmol/L    CO2 33 (H) 21 - 32 mmol/L    Anion gap 6 3.0 - 18 mmol/L    Glucose 137 (H) 74 - 99 mg/dL    BUN 10 7.0 - 18 MG/DL    Creatinine 0.72 0.6 - 1.3 MG/DL    BUN/Creatinine ratio 14 12 - 20      GFR est AA >60 >60 ml/min/1.73m2    GFR est non-AA >60 >60 ml/min/1.73m2    Calcium 9.6 8.5 - 10.1 MG/DL   CBC WITH AUTOMATED DIFF    Collection Time: 11/14/19  8:25 AM   Result Value Ref Range    WBC 11.5 4.6 - 13.2 K/uL    RBC 4.43 4.20 - 5.30 M/uL    HGB 13.2 12.0 - 16.0 g/dL    HCT 37.9 35.0 - 45.0 %    MCV 85.6 74.0 - 97.0 FL    MCH 29.8 24.0 - 34.0 PG    MCHC 34.8 31.0 - 37.0 g/dL    RDW 14.4 11.6 - 14.5 %    PLATELET 287 372 - 149 K/uL    MPV 11.8 9.2 - 11.8 FL    NEUTROPHILS 79 (H) 40 - 73 %    LYMPHOCYTES 13 (L) 21 - 52 %    MONOCYTES 8 3 - 10 %    EOSINOPHILS 0 0 - 5 %    BASOPHILS 0 0 - 2 %    ABS. NEUTROPHILS 9.1 (H) 1.8 - 8.0 K/UL    ABS. LYMPHOCYTES 1.5 0.9 - 3.6 K/UL    ABS. MONOCYTES 0.9 0.05 - 1.2 K/UL    ABS. EOSINOPHILS 0.0 0.0 - 0.4 K/UL    ABS.  BASOPHILS 0.0 0.0 - 0.1 K/UL    DF AUTOMATED     MAGNESIUM    Collection Time: 11/14/19  8:25 AM   Result Value Ref Range    Magnesium 1.8 1.6 - 2.6 mg/dL   OCCULT BLOOD, STOOL    Collection Time: 11/14/19 11:43 AM   Result Value Ref Range    Occult blood, stool POSITIVE (A) NEG     EKG, 12 LEAD, INITIAL    Collection Time: 11/14/19 11:46 AM   Result Value Ref Range    Ventricular Rate 86 BPM    Atrial Rate 86 BPM    P-R Interval 144 ms    QRS Duration 94 ms    Q-T Interval 384 ms    QTC Calculation (Bezet) 459 ms    Calculated P Axis 56 degrees    Calculated R Axis 24 degrees    Calculated T Axis 53 degrees    Diagnosis       Normal sinus rhythm  Possible Left atrial enlargement  Nonspecific T wave abnormality  Abnormal ECG  When compared with ECG of 05-NOV-2019 20:54,  No significant change was found     URINALYSIS W/ RFLX MICROSCOPIC    Collection Time: 11/14/19  1:01 PM   Result Value Ref Range    Color YELLOW      Appearance CLOUDY      Specific gravity 1.024 1.005 - 1.030      pH (UA) 6.0 5.0 - 8.0      Protein TRACE (A) NEG mg/dL    Glucose NEGATIVE  NEG mg/dL    Ketone TRACE (A) NEG mg/dL    Bilirubin NEGATIVE  NEG      Blood NEGATIVE  NEG      Urobilinogen 1.0 0.2 - 1.0 EU/dL    Nitrites NEGATIVE  NEG      Leukocyte Esterase MODERATE (A) NEG     URINE MICROSCOPIC ONLY    Collection Time: 11/14/19  1:01 PM   Result Value Ref Range    WBC 20 to 25 0 - 4 /hpf    RBC NEGATIVE  0 - 5 /hpf    Epithelial cells 2+ 0 - 5 /lpf    Bacteria 2+ (A) NEG /hpf    Mucus 3+ (A) NEG /lpf       Radiologic Studies -   No orders to display         Medical Decision Making   I am the first provider for this patient. I reviewed the vital signs, available nursing notes, past medical history, past surgical history, family history and social history. Vital Signs-Reviewed the patient's vital signs. EKG: NSR     Records Reviewed: Nursing Notes, Old Medical Records and Previous electrocardiograms (Time of Review: 11:16 AM)    ED Course: Progress Notes, Reevaluation, and Consults:    Provider Notes (Medical Decision Making): Hypokalemia, Hemorrhoid, UTI  DDX: Diuretic use, Hypokalemia, Gastroenteritis,  Diverticulitis, malignancy  MDM   78year old female with PMH of HTN and HLD who presents with nausea, vomiting and abdominal pain.  Patient had acute onset of nausea, vomiting and abdominal pain. Suspect gastroenteritis vs cramping from hypokalemia, given history and exam. Hypokalemia may be due to diuretic use, vomiting or hypothermia (cold home). EKG showed NSR. Suspect blood in stool is due to hemorrhoids, however given age, recommend GI follow up for further evaluation. 11:57 am Given zofran 4 mg. Patient deferred potassium 10 mEq IV until she was able to speak with her friend and PCP Dr Ophelia Bliss.    1:01 pm Patient agreed to take Potassium 10 mEq IV and 40 mEq PO. Obtained UA given patient's history of urinary frequency. Testing: CBC, BMP, hemoccult, EKG  Plan: Recommend outpatient PCP and GI follow up for blood in stool, Given prescription for zofran for nausea (did not give due to patient having previous prescription), bently for abdominal pain, macrobid for UTI and Anusol for hemorrhoid    Discussed results and plan with patient who expressed understanding and agreement. Patient stable upon discharge. Recommend GI and PCP outpatient follow up and return to ED if symptoms worsen. Diagnosis     Clinical Impression:   1. Abdominal pain, epigastric    2. Hemorrhoids, unspecified hemorrhoid type    3. Hypokalemia    4. Acute cystitis without hematuria        Disposition: Home    Follow-up Information     Follow up With Specialties Details Why Contact Info    Anel Weiss NP Nurse Practitioner Schedule an appointment as soon as possible for a visit  [address] [facility] [address] [phone]      [facility] EMERGENCY DEPT Emergency Medicine  If symptoms worsen [address] [place] [address]    Em Paiz MD Gastroenterology Schedule an appointment as soon as possible for a visit for hospital follow up [phone]             Patient's Medications   Start Taking    DICYCLOMINE (BENTYL) 20 MG TABLET    Take 1 Tab by mouth every six (6) hours for 20 doses.     HYDROCORTISONE (ANUSOL-HC) 2.5 % RECTAL CREAM    Insert  into rectum four (4) times daily. NITROFURANTOIN, MACROCRYSTAL-MONOHYDRATE, (MACROBID) 100 MG CAPSULE    Take 1 Cap by mouth two (2) times a day for 3 days. ONDANSETRON (ZOFRAN ODT) 4 MG DISINTEGRATING TABLET    Take 1 Tab by mouth every eight (8) hours as needed for Nausea. Continue Taking    AMLODIPINE (NORVASC) 10 MG TABLET    Take 10 mg by mouth daily. ASPIRIN 81 MG CHEWABLE TABLET    Take 81 mg by mouth daily. BUTALBITAL-ACETAMINOPHEN-CAFF (FIORICET) -40 MG PER CAPSULE    Take 1 Cap by mouth every six (6) hours as needed for Pain. DICLOFENAC (VOLTAREN) 1 % GEL    Apply 4 g to affected area four (4) times daily. HYDROCHLOROTHIAZIDE (HYDRODIURIL) 25 MG TABLET    Take 25 mg by mouth daily. LORAZEPAM (ATIVAN) 0.5 MG TABLET    Take 1 Tab by mouth daily as needed for Anxiety. SERTRALINE (ZOLOFT) 100 MG TABLET    Take 100 mg by mouth daily. These Medications have changed    No medications on file   Stop Taking    No medications on file     Disclaimer: Sections of this note are dictated using utilizing voice recognition software. Minor typographical errors may be present. If questions arise, please do not hesitate to contact me or call our department.       MD Hadley Rubin  PGY-2  11/14/19

## 2019-11-14 NOTE — DISCHARGE INSTRUCTIONS
Patient Education        Hemorrhoids: Care Instructions  Your Care Instructions    Hemorrhoids are enlarged veins that develop in the anal canal. Bleeding during bowel movements, itching, swelling, and rectal pain are the most common symptoms. They can be uncomfortable at times, but hemorrhoids rarely are a serious problem. You can treat most hemorrhoids with simple changes to your diet and bowel habits. These changes include eating more fiber and not straining to pass stools. Most hemorrhoids do not need surgery or other treatment unless they are very large and painful or bleed a lot. Follow-up care is a key part of your treatment and safety. Be sure to make and go to all appointments, and call your doctor if you are having problems. It's also a good idea to know your test results and keep a list of the medicines you take. How can you care for yourself at home? · Sit in a few inches of warm water (sitz bath) 3 times a day and after bowel movements. The warm water helps with pain and itching. · Put ice on your anal area several times a day for 10 minutes at a time. Put a thin cloth between the ice and your skin. Follow this by placing a warm, wet towel on the area for another 10 to 20 minutes. · Take pain medicines exactly as directed. ? If the doctor gave you a prescription medicine for pain, take it as prescribed. ? If you are not taking a prescription pain medicine, ask your doctor if you can take an over-the-counter medicine. · Keep the anal area clean, but be gentle. Use water and a fragrance-free soap, such as [place], or use baby wipes or medicated pads, such as Tucks. · Wear cotton underwear and loose clothing to decrease moisture in the anal area. · Eat more fiber. Include foods such as whole-grain breads and cereals, raw vegetables, raw and dried fruits, and beans. · Drink plenty of fluids, enough so that your urine is light yellow or clear like water.  If you have kidney, heart, or liver disease and have to limit fluids, talk with your doctor before you increase the amount of fluids you drink. · Use a stool softener that contains bran or psyllium. You can save money by buying bran or psyllium (available in bulk at most health food stores) and sprinkling it on foods or stirring it into fruit juice. Or you can use a product such as Metamucil or Hydrocil. · Practice healthy bowel habits. ? Go to the bathroom as soon as you have the urge. ? Avoid straining to pass stools. Relax and give yourself time to let things happen naturally. ? Do not hold your breath while passing stools. ? Do not read while sitting on the toilet. Get off the toilet as soon as you have finished. · Take your medicines exactly as prescribed. Call your doctor if you think you are having a problem with your medicine. When should you call for help? Call 911 anytime you think you may need emergency care. For example, call if:    · You pass maroon or very bloody stools.    Call your doctor now or seek immediate medical care if:    · You have increased pain.     · You have increased bleeding.    Watch closely for changes in your health, and be sure to contact your doctor if:    · Your symptoms have not improved after 3 or 4 days. Where can you learn more? Go to http://bri-khari.info/. Enter F228 in the search box to learn more about \"Hemorrhoids: Care Instructions. \"  Current as of: November 7, 2018  Content Version: 12.2  © 7056-5692 "[employer]. Care instructions adapted under license by [employer] (which disclaims liability or warranty for this information). If you have questions about a medical condition or this instruction, always ask your healthcare professional. [place] [address] any warranty or liability for your use of this information.

## 2019-11-19 ENCOUNTER — APPOINTMENT (OUTPATIENT)
Dept: PHYSICAL THERAPY | Age: 69
End: 2019-11-19
Payer: MEDICARE

## 2019-11-21 ENCOUNTER — APPOINTMENT (OUTPATIENT)
Dept: PHYSICAL THERAPY | Age: 69
End: 2019-11-21
Payer: MEDICARE

## 2019-11-23 ENCOUNTER — APPOINTMENT (OUTPATIENT)
Dept: CT IMAGING | Age: 69
End: 2019-11-23
Attending: EMERGENCY MEDICINE
Payer: MEDICARE

## 2019-11-23 ENCOUNTER — HOSPITAL ENCOUNTER (EMERGENCY)
Age: 69
Discharge: HOME | End: 2019-11-23
Attending: EMERGENCY MEDICINE
Payer: MEDICARE

## 2019-11-23 VITALS
BODY MASS INDEX: 25.8 KG/M2 | DIASTOLIC BLOOD PRESSURE: 57 MMHG | HEIGHT: 64 IN | TEMPERATURE: 97.9 F | HEART RATE: 84 BPM | WEIGHT: 151.1 LBS | SYSTOLIC BLOOD PRESSURE: 147 MMHG | OXYGEN SATURATION: 98 % | RESPIRATION RATE: 20 BRPM

## 2019-11-23 DIAGNOSIS — R91.1 PULMONARY NODULE, RIGHT: Primary | ICD-10-CM

## 2019-11-23 DIAGNOSIS — E86.0 DEHYDRATION: ICD-10-CM

## 2019-11-23 DIAGNOSIS — R10.84 ABDOMINAL PAIN, GENERALIZED: ICD-10-CM

## 2019-11-23 DIAGNOSIS — H81.399 VERTIGO, PERIPHERAL, UNSPECIFIED LATERALITY: ICD-10-CM

## 2019-11-23 LAB
ALBUMIN SERPL-MCNC: 3.3 G/DL (ref 3.4–5)
ALBUMIN/GLOB SERPL: 0.8 {RATIO} (ref 0.8–1.7)
ALP SERPL-CCNC: 89 U/L (ref 45–117)
ALT SERPL-CCNC: 24 U/L (ref 13–56)
ANION GAP SERPL CALC-SCNC: 8 MMOL/L (ref 3–18)
APPEARANCE UR: CLEAR
AST SERPL-CCNC: 21 U/L (ref 10–38)
ATRIAL RATE: 72 BPM
BACTERIA URNS QL MICRO: ABNORMAL /HPF
BASOPHILS # BLD: 0 K/UL (ref 0–0.1)
BASOPHILS NFR BLD: 1 % (ref 0–2)
BILIRUB SERPL-MCNC: 0.2 MG/DL (ref 0.2–1)
BILIRUB UR QL: NEGATIVE
BUN SERPL-MCNC: 9 MG/DL (ref 7–18)
BUN/CREAT SERPL: 16 (ref 12–20)
CALCIUM SERPL-MCNC: 9.8 MG/DL (ref 8.5–10.1)
CALCULATED P AXIS, ECG09: 59 DEGREES
CALCULATED R AXIS, ECG10: 7 DEGREES
CALCULATED T AXIS, ECG11: 21 DEGREES
CHLORIDE SERPL-SCNC: 102 MMOL/L (ref 100–111)
CO2 SERPL-SCNC: 32 MMOL/L (ref 21–32)
COLOR UR: YELLOW
CREAT SERPL-MCNC: 0.55 MG/DL (ref 0.6–1.3)
DIAGNOSIS, 93000: NORMAL
DIFFERENTIAL METHOD BLD: ABNORMAL
EOSINOPHIL # BLD: 0 K/UL (ref 0–0.4)
EOSINOPHIL NFR BLD: 1 % (ref 0–5)
EPITH CASTS URNS QL MICRO: ABNORMAL /LPF (ref 0–5)
ERYTHROCYTE [DISTWIDTH] IN BLOOD BY AUTOMATED COUNT: 14.5 % (ref 11.6–14.5)
GLOBULIN SER CALC-MCNC: 4.4 G/DL (ref 2–4)
GLUCOSE SERPL-MCNC: 104 MG/DL (ref 74–99)
GLUCOSE UR STRIP.AUTO-MCNC: NEGATIVE MG/DL
HCT VFR BLD AUTO: 35.8 % (ref 35–45)
HGB BLD-MCNC: 12.3 G/DL (ref 12–16)
HGB UR QL STRIP: NEGATIVE
KETONES UR QL STRIP.AUTO: NEGATIVE MG/DL
LACTATE BLD-SCNC: 0.86 MMOL/L (ref 0.4–2)
LEUKOCYTE ESTERASE UR QL STRIP.AUTO: ABNORMAL
LIPASE SERPL-CCNC: 63 U/L (ref 73–393)
LYMPHOCYTES # BLD: 1.3 K/UL (ref 0.9–3.6)
LYMPHOCYTES NFR BLD: 31 % (ref 21–52)
MCH RBC QN AUTO: 29.2 PG (ref 24–34)
MCHC RBC AUTO-ENTMCNC: 34.4 G/DL (ref 31–37)
MCV RBC AUTO: 85 FL (ref 74–97)
MONOCYTES # BLD: 0.5 K/UL (ref 0.05–1.2)
MONOCYTES NFR BLD: 11 % (ref 3–10)
NEUTS SEG # BLD: 2.4 K/UL (ref 1.8–8)
NEUTS SEG NFR BLD: 56 % (ref 40–73)
NITRITE UR QL STRIP.AUTO: NEGATIVE
P-R INTERVAL, ECG05: 150 MS
PH UR STRIP: 6.5 [PH] (ref 5–8)
PLATELET # BLD AUTO: 217 K/UL (ref 135–420)
PMV BLD AUTO: 11.1 FL (ref 9.2–11.8)
POTASSIUM SERPL-SCNC: 3.3 MMOL/L (ref 3.5–5.5)
PROT SERPL-MCNC: 7.7 G/DL (ref 6.4–8.2)
PROT UR STRIP-MCNC: NEGATIVE MG/DL
Q-T INTERVAL, ECG07: 398 MS
QRS DURATION, ECG06: 92 MS
QTC CALCULATION (BEZET), ECG08: 435 MS
RBC # BLD AUTO: 4.21 M/UL (ref 4.2–5.3)
RBC #/AREA URNS HPF: NEGATIVE /HPF (ref 0–5)
SODIUM SERPL-SCNC: 142 MMOL/L (ref 136–145)
SP GR UR REFRACTOMETRY: 1.01 (ref 1–1.03)
TROPONIN I SERPL-MCNC: <0.02 NG/ML (ref 0–0.04)
UROBILINOGEN UR QL STRIP.AUTO: 0.2 EU/DL (ref 0.2–1)
VENTRICULAR RATE, ECG03: 72 BPM
WBC # BLD AUTO: 4.3 K/UL (ref 4.6–13.2)
WBC URNS QL MICRO: ABNORMAL /HPF (ref 0–4)

## 2019-11-23 PROCEDURE — 85025 COMPLETE CBC W/AUTO DIFF WBC: CPT

## 2019-11-23 PROCEDURE — 74011250636 HC RX REV CODE- 250/636: Performed by: EMERGENCY MEDICINE

## 2019-11-23 PROCEDURE — 96374 THER/PROPH/DIAG INJ IV PUSH: CPT

## 2019-11-23 PROCEDURE — 96375 TX/PRO/DX INJ NEW DRUG ADDON: CPT

## 2019-11-23 PROCEDURE — 74011636320 HC RX REV CODE- 636/320: Performed by: EMERGENCY MEDICINE

## 2019-11-23 PROCEDURE — 81001 URINALYSIS AUTO W/SCOPE: CPT

## 2019-11-23 PROCEDURE — 80053 COMPREHEN METABOLIC PANEL: CPT

## 2019-11-23 PROCEDURE — 74011250637 HC RX REV CODE- 250/637: Performed by: EMERGENCY MEDICINE

## 2019-11-23 PROCEDURE — 99285 EMERGENCY DEPT VISIT HI MDM: CPT

## 2019-11-23 PROCEDURE — 83690 ASSAY OF LIPASE: CPT

## 2019-11-23 PROCEDURE — 74177 CT ABD & PELVIS W/CONTRAST: CPT

## 2019-11-23 PROCEDURE — 83605 ASSAY OF LACTIC ACID: CPT

## 2019-11-23 PROCEDURE — 93005 ELECTROCARDIOGRAM TRACING: CPT

## 2019-11-23 PROCEDURE — 84484 ASSAY OF TROPONIN QUANT: CPT

## 2019-11-23 RX ORDER — METOCLOPRAMIDE 10 MG/1
10 TABLET ORAL
Qty: 12 TAB | Refills: 0 | Status: SHIPPED | OUTPATIENT
Start: 2019-11-23 | End: 2019-12-03

## 2019-11-23 RX ORDER — ACETAMINOPHEN 500 MG
1000 TABLET ORAL ONCE
Status: COMPLETED | OUTPATIENT
Start: 2019-11-23 | End: 2019-11-23

## 2019-11-23 RX ORDER — FAMOTIDINE 20 MG/1
20 TABLET, FILM COATED ORAL 2 TIMES DAILY
Qty: 60 TAB | Refills: 0 | Status: SHIPPED | OUTPATIENT
Start: 2019-11-23 | End: 2019-12-23

## 2019-11-23 RX ORDER — METOCLOPRAMIDE HYDROCHLORIDE 5 MG/ML
10 INJECTION INTRAMUSCULAR; INTRAVENOUS ONCE
Status: COMPLETED | OUTPATIENT
Start: 2019-11-23 | End: 2019-11-23

## 2019-11-23 RX ORDER — FAMOTIDINE 10 MG/ML
20 INJECTION INTRAVENOUS
Status: COMPLETED | OUTPATIENT
Start: 2019-11-23 | End: 2019-11-23

## 2019-11-23 RX ORDER — MECLIZINE HCL 12.5 MG 12.5 MG/1
50 TABLET ORAL
Status: COMPLETED | OUTPATIENT
Start: 2019-11-23 | End: 2019-11-23

## 2019-11-23 RX ADMIN — SODIUM CHLORIDE, SODIUM LACTATE, POTASSIUM CHLORIDE, AND CALCIUM CHLORIDE 1000 ML: 600; 310; 30; 20 INJECTION, SOLUTION INTRAVENOUS at 08:48

## 2019-11-23 RX ADMIN — FAMOTIDINE 20 MG: 10 INJECTION, SOLUTION INTRAVENOUS at 08:47

## 2019-11-23 RX ADMIN — IOPAMIDOL 100 ML: 612 INJECTION, SOLUTION INTRAVENOUS at 11:13

## 2019-11-23 RX ADMIN — ACETAMINOPHEN 1000 MG: 500 TABLET ORAL at 08:47

## 2019-11-23 RX ADMIN — POTASSIUM BICARBONATE 40 MEQ: 782 TABLET, EFFERVESCENT ORAL at 13:41

## 2019-11-23 RX ADMIN — SODIUM CHLORIDE, SODIUM LACTATE, POTASSIUM CHLORIDE, AND CALCIUM CHLORIDE 1000 ML: 600; 310; 30; 20 INJECTION, SOLUTION INTRAVENOUS at 09:39

## 2019-11-23 RX ADMIN — METOCLOPRAMIDE 10 MG: 5 INJECTION, SOLUTION INTRAMUSCULAR; INTRAVENOUS at 08:47

## 2019-11-23 RX ADMIN — MECLIZINE 50 MG: 12.5 TABLET ORAL at 13:41

## 2019-11-23 NOTE — ED TRIAGE NOTES
Patient arrives via EMS from home with c/o stomach bug times weeks. Patient states she is unable to keep anything down.

## 2019-11-23 NOTE — DISCHARGE INSTRUCTIONS
Patient Education        Dehydration: Care Instructions  Your Care Instructions  Dehydration happens when your body loses too much fluid. This might happen when you do not drink enough water or you lose large amounts of fluids from your body because of diarrhea, vomiting, or sweating. Severe dehydration can be life-threatening. Water and minerals called electrolytes help put your body fluids back in balance. Learn the early signs of fluid loss, and drink more fluids to prevent dehydration. Follow-up care is a key part of your treatment and safety. Be sure to make and go to all appointments, and call your doctor if you are having problems. It's also a good idea to know your test results and keep a list of the medicines you take. How can you care for yourself at home? · To prevent dehydration, drink plenty of fluids, enough so that your urine is light yellow or clear like water. Choose water and other caffeine-free clear liquids until you feel better. If you have kidney, heart, or liver disease and have to limit fluids, talk with your doctor before you increase the amount of fluids you drink. · If you do not feel like eating or drinking, try taking small sips of water, sports drinks, or other rehydration drinks. · Get plenty of rest.  To prevent dehydration  · Add more fluids to your diet and daily routine, unless your doctor has told you not to. · During hot weather, drink more fluids. Drink even more fluids if you exercise a lot. Stay away from drinks with alcohol or caffeine. · Watch for the symptoms of dehydration. These include:  ? A dry, sticky mouth. ? Dark yellow urine, and not much of it. ? Dry and sunken eyes. ? Feeling very tired. · Learn what problems can lead to dehydration. These include:  ? Diarrhea, fever, and vomiting. ? Any illness with a fever, such as pneumonia or the flu. ?  Activities that cause heavy sweating, such as endurance races and heavy outdoor work in hot or humid weather. ? Alcohol or drug use or problems caused by quitting their use (withdrawal). ? Certain medicines, such as cold and allergy pills (antihistamines), diet pills (diuretics), and laxatives. ? Certain diseases, such as diabetes, cancer, and heart or kidney disease. When should you call for help? Call 911 anytime you think you may need emergency care. For example, call if:    · You passed out (lost consciousness).    Call your doctor now or seek immediate medical care if:    · You are confused and cannot think clearly.     · You are dizzy or lightheaded, or you feel like you may faint.     · You have signs of needing more fluids. You have sunken eyes and a dry mouth, and you pass only a little dark urine.     · You cannot keep fluids down.    Watch closely for changes in your health, and be sure to contact your doctor if:    · You are not making tears.     · Your skin is very dry and sags slowly back into place after you pinch it.     · Your mouth and eyes are very dry. Where can you learn more? Go to http://bri-khari.info/. Enter Q497 in the search box to learn more about \"Dehydration: Care Instructions. \"  Current as of: June 26, 2019  Content Version: 12.2  © 1785-7238 [employer]. Care instructions adapted under license by [employer] (which disclaims liability or warranty for this information). If you have questions about a medical condition or this instruction, always ask your healthcare professional. [place] [address] any warranty or liability for your use of this information.

## 2019-11-23 NOTE — ED PROVIDER NOTES
EMERGENCY DEPARTMENT HISTORY AND PHYSICAL EXAM    8:11 AM      Date: 11/23/2019  Patient Name: Clare Ponce    History of Presenting Illness     Chief Complaint   Patient presents with    Abdominal Pain         History Provided By: Patient      Additional History (Context): Clare Ponce is a 78 y.o. female with hypertension and hyperlipidemia who presents with feeling weak all over. Patient has had intermittent stomach pains, nausea, had vomiting last week when she was told she had a virus, still not feeling well, not having high appetite, not eating or drinking like she normally does. She also has bilateral flank pain, has noticed she is urinating frequently. She is not having diarrhea, she had a stool last night which was normal however she is not having frequent bowel movements. No prior surgeries to her abdomen. Occasionally has chills, no castro fevers. Denies chest pain, shortness of breath    PCP: Amaya Drew NP    Current Outpatient Medications   Medication Sig Dispense Refill    famotidine (PEPCID) 20 mg tablet Take 1 Tab by mouth two (2) times a day for 30 days. 60 Tab 0    metoclopramide HCl (REGLAN) 10 mg tablet Take 1 Tab by mouth every six (6) hours as needed for Nausea for up to 10 days. 12 Tab 0    hydrocortisone (ANUSOL-HC) 2.5 % rectal cream Insert  into rectum four (4) times daily. 30 g 0    LORazepam (ATIVAN) 0.5 mg tablet Take 1 Tab by mouth daily as needed for Anxiety. 4 Tab 0    sertraline (ZOLOFT) 100 mg tablet Take 100 mg by mouth daily.  butalbital-acetaminophen-caff (FIORICET) -40 mg per capsule Take 1 Cap by mouth every six (6) hours as needed for Pain. 15 Cap 0    diclofenac (VOLTAREN) 1 % gel Apply 4 g to affected area four (4) times daily. 100 g 0    hydroCHLOROthiazide (HYDRODIURIL) 25 mg tablet Take 25 mg by mouth daily.  amLODIPine (NORVASC) 10 mg tablet Take 10 mg by mouth daily.  aspirin 81 mg chewable tablet Take 81 mg by mouth daily. Past History     Past Medical History:  Past Medical History:   Diagnosis Date    Depression     High cholesterol     Hypertension        Past Surgical History:  No past surgical history on file. Family History:  No family history on file. Social History:  Social History     Tobacco Use    Smoking status: Never Smoker    Smokeless tobacco: Never Used   Substance Use Topics    Alcohol use: Not Currently    Drug use: Never       Allergies: Allergies   Allergen Reactions    Aleve [Naproxen Sodium] Unable to Obtain    Bactrim [Sulfamethoprim] Diarrhea    Diphenhydramine Drowsiness     Patient states \"It makes me sleepy\"    Feldene [Piroxicam] Unknown (comments)     Shaking      Penicillins Rash         Review of Systems       Review of Systems   Constitutional: Negative. Negative for activity change, chills and fever. HENT: Negative. Negative for ear pain, hearing loss and sore throat. Eyes: Negative. Negative for pain and visual disturbance. Respiratory: Negative. Negative for cough, chest tightness and shortness of breath. Cardiovascular: Negative. Negative for chest pain and leg swelling. Gastrointestinal: Positive for abdominal pain and nausea. Negative for abdominal distention. Genitourinary: Positive for flank pain. Negative for dysuria and hematuria. Skin: Negative. Negative for rash. Neurological: Positive for weakness. Negative for dizziness and headaches. Psychiatric/Behavioral: Negative. Negative for agitation and behavioral problems. Physical Exam     Visit Vitals  /57   Pulse 84   Temp 97.9 °F (36.6 °C)   Resp 20   Ht 5' 4\" (1.626 m)   Wt 68.5 kg (151 lb 1.6 oz)   SpO2 98%   BMI 25.94 kg/m²         Physical Exam  Constitutional:       Appearance: She is well-developed. HENT:      Head: Normocephalic and atraumatic. Eyes:      General:         Right eye: No discharge. Left eye: No discharge.       Pupils: Pupils are equal, round, and reactive to light. Neck:      Musculoskeletal: Normal range of motion and neck supple. Vascular: No JVD. Trachea: No tracheal deviation. Cardiovascular:      Rate and Rhythm: Normal rate and regular rhythm. Heart sounds: Normal heart sounds. No murmur. Pulmonary:      Effort: Pulmonary effort is normal. No respiratory distress. Breath sounds: Normal breath sounds. No wheezing or rales. Abdominal:      General: Bowel sounds are normal. There is no distension. Palpations: Abdomen is soft. Tenderness: There is tenderness. There is no rebound. Comments: Epigastric ttp  Neg medrano, neg mcburney   Musculoskeletal: Normal range of motion. General: No tenderness or deformity. Skin:     General: Skin is warm and dry. Findings: No erythema or rash. Neurological:      Mental Status: She is alert and oriented to person, place, and time. Cranial Nerves: No cranial nerve deficit. Comments: 5/5 strength UE/LE, 5/5 sensation UE/LE     Psychiatric:         Behavior: Behavior normal.           Diagnostic Study Results     Labs -  Recent Results (from the past 12 hour(s))   CBC WITH AUTOMATED DIFF    Collection Time: 11/23/19  8:40 AM   Result Value Ref Range    WBC 4.3 (L) 4.6 - 13.2 K/uL    RBC 4.21 4.20 - 5.30 M/uL    HGB 12.3 12.0 - 16.0 g/dL    HCT 35.8 35.0 - 45.0 %    MCV 85.0 74.0 - 97.0 FL    MCH 29.2 24.0 - 34.0 PG    MCHC 34.4 31.0 - 37.0 g/dL    RDW 14.5 11.6 - 14.5 %    PLATELET 545 514 - 491 K/uL    MPV 11.1 9.2 - 11.8 FL    NEUTROPHILS 56 40 - 73 %    LYMPHOCYTES 31 21 - 52 %    MONOCYTES 11 (H) 3 - 10 %    EOSINOPHILS 1 0 - 5 %    BASOPHILS 1 0 - 2 %    ABS. NEUTROPHILS 2.4 1.8 - 8.0 K/UL    ABS. LYMPHOCYTES 1.3 0.9 - 3.6 K/UL    ABS. MONOCYTES 0.5 0.05 - 1.2 K/UL    ABS. EOSINOPHILS 0.0 0.0 - 0.4 K/UL    ABS.  BASOPHILS 0.0 0.0 - 0.1 K/UL    DF AUTOMATED     METABOLIC PANEL, COMPREHENSIVE    Collection Time: 11/23/19  8:40 AM   Result Value Ref Range    Sodium 142 136 - 145 mmol/L    Potassium 3.3 (L) 3.5 - 5.5 mmol/L    Chloride 102 100 - 111 mmol/L    CO2 32 21 - 32 mmol/L    Anion gap 8 3.0 - 18 mmol/L    Glucose 104 (H) 74 - 99 mg/dL    BUN 9 7.0 - 18 MG/DL    Creatinine 0.55 (L) 0.6 - 1.3 MG/DL    BUN/Creatinine ratio 16 12 - 20      GFR est AA >60 >60 ml/min/1.73m2    GFR est non-AA >60 >60 ml/min/1.73m2    Calcium 9.8 8.5 - 10.1 MG/DL    Bilirubin, total 0.2 0.2 - 1.0 MG/DL    ALT (SGPT) 24 13 - 56 U/L    AST (SGOT) 21 10 - 38 U/L    Alk.  phosphatase 89 45 - 117 U/L    Protein, total 7.7 6.4 - 8.2 g/dL    Albumin 3.3 (L) 3.4 - 5.0 g/dL    Globulin 4.4 (H) 2.0 - 4.0 g/dL    A-G Ratio 0.8 0.8 - 1.7     TROPONIN I    Collection Time: 11/23/19  8:40 AM   Result Value Ref Range    Troponin-I, QT <0.02 0.0 - 0.045 NG/ML   LIPASE    Collection Time: 11/23/19  8:40 AM   Result Value Ref Range    Lipase 63 (L) 73 - 393 U/L   POC LACTIC ACID    Collection Time: 11/23/19  8:45 AM   Result Value Ref Range    Lactic Acid (POC) 0.86 0.40 - 2.00 mmol/L   EKG, 12 LEAD, INITIAL    Collection Time: 11/23/19  8:58 AM   Result Value Ref Range    Ventricular Rate 72 BPM    Atrial Rate 72 BPM    P-R Interval 150 ms    QRS Duration 92 ms    Q-T Interval 398 ms    QTC Calculation (Bezet) 435 ms    Calculated P Axis 59 degrees    Calculated R Axis 7 degrees    Calculated T Axis 21 degrees    Diagnosis       Normal sinus rhythm  Possible Left atrial enlargement  Nonspecific T wave abnormality  Abnormal ECG  When compared with ECG of 14-NOV-2019 11:46,  Nonspecific T wave abnormality, worse in Inferior leads  Confirmed by Lisandra Trevino MD, ----- ([id number]) on 11/23/2019 9:46:13 AM     URINALYSIS W/ RFLX MICROSCOPIC    Collection Time: 11/23/19  9:26 AM   Result Value Ref Range    Color YELLOW      Appearance CLEAR      Specific gravity 1.008 1.005 - 1.030      pH (UA) 6.5 5.0 - 8.0      Protein NEGATIVE  NEG mg/dL    Glucose NEGATIVE  NEG mg/dL    Ketone NEGATIVE  NEG mg/dL Bilirubin NEGATIVE  NEG      Blood NEGATIVE  NEG      Urobilinogen 0.2 0.2 - 1.0 EU/dL    Nitrites NEGATIVE  NEG      Leukocyte Esterase TRACE (A) NEG     URINE MICROSCOPIC ONLY    Collection Time: 11/23/19  9:26 AM   Result Value Ref Range    WBC 1 to 3 0 - 4 /hpf    RBC NEGATIVE  0 - 5 /hpf    Epithelial cells FEW 0 - 5 /lpf    Bacteria FEW (A) NEG /hpf       Radiologic Studies -   CT ABD PELV W CONT   Final Result   Impression:       No acute CT abnormality identified accounting for abdominal pain. Small 7 x 5 mm pulmonary nodule right lower lobe laterally in the costophrenic   sulcus. Recommend CT follow-up per Fleischner Society guidelines. Hepatic steatosis. Atherosclerotic changes. Small hiatal hernia. Fibroid uterus. Additional nonacute findings as above. Medical Decision Making   I am the first provider for this patient. I reviewed the vital signs, available nursing notes, past medical history, past surgical history, family history and social history. Vital Signs-Reviewed the patient's vital signs. EKG: Interpreted by the EP.    Time Interpreted: 858   Rate: 72   Rhythm: Normal Sinus Rhythm    Interpretation: LAD, normal intervals, no ischemia   Comparison: unchanged    Records Reviewed: Nursing Notes and Old Medical Records      Provider Notes (Medical Decision Making):     MDM  Number of Diagnoses or Management Options  Abdominal pain, generalized:   Dehydration:   Pulmonary nodule, right:   Vertigo, peripheral, unspecified laterality:   Diagnosis management comments: Differential Diagnosis: Gastroparesis, cyclical vomiting, gastroenteritis, reflux    Patient is presenting with the same symptoms she has had for weeks, unclear etiology although she does have bilateral CVA tenderness and urinary frequency, she has not had any imaging of her abdomen, we will check basic labs to see if we find anything but may proceed with CT scan to rule out other processes. Will give fluids as she is very dehydrated, Reglan to promote motility, will need GI follow-up, will p.o. challenge prior to discharge if nothing else is found. Reevaluation: Patient's labs came back unremarkable however given the length of time that she has had abdominal symptoms CT scan was ordered which did not show any acute findings but did show pulmonary nodule, she was informed of this and provided with follow-up precautions. On reassessment, patient not having any more abdominal pain but now complaining of dizziness, this is provoked with movement, primarily to her left, she has had this on and off for weeks to months, she told her primary care provider about it and she did not get any significant medications or prescriptions or work-up for this. She denies any hearing changes or fullness, this sounds like a peripheral cause, if it were central she is out of the window for any intervention, she was given meclizine and responded well, has been pacing the halls, she is not having any instability, very low concern for central cause at this time. Patient will follow-up with her primary care doctor as well as ENT if her dizziness persists    Safe for d/c, careful return precautions given. Pt/family comfortable with plan    Lynette Alvarado MD                  Diagnosis     Clinical Impression:   1. Pulmonary nodule, right    2. Dehydration    3. Vertigo, peripheral, unspecified laterality    4.  Abdominal pain, generalized        Disposition: Home    Follow-up Information     Follow up With Specialties Details Why Contact Info    Genet Hansen NP Nurse Practitioner In 1 week regarding pulmonary nodule [address] [facility] [place], [place] Gastroenterology In 3 days  [address] [facility] [address]      [facility] EMERGENCY DEPT Emergency Medicine  As needed, If symptoms worsen [address] [facility] [address]  [phone]           Discharge Medication List as of 11/23/2019  2:49 PM      START taking these medications    Details   famotidine (PEPCID) 20 mg tablet Take 1 Tab by mouth two (2) times a day for 30 days. , Print, Disp-60 Tab, R-0      metoclopramide HCl (REGLAN) 10 mg tablet Take 1 Tab by mouth every six (6) hours as needed for Nausea for up to 10 days. , Print, Disp-12 Tab, R-0         CONTINUE these medications which have NOT CHANGED    Details   hydrocortisone (ANUSOL-HC) 2.5 % rectal cream Insert  into rectum four (4) times daily. , Print, Disp-30 g, R-0      LORazepam (ATIVAN) 0.5 mg tablet Take 1 Tab by mouth daily as needed for Anxiety. , Print, Disp-4 Tab, R-0      sertraline (ZOLOFT) 100 mg tablet Take 100 mg by mouth daily. , Historical Med      butalbital-acetaminophen-caff (FIORICET) -40 mg per capsule Take 1 Cap by mouth every six (6) hours as needed for Pain., Print, Disp-15 Cap, R-0      diclofenac (VOLTAREN) 1 % gel Apply 4 g to affected area four (4) times daily. , Normal, Disp-100 g, R-0      hydroCHLOROthiazide (HYDRODIURIL) 25 mg tablet Take 25 mg by mouth daily. , Historical Med      amLODIPine (NORVASC) 10 mg tablet Take 10 mg by mouth daily. , Historical Med      aspirin 81 mg chewable tablet Take 81 mg by mouth daily. , Historical Med         STOP taking these medications       ondansetron (ZOFRAN ODT) 4 mg disintegrating tablet Comments:   Reason for Stopping:             _______________________________    Please note that this dictation was completed with [employer], the [employer] voice recognition software. Quite often unanticipated grammatical, syntax, homophones, and other interpretive errors are inadvertently transcribed by the computer software. Please disregard these errors. Please excuse any errors that have escaped final proofreading.

## 2019-11-24 ENCOUNTER — APPOINTMENT (OUTPATIENT)
Dept: GENERAL RADIOLOGY | Age: 69
End: 2019-11-24
Attending: EMERGENCY MEDICINE
Payer: MEDICARE

## 2019-11-24 ENCOUNTER — HOSPITAL ENCOUNTER (EMERGENCY)
Age: 69
Discharge: HOME | End: 2019-11-24
Attending: EMERGENCY MEDICINE
Payer: MEDICARE

## 2019-11-24 VITALS
TEMPERATURE: 98 F | WEIGHT: 151 LBS | HEIGHT: 64 IN | RESPIRATION RATE: 19 BRPM | BODY MASS INDEX: 25.78 KG/M2 | SYSTOLIC BLOOD PRESSURE: 149 MMHG | DIASTOLIC BLOOD PRESSURE: 62 MMHG | OXYGEN SATURATION: 100 % | HEART RATE: 88 BPM

## 2019-11-24 DIAGNOSIS — R42 LIGHTHEADEDNESS: Primary | ICD-10-CM

## 2019-11-24 LAB
ALBUMIN SERPL-MCNC: 3.3 G/DL (ref 3.4–5)
ALBUMIN/GLOB SERPL: 0.7 {RATIO} (ref 0.8–1.7)
ALP SERPL-CCNC: 84 U/L (ref 45–117)
ALT SERPL-CCNC: 19 U/L (ref 13–56)
ANION GAP SERPL CALC-SCNC: 6 MMOL/L (ref 3–18)
AST SERPL-CCNC: 15 U/L (ref 10–38)
BASOPHILS # BLD: 0 K/UL (ref 0–0.1)
BASOPHILS NFR BLD: 0 % (ref 0–2)
BILIRUB SERPL-MCNC: 0.2 MG/DL (ref 0.2–1)
BUN SERPL-MCNC: 7 MG/DL (ref 7–18)
BUN/CREAT SERPL: 13 (ref 12–20)
CALCIUM SERPL-MCNC: 9.1 MG/DL (ref 8.5–10.1)
CHLORIDE SERPL-SCNC: 105 MMOL/L (ref 100–111)
CK MB CFR SERPL CALC: NORMAL % (ref 0–4)
CK MB SERPL-MCNC: <1 NG/ML (ref 5–25)
CK SERPL-CCNC: 76 U/L (ref 26–192)
CO2 SERPL-SCNC: 28 MMOL/L (ref 21–32)
CREAT SERPL-MCNC: 0.52 MG/DL (ref 0.6–1.3)
DIFFERENTIAL METHOD BLD: ABNORMAL
EOSINOPHIL # BLD: 0 K/UL (ref 0–0.4)
EOSINOPHIL NFR BLD: 1 % (ref 0–5)
ERYTHROCYTE [DISTWIDTH] IN BLOOD BY AUTOMATED COUNT: 14.9 % (ref 11.6–14.5)
GLOBULIN SER CALC-MCNC: 4.6 G/DL (ref 2–4)
GLUCOSE SERPL-MCNC: 101 MG/DL (ref 74–99)
HCT VFR BLD AUTO: 35.8 % (ref 35–45)
HGB BLD-MCNC: 12.3 G/DL (ref 12–16)
LYMPHOCYTES # BLD: 1.5 K/UL (ref 0.9–3.6)
LYMPHOCYTES NFR BLD: 33 % (ref 21–52)
MAGNESIUM SERPL-MCNC: 1.8 MG/DL (ref 1.6–2.6)
MCH RBC QN AUTO: 29.4 PG (ref 24–34)
MCHC RBC AUTO-ENTMCNC: 34.4 G/DL (ref 31–37)
MCV RBC AUTO: 85.6 FL (ref 74–97)
MONOCYTES # BLD: 0.5 K/UL (ref 0.05–1.2)
MONOCYTES NFR BLD: 11 % (ref 3–10)
NEUTS SEG # BLD: 2.6 K/UL (ref 1.8–8)
NEUTS SEG NFR BLD: 55 % (ref 40–73)
PLATELET # BLD AUTO: 194 K/UL (ref 135–420)
PMV BLD AUTO: 10.7 FL (ref 9.2–11.8)
POTASSIUM SERPL-SCNC: 3.6 MMOL/L (ref 3.5–5.5)
PROT SERPL-MCNC: 7.9 G/DL (ref 6.4–8.2)
RBC # BLD AUTO: 4.18 M/UL (ref 4.2–5.3)
SODIUM SERPL-SCNC: 139 MMOL/L (ref 136–145)
TROPONIN I SERPL-MCNC: <0.02 NG/ML (ref 0–0.04)
WBC # BLD AUTO: 4.7 K/UL (ref 4.6–13.2)

## 2019-11-24 PROCEDURE — 99284 EMERGENCY DEPT VISIT MOD MDM: CPT

## 2019-11-24 PROCEDURE — 83735 ASSAY OF MAGNESIUM: CPT

## 2019-11-24 PROCEDURE — 93005 ELECTROCARDIOGRAM TRACING: CPT

## 2019-11-24 PROCEDURE — 82550 ASSAY OF CK (CPK): CPT

## 2019-11-24 PROCEDURE — 71045 X-RAY EXAM CHEST 1 VIEW: CPT

## 2019-11-24 PROCEDURE — 80053 COMPREHEN METABOLIC PANEL: CPT

## 2019-11-24 PROCEDURE — 85025 COMPLETE CBC W/AUTO DIFF WBC: CPT

## 2019-11-24 NOTE — DISCHARGE INSTRUCTIONS

## 2019-11-24 NOTE — ED TRIAGE NOTES
The patient presents for evaluation of fatigue. She was seen here yesterday for same. States, \"I just feel tired. I couldn't get up to go to [employer] this morning. \"

## 2019-11-24 NOTE — ED PROVIDER NOTES
EMERGENCY DEPARTMENT HISTORY AND PHYSICAL EXAM  This was created with voice recognition software and transcription errors may be present. 11:43 AM  Date: 11/24/2019  Patient Name: Jonatan Escobar    History of Presenting Illness     Chief Complaint:    History Provided By:     HPI: Jonatan Escobar is a 78 y.o. female past medical history of hypertension high cholesterol and depression who presents with abdominal pain and lightheadedness. Patient states that she was seen yesterday for similar symptoms she been having some vomiting and diarrhea as well viral syndrome and discharge. She presents today for continued lightheadedness. She feels like she may pass out she has some left-sided epigastric pain without vomiting or diarrhea. No chest pain. No shortness of breath. No diaphoresis. PCP: Papo Ackerman NP      Past History     Past Medical History:  Past Medical History:   Diagnosis Date    Depression     High cholesterol     Hypertension        Past Surgical History:  History reviewed. No pertinent surgical history. Family History:  History reviewed. No pertinent family history. Social History:  Social History     Tobacco Use    Smoking status: Never Smoker    Smokeless tobacco: Never Used   Substance Use Topics    Alcohol use: Not Currently    Drug use: Never       Allergies: Allergies   Allergen Reactions    Aleve [Naproxen Sodium] Unable to Obtain    Bactrim [Sulfamethoprim] Diarrhea    Diphenhydramine Drowsiness     Patient states \"It makes me sleepy\"    Feldene [Piroxicam] Unknown (comments)     Shaking      Penicillins Rash       Review of Systems     Review of Systems   All other systems reviewed and are negative. 10 point review of systems otherwise negative unless noted in HPI. Physical Exam       Physical Exam  Constitutional:       Appearance: She is well-developed. HENT:      Head: Normocephalic and atraumatic.    Eyes:      Pupils: Pupils are equal, round, and reactive to light. Neck:      Musculoskeletal: Normal range of motion and neck supple. Cardiovascular:      Rate and Rhythm: Normal rate and regular rhythm. Heart sounds: Normal heart sounds. No murmur. No friction rub. Pulmonary:      Effort: Pulmonary effort is normal. No respiratory distress. Breath sounds: Normal breath sounds. No wheezing. Abdominal:      General: There is no distension. Palpations: Abdomen is soft. Tenderness: There is tenderness. There is no guarding or rebound. Comments: Mild left-sided tenderness   Musculoskeletal: Normal range of motion. Skin:     General: Skin is warm and dry. Neurological:      Mental Status: She is alert and oriented to person, place, and time. Psychiatric:         Behavior: Behavior normal.         Thought Content: Thought content normal.         Diagnostic Study Results     Vital Signs   Visit Vitals  /61   Pulse 79   Temp 98 °F (36.7 °C)   Resp 23   Ht 5' 4\" (1.626 m)   Wt 68.5 kg (151 lb)   SpO2 97%   BMI 25.92 kg/m²      EKG:EKG shows sinus at 76 with a normal axis normal intervals there is no ST elevation or depression no hypertrophy  Labs: chem wnl; trop neg; cbc wnl    Imaging:   Minor linear left basilar opacity, likely atelectasis or possibly scar. Allowing  for slight differences in technique, no other significant change is seen  compared to the prior frontal radiograph. No s/sx of pna     Medical Decision Making     ED Course: Progress Notes, Reevaluation, and Consults:      Provider Notes (Medical Decision Making): This is a 69-year-old female presents with lightheadedness had viral syndrome yesterday evaluate EKG basic labs reassess. No s/sx of pna will d/c;/ + lightheaded        Diagnosis     Clinical Impression: No diagnosis found. Disposition:    Patient's Medications   Start Taking    No medications on file   Continue Taking    AMLODIPINE (NORVASC) 10 MG TABLET    Take 10 mg by mouth daily. ASPIRIN 81 MG CHEWABLE TABLET    Take 81 mg by mouth daily. BUTALBITAL-ACETAMINOPHEN-CAFF (FIORICET) -40 MG PER CAPSULE    Take 1 Cap by mouth every six (6) hours as needed for Pain. DICLOFENAC (VOLTAREN) 1 % GEL    Apply 4 g to affected area four (4) times daily. FAMOTIDINE (PEPCID) 20 MG TABLET    Take 1 Tab by mouth two (2) times a day for 30 days. HYDROCHLOROTHIAZIDE (HYDRODIURIL) 25 MG TABLET    Take 25 mg by mouth daily. HYDROCORTISONE (ANUSOL-HC) 2.5 % RECTAL CREAM    Insert  into rectum four (4) times daily. LORAZEPAM (ATIVAN) 0.5 MG TABLET    Take 1 Tab by mouth daily as needed for Anxiety. METOCLOPRAMIDE HCL (REGLAN) 10 MG TABLET    Take 1 Tab by mouth every six (6) hours as needed for Nausea for up to 10 days. SERTRALINE (ZOLOFT) 100 MG TABLET    Take 100 mg by mouth daily.    These Medications have changed    No medications on file   Stop Taking    No medications on file

## 2019-11-25 ENCOUNTER — HOSPITAL ENCOUNTER (OUTPATIENT)
Dept: ULTRASOUND IMAGING | Age: 69
Discharge: HOME | End: 2019-11-25
Payer: MEDICARE

## 2019-11-25 DIAGNOSIS — R10.11 RIGHT UPPER QUADRANT PAIN: ICD-10-CM

## 2019-11-25 LAB
ATRIAL RATE: 76 BPM
CALCULATED P AXIS, ECG09: 50 DEGREES
CALCULATED R AXIS, ECG10: 26 DEGREES
CALCULATED T AXIS, ECG11: 42 DEGREES
DIAGNOSIS, 93000: NORMAL
P-R INTERVAL, ECG05: 132 MS
Q-T INTERVAL, ECG07: 380 MS
QRS DURATION, ECG06: 90 MS
QTC CALCULATION (BEZET), ECG08: 427 MS
VENTRICULAR RATE, ECG03: 76 BPM

## 2019-11-25 PROCEDURE — 76705 ECHO EXAM OF ABDOMEN: CPT

## 2019-11-26 ENCOUNTER — HOSPITAL ENCOUNTER (OUTPATIENT)
Dept: PHYSICAL THERAPY | Age: 69
Discharge: HOME | End: 2019-11-26
Payer: MEDICARE

## 2019-11-26 PROCEDURE — 97164 PT RE-EVAL EST PLAN CARE: CPT

## 2019-11-26 PROCEDURE — 97110 THERAPEUTIC EXERCISES: CPT

## 2019-11-26 NOTE — PROGRESS NOTES
PT DAILY TREATMENT NOTE 10-18    Patient Name: Kayleigh Rangel  Date:2019  : 1950  [x]  Patient  Verified  Payor: Sorin Saeed / Plan: [address] [place] [address] HMO / Product Type: Managed Care Medicare /    In time: 230  Out time: 303  Total Treatment Time (min): 33  Visit #: 7 of 16      Medicare/BCBS Only   Total Timed Codes (min):  33 1:1 Treatment Time:  30       Treatment Area: Pain in left knee [M25.562]  Unilateral primary osteoarthritis, left knee [M17.12]    SUBJECTIVE  Pain Level (0-10 scale): Any medication changes, allergies to medications, adverse drug reactions, diagnosis change, or new procedure performed?: [x] No    [] Yes (see summary sheet for update)  Subjective functional status/changes:   [] No changes reported  Patient states she has not been feeling well, has trouble with her appetite. Has been to ER, had some testing. She has been having dizziness, intermittent, has follow up appointment with PCP. OBJECTIVE    15 minutes re-evaluation     18 min Therapeutic Exercise:  [x] See flow sheet :   Rationale: increase ROM and increase strength to improve the patients ability to increase ease of ambulation. With   [] TE   [] TA   [] neuro   [] other: Patient Education: [x] Review HEP    [] Progressed/Changed HEP based on:   [] positioning   [] body mechanics   [] transfers   [] heat/ice application    [] other:      Other Objective/Functional Measures:   Sedentary lifestyle, using knee brace  Challenged with BBK SAQ       Pain Level (0-10 scale) post treatment: 4/10    ASSESSMENT/Changes in Function: see progress note/recertification. Patient will continue to benefit from skilled PT services to modify and progress therapeutic interventions, address ROM deficits, address strength deficits, analyze and address soft tissue restrictions and analyze and modify body mechanics/ergonomics to attain remaining goals.             Progress towards goals / Updated goals:  Short Term Goals: To be accomplished in  2  treatments:  1. Pt will be compliant with HEP for symptom management at home.   Pt reports partial compliance with HEP 11/05  Long Term Goals: To be accomplished in  10  treatments:  1. Pt will demonstrate an increased FOTO score to 54/100 in order to improve function. 2. Pt will be independent with HEP at D/C for self management. 3. Pt will be able to ambulate 250ft continuously without AD/LE buckling/LOB/rest break in order to return to community ambulation and her daily walking regimen. Walks in the house without AD   4. Pt will demonstrate increased left knee flexion and extension strength to > 4+/5 in order to assist with ambulation and transfers  5. Pt will demonstrate a proper squat and lift of 10lbs without valgus collapse in order to lift groceries without pain. PLAN  [x]  Upgrade activities as tolerated     [x]  Continue plan of care  []  Update interventions per flow sheet       []  Discharge due to:_  []  Other:_      Huang Cuevas, PT 11/26/2019  2:25 PM    No future appointments.

## 2019-11-27 NOTE — PROGRESS NOTES
[facility]  [place] [facility]  [address] [place] [address]  [phone] fax    Continued Plan of Care/ Re-certification for Physical Therapy Services  Patient name: Irma Alvarado Start of Care: 2019   Referral source: Steff Luevano MD : [id number]               Medical Diagnosis: Unilateral primary osteoarthritis, left knee [M17.12]  Pain in left knee [M25.562]  Payor: [facility] MEDICAID / [facility]: [facility] Type: Managed Care Medicaid /  Onset Date:2019   Treatment Diagnosis: left knee pain   Prior Hospitalization: see medical history Provider#: [id number]   Medications: Verified on Patient summary List    Comorbidities: depression, OA, HTN   Prior Level of Function: I ambulation   Visits from Start of Care: 7    Missed Visits: 4    The Plan of Care and following information is based on the patient's current status:  Goal:Pt will demonstrate an increased FOTO score to 54/100 in order to improve function. Status at last note/certification:38/100  Current Status: not met    Goal:Pt will be independent with HEP at D/C for self management. Status at last note/certification: final HEP not established   Current Status: not met    Goal:Pt will be able to ambulate 250ft continuously without AD/LE buckling/LOB/rest break in order to return to community ambulation and her daily walking regimen. Status at last note/certification:everyday has buckling sensation  Current Status: not met    Goal:will demonstrate increased left knee flexion and extension strength to > 4+/5 in order to assist with ambulation and transfers  Status at last note/certification:3+/5  Current Status: not met    Goal:Pt will demonstrate a proper squat and lift of 10lbs without valgus collapse in order to lift groceries without pain.   Status at last note/certification: valgus collapse  Current status:Met    Key functional changes: limited improvement     Problems/ barriers to goal attainment: poor attendance to therapy and poor compliance with HEP     Problem List: pain affecting function, decrease strength, impaired gait/ balance, decrease ADL/ functional abilitiies and decrease activity tolerance    Treatment Plan: Therapeutic exercise, Therapeutic activities, Neuromuscular re-education, Physical agent/modality, Gait/balance training, Manual therapy and Patient education     Patient Goal (s) has been updated and includes: *reduce knee pain **     Goals for this certification period to be accomplished in 4 weeks:  1. Pt will demonstrate an increased FOTO score to 54/100 in order to improve function. 2. Pt will be independent with HEP at D/C for self management. 3. Pt will be able to ambulate 250ft continuously without AD/LE buckling/LOB/rest break in order to return to community ambulation and her daily walking regimen  4. Pt will demonstrate increased left knee flexion and extension strength to > 4+/5 in order to assist with ambulation and transfers        Frequency / Duration: Patient to be seen 2 times per week for 4 weeks:    Assessment / Recommendations:Patient is making slow, limited progress towards goals due to poor attendance to therapy and poor compliance with HEP. Pt states she has missed several appointments due to illness and health concerns. She reports no significant change in left knee pain. She presents with decreased left knee strength; extension 4/5 and flexion 4-/5. She ambulates inside her home without AD, but uses [facility] for short community distances. Pt's FOTO score improved by 12 pts indicating some functional progress. Patient will continue to benefit from skilled PT to address strength, ROM, and flexibility in order to reduce pain with ambulation and increase ease of ADL's.     Certification Period: 11/26/ 2019 to 12/25/2019    Efren Hsieh, PT 11/27/2019 9:49 AM    ________________________________________________________________________  I certify that the above Therapy Services are being furnished while the patient is under my care. I agree with the treatment plan and certify that this therapy is necessary. [] I have read the above and request that my patient continue as recommended.   [] I have read the above report and request that my patient continue therapy with the following changes/special instructions: _______________________________________  [] I have read the above report and request that my patient be discharged from therapy    Physician's Signature:____________Date:_________TIME:________    ** Signature, Date and Time must be completed for valid certification **    Please sign and return to [facility]  [facility] LATONYA Rodriguez MARIANN  [address] [place] [address]  [phone] fax

## 2019-12-03 ENCOUNTER — APPOINTMENT (OUTPATIENT)
Dept: CT IMAGING | Age: 69
End: 2019-12-03
Attending: EMERGENCY MEDICINE
Payer: MEDICARE

## 2019-12-03 ENCOUNTER — HOSPITAL ENCOUNTER (EMERGENCY)
Age: 69
Discharge: HOME | End: 2019-12-03
Attending: EMERGENCY MEDICINE
Payer: MEDICARE

## 2019-12-03 ENCOUNTER — APPOINTMENT (OUTPATIENT)
Dept: PHYSICAL THERAPY | Age: 69
End: 2019-12-03
Payer: MEDICARE

## 2019-12-03 VITALS
SYSTOLIC BLOOD PRESSURE: 157 MMHG | BODY MASS INDEX: 25.61 KG/M2 | WEIGHT: 150 LBS | HEART RATE: 91 BPM | HEIGHT: 64 IN | DIASTOLIC BLOOD PRESSURE: 83 MMHG | OXYGEN SATURATION: 98 % | TEMPERATURE: 98.1 F

## 2019-12-03 DIAGNOSIS — R42 LIGHT HEADEDNESS: Primary | ICD-10-CM

## 2019-12-03 DIAGNOSIS — R53.1 WEAKNESS: ICD-10-CM

## 2019-12-03 DIAGNOSIS — R42 VERTIGO: ICD-10-CM

## 2019-12-03 DIAGNOSIS — N30.00 ACUTE CYSTITIS WITHOUT HEMATURIA: ICD-10-CM

## 2019-12-03 LAB
ALBUMIN SERPL-MCNC: 3.8 G/DL (ref 3.4–5)
ALBUMIN/GLOB SERPL: 0.8 {RATIO} (ref 0.8–1.7)
ALP SERPL-CCNC: 95 U/L (ref 45–117)
ALT SERPL-CCNC: 23 U/L (ref 13–56)
ANION GAP SERPL CALC-SCNC: 7 MMOL/L (ref 3–18)
APPEARANCE UR: ABNORMAL
AST SERPL-CCNC: 14 U/L (ref 10–38)
ATRIAL RATE: 75 BPM
BACTERIA URNS QL MICRO: ABNORMAL /HPF
BASOPHILS # BLD: 0 K/UL (ref 0–0.1)
BASOPHILS NFR BLD: 0 % (ref 0–2)
BILIRUB SERPL-MCNC: 0.3 MG/DL (ref 0.2–1)
BILIRUB UR QL: NEGATIVE
BUN SERPL-MCNC: 10 MG/DL (ref 7–18)
BUN/CREAT SERPL: 18 (ref 12–20)
CALCIUM SERPL-MCNC: 9.8 MG/DL (ref 8.5–10.1)
CALCULATED P AXIS, ECG09: 47 DEGREES
CALCULATED R AXIS, ECG10: 20 DEGREES
CALCULATED T AXIS, ECG11: 43 DEGREES
CHLORIDE SERPL-SCNC: 106 MMOL/L (ref 100–111)
CO2 SERPL-SCNC: 28 MMOL/L (ref 21–32)
COLOR UR: YELLOW
CREAT SERPL-MCNC: 0.55 MG/DL (ref 0.6–1.3)
DIAGNOSIS, 93000: NORMAL
DIFFERENTIAL METHOD BLD: ABNORMAL
EOSINOPHIL # BLD: 0 K/UL (ref 0–0.4)
EOSINOPHIL NFR BLD: 1 % (ref 0–5)
EPITH CASTS URNS QL MICRO: ABNORMAL /LPF (ref 0–5)
ERYTHROCYTE [DISTWIDTH] IN BLOOD BY AUTOMATED COUNT: 15.2 % (ref 11.6–14.5)
GLOBULIN SER CALC-MCNC: 4.9 G/DL (ref 2–4)
GLUCOSE SERPL-MCNC: 101 MG/DL (ref 74–99)
GLUCOSE UR STRIP.AUTO-MCNC: NEGATIVE MG/DL
HCT VFR BLD AUTO: 37 % (ref 35–45)
HGB BLD-MCNC: 12.8 G/DL (ref 12–16)
HGB UR QL STRIP: NEGATIVE
KETONES UR QL STRIP.AUTO: ABNORMAL MG/DL
LEUKOCYTE ESTERASE UR QL STRIP.AUTO: ABNORMAL
LYMPHOCYTES # BLD: 1.5 K/UL (ref 0.9–3.6)
LYMPHOCYTES NFR BLD: 31 % (ref 21–52)
MAGNESIUM SERPL-MCNC: 2 MG/DL (ref 1.6–2.6)
MCH RBC QN AUTO: 29.4 PG (ref 24–34)
MCHC RBC AUTO-ENTMCNC: 34.6 G/DL (ref 31–37)
MCV RBC AUTO: 85.1 FL (ref 74–97)
MONOCYTES # BLD: 0.5 K/UL (ref 0.05–1.2)
MONOCYTES NFR BLD: 10 % (ref 3–10)
MUCOUS THREADS URNS QL MICRO: ABNORMAL /LPF
NEUTS SEG # BLD: 2.9 K/UL (ref 1.8–8)
NEUTS SEG NFR BLD: 58 % (ref 40–73)
NITRITE UR QL STRIP.AUTO: NEGATIVE
P-R INTERVAL, ECG05: 124 MS
PH UR STRIP: 5 [PH] (ref 5–8)
PLATELET # BLD AUTO: 191 K/UL (ref 135–420)
PMV BLD AUTO: 10.7 FL (ref 9.2–11.8)
POTASSIUM SERPL-SCNC: 3.5 MMOL/L (ref 3.5–5.5)
PROT SERPL-MCNC: 8.7 G/DL (ref 6.4–8.2)
PROT UR STRIP-MCNC: NEGATIVE MG/DL
Q-T INTERVAL, ECG07: 370 MS
QRS DURATION, ECG06: 78 MS
QTC CALCULATION (BEZET), ECG08: 413 MS
RBC # BLD AUTO: 4.35 M/UL (ref 4.2–5.3)
RBC #/AREA URNS HPF: NEGATIVE /HPF (ref 0–5)
SODIUM SERPL-SCNC: 141 MMOL/L (ref 136–145)
SP GR UR REFRACTOMETRY: 1.02 (ref 1–1.03)
UROBILINOGEN UR QL STRIP.AUTO: 0.2 EU/DL (ref 0.2–1)
VENTRICULAR RATE, ECG03: 75 BPM
WBC # BLD AUTO: 5 K/UL (ref 4.6–13.2)
WBC URNS QL MICRO: ABNORMAL /HPF (ref 0–4)

## 2019-12-03 PROCEDURE — 99282 EMERGENCY DEPT VISIT SF MDM: CPT

## 2019-12-03 PROCEDURE — 83735 ASSAY OF MAGNESIUM: CPT

## 2019-12-03 PROCEDURE — 85025 COMPLETE CBC W/AUTO DIFF WBC: CPT

## 2019-12-03 PROCEDURE — 80053 COMPREHEN METABOLIC PANEL: CPT

## 2019-12-03 PROCEDURE — 70450 CT HEAD/BRAIN W/O DYE: CPT

## 2019-12-03 PROCEDURE — 93005 ELECTROCARDIOGRAM TRACING: CPT

## 2019-12-03 PROCEDURE — 81001 URINALYSIS AUTO W/SCOPE: CPT

## 2019-12-03 RX ORDER — MECLIZINE HCL 12.5 MG 12.5 MG/1
12.5 TABLET ORAL
Qty: 21 TAB | Refills: 0 | Status: SHIPPED | OUTPATIENT
Start: 2019-12-03 | End: 2019-12-13

## 2019-12-03 RX ORDER — MECLIZINE HCL 12.5 MG 12.5 MG/1
25 TABLET ORAL
Status: DISCONTINUED | OUTPATIENT
Start: 2019-12-03 | End: 2019-12-03 | Stop reason: HOSPADM

## 2019-12-03 RX ORDER — NITROFURANTOIN 25; 75 MG/1; MG/1
100 CAPSULE ORAL 2 TIMES DAILY
Qty: 14 CAP | Refills: 0 | Status: SHIPPED | OUTPATIENT
Start: 2019-12-03 | End: 2019-12-10

## 2019-12-03 NOTE — ED PROVIDER NOTES
Patient is a 21-year-old female with past medical history of hypertension, hyperlipidemia depression who presents for persistent lightheadedness and generalized weakness. Patient states that for the last several months she has had persistent lightheadedness with changes in position such as standing or exertion as well as constant overall generalized weakness. Patient states that she has generalized abdominal pain that is currently being worked up by gastroenterologist.  She states that she does not eat as much as she used to due to her abdominal pain. No acute exacerbation in her pain. she states that she has not experienced an acute exacerbation this;however, she has been seen here multiple times and her symptoms are still persistent. She was last seen here on the 24th presenting with lightheadedness following viral URI. She is also been treated for possible UTI with similar symptoms for. She denies any headache, chest pain, shortness of breath, urinary symptoms, numbness, weakness she denies any traumatic event. She denies any syncope. Past Medical History:   Diagnosis Date    Depression     High cholesterol     Hypertension        No past surgical history on file. No family history on file.     Social History     Socioeconomic History    Marital status: SINGLE     Spouse name: Not on file    Number of children: Not on file    Years of education: Not on file    Highest education level: Not on file   Occupational History    Not on file   Social Needs    Financial resource strain: Not on file    Food insecurity:     Worry: Not on file     Inability: Not on file    Transportation needs:     Medical: Not on file     Non-medical: Not on file   Tobacco Use    Smoking status: Never Smoker    Smokeless tobacco: Never Used   Substance and Sexual Activity    Alcohol use: Not Currently    Drug use: Never    Sexual activity: Not Currently   Lifestyle    Physical activity:     Days per week: Not on file     Minutes per session: Not on file    Stress: Not on file   Relationships    Social connections:     Talks on phone: Not on file     Gets together: Not on file     Attends Restorationism service: Not on file     Active member of club or organization: Not on file     Attends meetings of clubs or organizations: Not on file     Relationship status: Not on file    Intimate partner violence:     Fear of current or ex partner: Not on file     Emotionally abused: Not on file     Physically abused: Not on file     Forced sexual activity: Not on file   Other Topics Concern    Not on file   Social History Narrative    Not on file         ALLERGIES: Aleve [naproxen sodium]; Bactrim [sulfamethoprim]; Diphenhydramine; Feldene [piroxicam]; and Penicillins    Review of Systems   Constitutional: Negative for chills, fatigue and fever. Respiratory: Negative for chest tightness and shortness of breath. Cardiovascular: Positive for leg swelling. Negative for chest pain and palpitations. Gastrointestinal: Positive for abdominal pain and nausea. Negative for diarrhea and vomiting. Neurological: Positive for light-headedness. Negative for syncope, weakness and numbness. Vitals:    12/03/19 1034   BP: 157/83   Pulse: 91   Temp: 98.1 °F (36.7 °C)   SpO2: 98%   Weight: 68 kg (150 lb)   Height: 5' 4\" (1.626 m)            Physical Exam  Vitals signs and nursing note reviewed. Constitutional:       General: She is awake. She is not in acute distress. Appearance: Normal appearance. She is well-developed and normal weight. She is not ill-appearing, toxic-appearing or diaphoretic. Eyes:      Extraocular Movements: Extraocular movements intact. Pupils: Pupils are equal, round, and reactive to light. Cardiovascular:      Rate and Rhythm: Normal rate. Pulses:           Radial pulses are 2+ on the right side and 2+ on the left side. Heart sounds: Normal heart sounds.       Comments: Patient is wearing bilateral compression stockings  Pulmonary:      Effort: Pulmonary effort is normal.      Breath sounds: Normal breath sounds. Abdominal:      Tenderness: There is generalized tenderness. Neurological:      General: No focal deficit present. Mental Status: She is alert and easily aroused. Cranial Nerves: Cranial nerves are intact. Sensory: Sensation is intact. Motor: Motor function is intact. Coordination: Coordination is intact. Gait: Gait is intact. Comments: Patient becomes slightly lightheaded upon standing test Romberg   Psychiatric:         Behavior: Behavior is cooperative. MDM  Number of Diagnoses or Management Options  Light headedness:   Vertigo:   Weakness:   Diagnosis management comments: Patient is a 21-year-old female with past medical history of hypertension, hypercholesterolemia and depression as well as long-standing lightheadedness and generalized weakness who presented to the ED with persistence of the symptoms. Differential diagnosis was broad to include acute neurovascular process versus electrolyte abnormality versus metabolic or nutritional abnormality versus anemia versus orthostatic because of lightheadedness. Patient was hemodynamically stable on presentation. Physical exam was only positive for slight sensation of lightheadedness when patient was told to stand for Romberg test.  She has had many CBCs in the past with the most recent on 11/24/2019 all of which have shown a stable hemoglobin. H&H stable today as well. No gross electrolyte abnormalities. BUN/creatinine ratio less than 20 patient states that she consistently drinks water. She did not appear to have dry mucous membranes on examination. EKG negative for any dysrhythmias or acute ischemic changes. CT scan of the head was ordered as this had not been done previously her complaint has persisted with no other cause yet determined.   Patient was initially agreeable and then decided she did not want to get this done because \"she lives by herself and is under a lot of stress and does not want bad news\" as explained to the patient that this test was meant as further evaluation that we deem necessary for her persistent symptoms. CT scan of the head was eventually performed she agreed to it was found to be negative for any acute process. She was reevaluated by attending and felt as though a peripheral vertigo may be at play as well. She was discharged home with meclizine and states she has close follow-up with an ENT specialist.  She is instructed to keep this appointment. Incidentally, the patient unknowingly provided a urine sample which was grossly positive. Though she denied any symptoms many blood cells and sample. Attending will contact patient and put in prescription for antibiotic.          Procedures

## 2019-12-03 NOTE — ED TRIAGE NOTES
Patient arrived via triage stating \"I am feeling weak like I might pass out and like I don't feel good. \"

## 2019-12-03 NOTE — DISCHARGE INSTRUCTIONS
Patient Education        Dizziness: Care Instructions  Your Care Instructions  Dizziness is the feeling of unsteadiness or fuzziness in your head. It is different than having vertigo, which is a feeling that the room is spinning or that you are moving or falling. It is also different from lightheadedness, which is the feeling that you are about to faint. It can be hard to know what causes dizziness. Some people feel dizzy when they have migraine headaches. Sometimes bouts of flu can make you feel dizzy. Some medical conditions, such as heart problems or high blood pressure, can make you feel dizzy. Many medicines can cause dizziness, including medicines for high blood pressure, pain, or anxiety. If a medicine causes your symptoms, your doctor may recommend that you stop or change the medicine. If it is a problem with your heart, you may need medicine to help your heart work better. If there is no clear reason for your symptoms, your doctor may suggest watching and waiting for a while to see if the dizziness goes away on its own. Follow-up care is a key part of your treatment and safety. Be sure to make and go to all appointments, and call your doctor if you are having problems. It's also a good idea to know your test results and keep a list of the medicines you take. How can you care for yourself at home? · If your doctor recommends or prescribes medicine, take it exactly as directed. Call your doctor if you think you are having a problem with your medicine. · Do not drive while you feel dizzy. · Try to prevent falls. Steps you can take include:  ? Using nonskid mats, adding grab bars near the tub, and using night-lights. ? Clearing your home so that walkways are free of anything you might trip on.  ? Letting family and friends know that you have been feeling dizzy. This will help them know how to help you. When should you call for help? Call 911 anytime you think you may need emergency care.  For example, call if:    · You passed out (lost consciousness).     · You have dizziness along with symptoms of a heart attack. These may include:  ? Chest pain or pressure, or a strange feeling in the chest.  ? Sweating. ? Shortness of breath. ? Nausea or vomiting. ? Pain, pressure, or a strange feeling in the back, neck, jaw, or upper belly or in one or both shoulders or arms. ? Lightheadedness or sudden weakness. ? A fast or irregular heartbeat.     · You have symptoms of a stroke. These may include:  ? Sudden numbness, tingling, weakness, or loss of movement in your face, arm, or leg, especially on only one side of your body. ? Sudden vision changes. ? Sudden trouble speaking. ? Sudden confusion or trouble understanding simple statements. ? Sudden problems with walking or balance. ? A sudden, severe headache that is different from past headaches.    Call your doctor now or seek immediate medical care if:    · You feel dizzy and have a fever, headache, or ringing in your ears.     · You have new or increased nausea and vomiting.     · Your dizziness does not go away or comes back.    Watch closely for changes in your health, and be sure to contact your doctor if:    · You do not get better as expected. Where can you learn more? Go to http://bri-khari.info/. Enter Y498 in the search box to learn more about \"Dizziness: Care Instructions. \"  Current as of: June 26, 2019  Content Version: 12.2  © 6571-0286 [employer]. Care instructions adapted under license by [employer] (which disclaims liability or warranty for this information). If you have questions about a medical condition or this instruction, always ask your healthcare professional. [place] [address] any warranty or liability for your use of this information.          Patient Education        Vertigo: Care Instructions  Your Care Instructions    Vertigo is the feeling that you or your surroundings are moving when there is no actual movement. It is often described as a feeling of spinning, whirling, falling, or tilting. Vertigo may make you vomit or feel nauseated. You may have trouble standing or walking and may lose your balance. Vertigo is often related to an inner ear problem, but it can have other more serious causes. If vertigo continues, you may need more tests to find its cause. Follow-up care is a key part of your treatment and safety. Be sure to make and go to all appointments, and call your doctor if you are having problems. It's also a good idea to know your test results and keep a list of the medicines you take. How can you care for yourself at home? · Do not lie flat on your back. Prop yourself up slightly. This may reduce the spinning feeling. Keep your eyes open. · Move slowly so that you do not fall. · If your doctor recommends medicine, take it exactly as directed. · Do not drive while you are having vertigo. Certain exercises, called Brown-Daroff exercises, can help decrease vertigo. To do Brown-Daroff exercises:  · Sit on the edge of a bed or sofa and quickly lie down on the side that causes the worst vertigo. Lie on your side with your ear down. · Stay in this position for at least 30 seconds or until the vertigo goes away. · Sit up. If this causes vertigo, wait for it to stop. · Repeat the procedure on the other side. · Repeat this 10 times. Do these exercises 2 times a day until the vertigo is gone. When should you call for help? Call 911 anytime you think you may need emergency care. For example, call if:    · You passed out (lost consciousness).     · You have symptoms of a stroke. These may include:  ? Sudden numbness, tingling, weakness, or loss of movement in your face, arm, or leg, especially on only one side of your body. ? Sudden vision changes. ? Sudden trouble speaking. ? Sudden confusion or trouble understanding simple statements.   ? Sudden problems with walking or balance. ? A sudden, severe headache that is different from past headaches.    Call your doctor now or seek immediate medical care if:    · Vertigo occurs with a fever, a headache, or ringing in your ears.     · You have new or increased nausea and vomiting.    Watch closely for changes in your health, and be sure to contact your doctor if:    · Vertigo gets worse or happens more often.     · Vertigo has not gotten better after 2 weeks. Where can you learn more? Go to http://bri-khari.info/. Enter H076 in the search box to learn more about \"Vertigo: Care Instructions. \"  Current as of: October 21, 2018  Content Version: 12.2  © 2881-9643 [employer]. Care instructions adapted under license by [employer] (which disclaims liability or warranty for this information). If you have questions about a medical condition or this instruction, always ask your healthcare professional. [place] [address] any warranty or liability for your use of this information. Patient Education        Vertigo: Exercises  Introduction  Here are some examples of exercises for you to try. The exercises may be suggested for a condition or for rehabilitation. Start each exercise slowly. Ease off the exercises if you start to have pain. You will be told when to start these exercises and which ones will work best for you. How to do the exercises  Exercise 1    1. Stand with a chair in front of you and a wall behind you. If you begin to fall, you may use them for support. 2. Stand with your feet together and your arms at your sides. 3. Move your head up and down 10 times. Exercise 2    1. Move your head side to side 10 times. Exercise 3    1. Move your head diagonally up and down 10 times. Exercise 4    1. Move your head diagonally up and down 10 times on the other side. Follow-up care is a key part of your treatment and safety.  Be sure to make and go to all appointments, and call your doctor if you are having problems. It's also a good idea to know your test results and keep a list of the medicines you take. Where can you learn more? Go to http://bri-khari.info/. Enter F349 in the search box to learn more about \"Vertigo: Exercises. \"  Current as of: October 21, 2018  Content Version: 12.2  © 3812-2959 [employer]. Care instructions adapted under license by [employer] (which disclaims liability or warranty for this information). If you have questions about a medical condition or this instruction, always ask your healthcare professional. [address] any warranty or liability for your use of this information. Patient Education        Weakness: Care Instructions  Your Care Instructions    Weakness is a lack of physical or muscle strength. You may feel that you need to make extra effort to move your arms, legs, or other muscles. Generalized weakness means that you feel weak in most areas of your body. Another type of weakness may affect just one muscle or group of muscles. You may feel weak and tired after you have done too much activity, such as taking an extra-long hike. This is not a serious problem. It often goes away on its own. Feeling weak can also be caused by medical conditions like thyroid problems, depression, or a virus. Sometimes the cause can be serious. Your doctor may want to do more tests to try to find the cause of the weakness. The doctor has checked you carefully, but problems can develop later. If you notice any problems or new symptoms, get medical treatment right away. Follow-up care is a key part of your treatment and safety. Be sure to make and go to all appointments, and call your doctor if you are having problems. It's also a good idea to know your test results and keep a list of the medicines you take. How can you care for yourself at home?   · Rest when you feel tired. · Be safe with medicines. If your doctor prescribed medicine, take it exactly as prescribed. Call your doctor if you think you are having a problem with your medicine. You will get more details on the specific medicines your doctor prescribes. · Do not skip meals. Eating a balanced diet may increase your energy level. · Get some physical activity every day, but do not get too tired. When should you call for help? Call your doctor now or seek immediate medical care if:    · You have new or worse weakness.     · You are dizzy or lightheaded, or you feel like you may faint.    Watch closely for changes in your health, and be sure to contact your doctor if:    · You do not get better as expected. Where can you learn more? Go to http://bri-kahri.info/. Enter 187 [id number] [id number] in the search box to learn more about \"Weakness: Care Instructions. \"  Current as of: June 26, 2019  Content Version: 12.2  © 5097-1968 [employer]. Care instructions adapted under license by [employer] (which disclaims liability or warranty for this information). If you have questions about a medical condition or this instruction, always ask your healthcare professional. [address] any warranty or liability for your use of this information.

## 2019-12-05 ENCOUNTER — APPOINTMENT (OUTPATIENT)
Dept: PHYSICAL THERAPY | Age: 69
End: 2019-12-05
Payer: MEDICARE

## 2019-12-10 ENCOUNTER — APPOINTMENT (OUTPATIENT)
Dept: PHYSICAL THERAPY | Age: 69
End: 2019-12-10
Payer: MEDICARE

## 2019-12-12 ENCOUNTER — APPOINTMENT (OUTPATIENT)
Dept: PHYSICAL THERAPY | Age: 69
End: 2019-12-12
Payer: MEDICARE

## 2019-12-17 ENCOUNTER — HOSPITAL ENCOUNTER (OUTPATIENT)
Dept: PHYSICAL THERAPY | Age: 69
Discharge: HOME | End: 2019-12-17
Payer: MEDICARE

## 2019-12-17 PROCEDURE — 97530 THERAPEUTIC ACTIVITIES: CPT

## 2019-12-17 PROCEDURE — 97110 THERAPEUTIC EXERCISES: CPT

## 2019-12-17 NOTE — PROGRESS NOTES
PT DAILY TREATMENT NOTE 10-18    Patient Name: Solis Decker  Date:2019  : 1950  [x]  Patient  Verified  Payor: BLUE CROSS MEDICARE / Plan: [address] [place], [place] [address]ina Gracey HMO / Product Type: Managed Care Medicare /    In time:2:33  Out time:3:10   Total Treatment Time (min): 37 (-10 minutes for pt discussing lift stressors = 27 minutes)  Visit #: 1 of 8    Medicare/BCBS Only   Total Timed Codes (min):  27 1:1 Treatment Time:  27       Treatment Area: Pain in left knee [M25.562]  Unilateral primary osteoarthritis, left knee [M17.12]    SUBJECTIVE  Pain Level (0-10 scale): 4/10  Any medication changes, allergies to medications, adverse drug reactions, diagnosis change, or new procedure performed?: [x] No    [] Yes (see summary sheet for update)  Subjective functional status/changes:   [] No changes reported  Pt reports that she has been missing therapy because of some stomach issues and life stressors. She saw a specialist the other day and they are scheduling her for a colonoscopy to try to see what is causing her sx. She reports she has a poor appetite. She has been trying to do more walking. Pt reports that her left knee is better, but she needs to be able to walk farther. She is able to walk ~4 blocks to a store but she has a lot of pain afterwards if she walks that far. She is having problems with her transportation as the transportation that she books through her insurance is frequently late. Pt reports that she has been constantly dizzy since she was discharged from the hospital.  She was diagnosed with vertigo. The dizziness is getting better, but she's following up with an ENT in January. Her depression is not managed right now. She follows up with the behavioral health clinic tomorrow, and she is going to ask them about changing her anti-depressant medication.      OBJECTIVE      14 min Therapeutic Exercise:  [x] See flow sheet :   Rationale: increase ROM and increase strength to improve the patients ability to improve ease of ambulation and daily tasks    13 minutes Therapeutic Activity: patient education  Rationale: to ensure pt understanding/compliance           With   [] TE   [x] TA   [] neuro   [] other: Patient Education: [x] Review HEP    [] Progressed/Changed HEP based on:   [] positioning   [] body mechanics   [] transfers   [] heat/ice application    [x] other: reviewed CX/NS policy and informed pt she would be DC if she missed another appointment, discussion regarding importance of therapy attendance, discussion of possible referral to physical therapy for dizziness pending ENT recommendation after follow up, discussion of community resources      Other Objective/Functional Measures:     Pt reported challenge with clams without resistance   Pt reported challenge with LAQ with 3# weight  Discomfort with LAQ on left, discomfort always subsided immediately upon ceasing   Gave pt handouts with local transportation contacts     Pt began crying during session, reporting she has so much going on in her life right now and she doesn't know how to handle everything  Gave pt information on senior services and local charities to assist with her concerns     Pain Level (0-10 scale) post treatment: 4/10     ASSESSMENT/Changes in Function:     Pt is making limited progress in therapy secondary to lack of therapy attendance. A large portion of session was spent discussing importance of therapy attendance to optimize therapy outcomes. Provided pt with resources to assist with transportation barriers with attendance. Also gave pt option to decrease therapy frequency with increased HEP, but she would like to try therapy for a few more sessions with consistent attendance.   Will continue to address strength deficits to improve ease/ability of prolonged ambulation to perform grocery shopping and increase ease of daily tasks    Patient will continue to benefit from skilled PT services to modify and progress therapeutic interventions, address ROM deficits, address strength deficits, analyze and address soft tissue restrictions, analyze and cue movement patterns, analyze and modify body mechanics/ergonomics, assess and modify postural abnormalities, address imbalance/dizziness and instruct in home and community integration to attain remaining goals. Progress towards goals / Updated goals:  1. Pt will demonstrate an increased FOTO score to 54/100 in order to improve function. 2. Pt will be independent with HEP at D/C for self management.   3. Pt will be able to ambulate 250ft continuously without AD/LE buckling/LOB/rest break in order to return to community ambulation and her daily walking regimen  4. Pt will demonstrate increased left knee flexion and extension strength to > 4+/5 in order to assist with ambulation and transfers    PLAN  [x]  Upgrade activities as tolerated     [x]  Continue plan of care  []  Update interventions per flow sheet       []  Discharge due to:_  []  Other:_      Zaida Flores, PT 12/17/2019  2:37 PM    Future Appointments   Date Time Provider Bharat Thakkar   12/19/2019  2:30 PM Kady Urena PTA [facility]

## 2019-12-19 ENCOUNTER — HOSPITAL ENCOUNTER (OUTPATIENT)
Dept: PHYSICAL THERAPY | Age: 69
Discharge: HOME | End: 2019-12-19
Payer: MEDICARE

## 2019-12-19 PROCEDURE — 97110 THERAPEUTIC EXERCISES: CPT

## 2019-12-19 NOTE — PROGRESS NOTES
PT DAILY TREATMENT NOTE 10-18    Patient Name: Maral Robles  Date:2019  : 1950  [x]  Patient  Verified  Payor: BLUE CROSS MEDICARE / Plan: [address] [place] [address] HMO / Product Type: Managed Care Medicare /    In time:230  Out time:315  Total Treatment Time (min): 39  Visit #: 2 of 8    Medicare/BCBS Only   Total Timed Codes (min):  35 1:1 Treatment Time:  35       Treatment Area: Pain in left knee [M25.562]  Unilateral primary osteoarthritis, left knee [M17.12]    SUBJECTIVE  Pain Level (0-10 scale): 4/10  Any medication changes, allergies to medications, adverse drug reactions, diagnosis change, or new procedure performed?: [x] No    [] Yes (see summary sheet for update)  Subjective functional status/changes:   [] No changes reported  Pt stated that her knee is about the same    OBJECTIVE    Modality rationale: decrease pain and increase tissue extensibility to improve the patients ability to increase ease with walking   Min Type Additional Details    [] Estim:  []Unatt       []IFC  []Premod                        []Other:  []w/ice   []w/heat  Position:  Location:    [] Estim: []Att    []TENS instruct  []NMES                    []Other:  []w/US   []w/ice   []w/heat  Position:  Location:    []  Traction: [] Cervical       []Lumbar                       [] Prone          []Supine                       []Intermittent   []Continuous Lbs:  [] before manual  [] after manual    []  Ultrasound: []Continuous   [] Pulsed                           []1MHz   []3MHz W/cm2:  Location:    []  Iontophoresis with dexamethasone         Location: [] Take home patch   [] In clinic   10 []  Ice     [x]  heat  []  Ice massage  []  Laser   []  Anodyne Position:supine  Location:left knee    []  Laser with stim  []  Other:  Position:  Location:    []  Vasopneumatic Device Pressure:       [] lo [] med [] hi   Temperature: [] lo [] med [] hi   [x] Skin assessment post-treatment:  [x]intact []redness- no adverse reaction    []redness  adverse reaction:     35 min Therapeutic Exercise:  [x] See flow sheet :   Rationale: increase ROM and increase strength to improve the patients ability to increase ease with ADLs    With   [x] TE   [] TA   [] neuro   [] other: Patient Education: [x] Review HEP    [] Progressed/Changed HEP based on:   [] positioning   [] body mechanics   [] transfers   [] heat/ice application    [] other:      Other Objective/Functional Measures:   Had no difficulty with exercises  No complaint of increased left knee pain during session   Used good form with minimal cueing     Pain Level (0-10 scale) post treatment: 3/10    ASSESSMENT/Changes in Function:   Pt is making limited progress toward goals. Pt cont with moderate pain in the left knee. Pt cont to have difficulty with performing ADLs. Cont with decreased standing and walking tolerance. Strength and range of motion cont to be decreases    Patient will continue to benefit from skilled PT services to modify and progress therapeutic interventions, address functional mobility deficits, address ROM deficits, address strength deficits, analyze and modify body mechanics/ergonomics, address imbalance/dizziness and instruct in home and community integration to attain remaining goals. []  See Plan of Care  [x]  See progress note/recertification  []  See Discharge Summary         Progress towards goals / Updated goals:  1. Pt will demonstrate an increased FOTO score to 54/100 in order to improve function. 2. Pt will be independent with HEP at D/C for self management. 3. Pt will be able to ambulate 250ft continuously without AD/LE buckling/LOB/rest break in order to return to community ambulation and her daily walking regimen  Not met.  12/19/19  4. Pt will demonstrate increased left knee flexion and extension strength to > 4+/5 in order to assist with ambulation and transfers    PLAN  []  Upgrade activities as tolerated     [x]  Continue plan of care  [] Update interventions per flow sheet       []  Discharge due to:_  []  Other:_      Mendoza Pugh, EMMA 12/19/2019  2:29 PM    Future Appointments   Date Time Provider Bharat Thakkar   12/19/2019  2:30 PM Ryan Simmons [facility]   12/30/2019 11:00 AM Dariusz Rivera, PT [facility]   1/2/2020  2:30 PM Neftaly Grullon PTA [facility]

## 2019-12-24 ENCOUNTER — HOSPITAL ENCOUNTER (EMERGENCY)
Age: 69
Discharge: HOME | End: 2019-12-25
Attending: EMERGENCY MEDICINE
Payer: MEDICAID

## 2019-12-24 ENCOUNTER — APPOINTMENT (OUTPATIENT)
Dept: GENERAL RADIOLOGY | Age: 69
End: 2019-12-24
Attending: EMERGENCY MEDICINE
Payer: MEDICAID

## 2019-12-24 VITALS
OXYGEN SATURATION: 97 % | TEMPERATURE: 98 F | SYSTOLIC BLOOD PRESSURE: 143 MMHG | RESPIRATION RATE: 18 BRPM | HEART RATE: 81 BPM | DIASTOLIC BLOOD PRESSURE: 83 MMHG

## 2019-12-24 DIAGNOSIS — R53.83 FATIGUE, UNSPECIFIED TYPE: Primary | ICD-10-CM

## 2019-12-24 LAB
ALBUMIN SERPL-MCNC: 3.6 G/DL (ref 3.4–5)
ALBUMIN/GLOB SERPL: 0.7 {RATIO} (ref 0.8–1.7)
ALP SERPL-CCNC: 99 U/L (ref 45–117)
ALT SERPL-CCNC: 99 U/L (ref 13–56)
ANION GAP SERPL CALC-SCNC: 6 MMOL/L (ref 3–18)
APPEARANCE UR: CLEAR
AST SERPL-CCNC: 57 U/L (ref 10–38)
BACTERIA URNS QL MICRO: ABNORMAL /HPF
BASOPHILS # BLD: 0 K/UL (ref 0–0.1)
BASOPHILS NFR BLD: 1 % (ref 0–2)
BILIRUB SERPL-MCNC: 0.1 MG/DL (ref 0.2–1)
BILIRUB UR QL: NEGATIVE
BUN SERPL-MCNC: 14 MG/DL (ref 7–18)
BUN/CREAT SERPL: 21 (ref 12–20)
CALCIUM SERPL-MCNC: 9.8 MG/DL (ref 8.5–10.1)
CHLORIDE SERPL-SCNC: 103 MMOL/L (ref 100–111)
CO2 SERPL-SCNC: 29 MMOL/L (ref 21–32)
COLOR UR: YELLOW
CREAT SERPL-MCNC: 0.68 MG/DL (ref 0.6–1.3)
DIFFERENTIAL METHOD BLD: ABNORMAL
EOSINOPHIL # BLD: 0.2 K/UL (ref 0–0.4)
EOSINOPHIL NFR BLD: 3 % (ref 0–5)
EPITH CASTS URNS QL MICRO: ABNORMAL /LPF (ref 0–5)
ERYTHROCYTE [DISTWIDTH] IN BLOOD BY AUTOMATED COUNT: 14.9 % (ref 11.6–14.5)
GLOBULIN SER CALC-MCNC: 5 G/DL (ref 2–4)
GLUCOSE SERPL-MCNC: 95 MG/DL (ref 74–99)
GLUCOSE UR STRIP.AUTO-MCNC: NEGATIVE MG/DL
HCT VFR BLD AUTO: 37.8 % (ref 35–45)
HGB BLD-MCNC: 12.8 G/DL (ref 12–16)
HGB UR QL STRIP: NEGATIVE
KETONES UR QL STRIP.AUTO: ABNORMAL MG/DL
LEUKOCYTE ESTERASE UR QL STRIP.AUTO: ABNORMAL
LYMPHOCYTES # BLD: 1.9 K/UL (ref 0.9–3.6)
LYMPHOCYTES NFR BLD: 30 % (ref 21–52)
MCH RBC QN AUTO: 28.8 PG (ref 24–34)
MCHC RBC AUTO-ENTMCNC: 33.9 G/DL (ref 31–37)
MCV RBC AUTO: 85.1 FL (ref 74–97)
MONOCYTES # BLD: 0.8 K/UL (ref 0.05–1.2)
MONOCYTES NFR BLD: 13 % (ref 3–10)
NEUTS SEG # BLD: 3.4 K/UL (ref 1.8–8)
NEUTS SEG NFR BLD: 53 % (ref 40–73)
NITRITE UR QL STRIP.AUTO: NEGATIVE
PH UR STRIP: 5 [PH] (ref 5–8)
PLATELET # BLD AUTO: 205 K/UL (ref 135–420)
PMV BLD AUTO: 12.2 FL (ref 9.2–11.8)
POTASSIUM SERPL-SCNC: 3.4 MMOL/L (ref 3.5–5.5)
PROT SERPL-MCNC: 8.6 G/DL (ref 6.4–8.2)
PROT UR STRIP-MCNC: NEGATIVE MG/DL
RBC # BLD AUTO: 4.44 M/UL (ref 4.2–5.3)
SODIUM SERPL-SCNC: 138 MMOL/L (ref 136–145)
SP GR UR REFRACTOMETRY: 1.02 (ref 1–1.03)
TROPONIN I SERPL-MCNC: <0.02 NG/ML (ref 0–0.04)
TSH SERPL DL<=0.05 MIU/L-ACNC: 3.38 UIU/ML (ref 0.36–3.74)
UROBILINOGEN UR QL STRIP.AUTO: 1 EU/DL (ref 0.2–1)
WBC # BLD AUTO: 6.4 K/UL (ref 4.6–13.2)
WBC URNS QL MICRO: ABNORMAL /HPF (ref 0–4)

## 2019-12-24 PROCEDURE — 84443 ASSAY THYROID STIM HORMONE: CPT

## 2019-12-24 PROCEDURE — 80053 COMPREHEN METABOLIC PANEL: CPT

## 2019-12-24 PROCEDURE — 71046 X-RAY EXAM CHEST 2 VIEWS: CPT

## 2019-12-24 PROCEDURE — 81001 URINALYSIS AUTO W/SCOPE: CPT

## 2019-12-24 PROCEDURE — 87086 URINE CULTURE/COLONY COUNT: CPT

## 2019-12-24 PROCEDURE — 99283 EMERGENCY DEPT VISIT LOW MDM: CPT

## 2019-12-24 PROCEDURE — 85025 COMPLETE CBC W/AUTO DIFF WBC: CPT

## 2019-12-24 PROCEDURE — 84484 ASSAY OF TROPONIN QUANT: CPT

## 2019-12-25 NOTE — ED TRIAGE NOTES
Patient A/O x 4, complaining of feeling fatigued off and on x Sunday. Patient denies chest pain, SOB, NV, abdominal pain.

## 2019-12-25 NOTE — DISCHARGE INSTRUCTIONS

## 2019-12-25 NOTE — ED PROVIDER NOTES
EMERGENCY DEPARTMENT HISTORY AND PHYSICAL EXAM    10:56 PM      Date: 12/24/2019  Patient Name: Maral Robles    History of Presenting Illness     Chief Complaint   Patient presents with    Fatigue         History Provided By: Patient      Additional History (Context): Maral Robles is a 78 y.o. female with PMHx of hypertension, hypercholesterolemia, and depression who presents with general fatigue. Patient states she has been feeling intermittently more tired and sleepy over the last week. She denies chest pain, SOB, nausea, vomiting, abdominal pain, and dysuria. No modifying or aggravating factors were reported. No other concerns or symptoms at this time. PCP: Anel Weiss NP    Chief Complaint: Fatigue  Duration:  1 week  Timing:  Intermittent  Location: general  Quality: \"tired and sleepy\"  Severity: N/A  Modifying Factors: No modifying or aggravating factors were reported. Associated Symptoms: denies any other associated signs or symptoms    Current Outpatient Medications   Medication Sig Dispense Refill    hydrocortisone (ANUSOL-HC) 2.5 % rectal cream Insert  into rectum four (4) times daily. 30 g 0    LORazepam (ATIVAN) 0.5 mg tablet Take 1 Tab by mouth daily as needed for Anxiety. 4 Tab 0    sertraline (ZOLOFT) 100 mg tablet Take 100 mg by mouth daily.  butalbital-acetaminophen-caff (FIORICET) -40 mg per capsule Take 1 Cap by mouth every six (6) hours as needed for Pain. 15 Cap 0    diclofenac (VOLTAREN) 1 % gel Apply 4 g to affected area four (4) times daily. 100 g 0    hydroCHLOROthiazide (HYDRODIURIL) 25 mg tablet Take 25 mg by mouth daily.  amLODIPine (NORVASC) 10 mg tablet Take 10 mg by mouth daily.  aspirin 81 mg chewable tablet Take 81 mg by mouth daily.          Past History     Past Medical History:  Past Medical History:   Diagnosis Date    Depression     High cholesterol     Hypertension        Past Surgical History:  No past surgical history on file.    Family History:  No family history on file. Social History:  Social History     Tobacco Use    Smoking status: Never Smoker    Smokeless tobacco: Never Used   Substance Use Topics    Alcohol use: Not Currently    Drug use: Never       Allergies: Allergies   Allergen Reactions    Aleve [Naproxen Sodium] Unable to Obtain    Bactrim [Sulfamethoprim] Diarrhea    Diphenhydramine Drowsiness     Patient states \"It makes me sleepy\"    Feldene [Piroxicam] Unknown (comments)     Shaking      Penicillins Rash         Review of Systems       Review of Systems   Constitutional: Positive for fatigue. Negative for activity change and fever. HENT: Negative for congestion and rhinorrhea. Eyes: Negative for visual disturbance. Respiratory: Negative for cough and shortness of breath. Cardiovascular: Negative for chest pain and palpitations. Gastrointestinal: Negative for abdominal pain, diarrhea, nausea and vomiting. Genitourinary: Negative for dysuria and hematuria. Musculoskeletal: Negative for back pain. Skin: Negative for rash. Neurological: Negative for dizziness, weakness and light-headedness. All other systems reviewed and are negative. Physical Exam     Visit Vitals  /83 (BP 1 Location: Left arm, BP Patient Position: At rest)   Pulse 81   Temp 98 °F (36.7 °C)   Resp 18   SpO2 97%         Physical Exam  Vitals signs and nursing note reviewed. Constitutional:       General: She is not in acute distress. Appearance: She is well-developed. HENT:      Head: Normocephalic and atraumatic. Right Ear: External ear normal.      Left Ear: External ear normal.      Nose: Nose normal.   Eyes:      Conjunctiva/sclera: Conjunctivae normal.      Pupils: Pupils are equal, round, and reactive to light. Neck:      Musculoskeletal: Normal range of motion and neck supple. Trachea: No tracheal deviation.    Cardiovascular:      Rate and Rhythm: Normal rate and regular rhythm. Pulmonary:      Effort: Pulmonary effort is normal.      Breath sounds: Normal breath sounds. Chest:      Chest wall: No tenderness. Abdominal:      General: Bowel sounds are normal. There is no distension. Palpations: Abdomen is soft. Tenderness: There is no tenderness. There is no guarding or rebound. Musculoskeletal: Normal range of motion. General: No tenderness. Skin:     General: Skin is warm and dry. Neurological:      Mental Status: She is alert and oriented to person, place, and time. Cranial Nerves: No cranial nerve deficit. Coordination: Coordination normal.   Psychiatric:         Behavior: Behavior normal.         Thought Content: Thought content normal.         Judgment: Judgment normal.           Diagnostic Study Results     Labs -  Recent Results (from the past 12 hour(s))   CBC WITH AUTOMATED DIFF    Collection Time: 12/24/19  9:25 PM   Result Value Ref Range    WBC 6.4 4.6 - 13.2 K/uL    RBC 4.44 4.20 - 5.30 M/uL    HGB 12.8 12.0 - 16.0 g/dL    HCT 37.8 35.0 - 45.0 %    MCV 85.1 74.0 - 97.0 FL    MCH 28.8 24.0 - 34.0 PG    MCHC 33.9 31.0 - 37.0 g/dL    RDW 14.9 (H) 11.6 - 14.5 %    PLATELET 546 735 - 155 K/uL    MPV 12.2 (H) 9.2 - 11.8 FL    NEUTROPHILS 53 40 - 73 %    LYMPHOCYTES 30 21 - 52 %    MONOCYTES 13 (H) 3 - 10 %    EOSINOPHILS 3 0 - 5 %    BASOPHILS 1 0 - 2 %    ABS. NEUTROPHILS 3.4 1.8 - 8.0 K/UL    ABS. LYMPHOCYTES 1.9 0.9 - 3.6 K/UL    ABS. MONOCYTES 0.8 0.05 - 1.2 K/UL    ABS. EOSINOPHILS 0.2 0.0 - 0.4 K/UL    ABS.  BASOPHILS 0.0 0.0 - 0.1 K/UL    DF AUTOMATED     METABOLIC PANEL, COMPREHENSIVE    Collection Time: 12/24/19  9:25 PM   Result Value Ref Range    Sodium 138 136 - 145 mmol/L    Potassium 3.4 (L) 3.5 - 5.5 mmol/L    Chloride 103 100 - 111 mmol/L    CO2 29 21 - 32 mmol/L    Anion gap 6 3.0 - 18 mmol/L    Glucose 95 74 - 99 mg/dL    BUN 14 7.0 - 18 MG/DL    Creatinine 0.68 0.6 - 1.3 MG/DL    BUN/Creatinine ratio 21 (H) 12 - 20 GFR est AA >60 >60 ml/min/1.73m2    GFR est non-AA >60 >60 ml/min/1.73m2    Calcium 9.8 8.5 - 10.1 MG/DL    Bilirubin, total 0.1 (L) 0.2 - 1.0 MG/DL    ALT (SGPT) 99 (H) 13 - 56 U/L    AST (SGOT) 57 (H) 10 - 38 U/L    Alk. phosphatase 99 45 - 117 U/L    Protein, total 8.6 (H) 6.4 - 8.2 g/dL    Albumin 3.6 3.4 - 5.0 g/dL    Globulin 5.0 (H) 2.0 - 4.0 g/dL    A-G Ratio 0.7 (L) 0.8 - 1.7     TSH 3RD GENERATION    Collection Time: 12/24/19  9:25 PM   Result Value Ref Range    TSH 3.38 0.36 - 3.74 uIU/mL   TROPONIN I    Collection Time: 12/24/19  9:25 PM   Result Value Ref Range    Troponin-I, QT <0.02 0.0 - 0.045 NG/ML   URINALYSIS W/ RFLX MICROSCOPIC    Collection Time: 12/24/19 10:30 PM   Result Value Ref Range    Color YELLOW      Appearance CLEAR      Specific gravity 1.024 1.005 - 1.030      pH (UA) 5.0 5.0 - 8.0      Protein NEGATIVE  NEG mg/dL    Glucose NEGATIVE  NEG mg/dL    Ketone TRACE (A) NEG mg/dL    Bilirubin NEGATIVE  NEG      Blood NEGATIVE  NEG      Urobilinogen 1.0 0.2 - 1.0 EU/dL    Nitrites NEGATIVE  NEG      Leukocyte Esterase MODERATE (A) NEG     URINE MICROSCOPIC ONLY    Collection Time: 12/24/19 10:30 PM   Result Value Ref Range    WBC 5 to 10 0 - 4 /hpf    Epithelial cells 2+ 0 - 5 /lpf    Bacteria 1+ (A) NEG /hpf       Radiologic Studies -   XR CHEST PA LAT    (Results Pending)         Medical Decision Making     It should be noted that GERARDO, Reena Agarwal DO will be the provider of record for this patient. I reviewed the vital signs, available nursing notes, past medical history, past surgical history, family history and social history. Vital Signs-Reviewed the patient's vital signs.     Pulse Oximetry Analysis -  97% on room air , nml    Cardiac Monitor:  Rate: 81 BPM    Records Reviewed: Nursing Notes and Old Medical Records (Time of Review: 10:56 PM)    Orders Placed This Encounter    CULTURE, URINE    XR CHEST PA LAT    CBC WITH AUTOMATED DIFF    COMPREHENSIVE METABOLIC PANEL    URINALYSIS W/ RFLX MICROSCOPIC    TSH 3RD GENERATION    TROPONIN I    URINE MICROSCOPIC ONLY       ED Course: Progress Notes, Reevaluation, and Consults:  12:26 AM  No acute findings on blood work or urine    Provider Notes (Medical Decision Making):   Patient is a 8year-old female with a history of fatigue who comes in for nonspecific fatigue. No acute findings on blood work, urine. EKG, chest x-ray, troponin within normal limits. No evidence for infection. Stable for discharge home. Follow-up with PMD.           Diagnosis     Clinical Impression:   1. Fatigue, unspecified type        Disposition: Discharge    Follow-up Information     Follow up With Specialties Details Why Contact Info    Stephanie Zapata NP Nurse Practitioner Schedule an appointment as soon as possible for a visit  [address] [facility] [phone]             Patient's Medications   Start Taking    No medications on file   Continue Taking    AMLODIPINE (NORVASC) 10 MG TABLET    Take 10 mg by mouth daily. ASPIRIN 81 MG CHEWABLE TABLET    Take 81 mg by mouth daily. BUTALBITAL-ACETAMINOPHEN-CAFF (FIORICET) -40 MG PER CAPSULE    Take 1 Cap by mouth every six (6) hours as needed for Pain. DICLOFENAC (VOLTAREN) 1 % GEL    Apply 4 g to affected area four (4) times daily. HYDROCHLOROTHIAZIDE (HYDRODIURIL) 25 MG TABLET    Take 25 mg by mouth daily. HYDROCORTISONE (ANUSOL-HC) 2.5 % RECTAL CREAM    Insert  into rectum four (4) times daily. LORAZEPAM (ATIVAN) 0.5 MG TABLET    Take 1 Tab by mouth daily as needed for Anxiety. SERTRALINE (ZOLOFT) 100 MG TABLET    Take 100 mg by mouth daily.    These Medications have changed    No medications on file   Stop Taking    No medications on file     _______________________________       Scribe Cari Wiggins acting as a scribe for and in the presence of Laura Goode, DO      December 25, 2019 at 12:26 AM       Provider Attestation:      I personally performed the services described in the documentation, reviewed the documentation, as recorded by the scribe in my presence, and it accurately and completely records my words and actions.  December 25, 2019 at 12:26 AM - Miladis WOODRUFF DO       _______________________________

## 2019-12-27 LAB
BACTERIA SPEC CULT: NORMAL
SERVICE CMNT-IMP: NORMAL

## 2019-12-30 ENCOUNTER — HOSPITAL ENCOUNTER (OUTPATIENT)
Dept: PHYSICAL THERAPY | Age: 69
Discharge: HOME | End: 2019-12-30
Payer: MEDICARE

## 2019-12-30 PROCEDURE — 97164 PT RE-EVAL EST PLAN CARE: CPT

## 2019-12-30 PROCEDURE — 97110 THERAPEUTIC EXERCISES: CPT

## 2019-12-30 NOTE — PROGRESS NOTES
PT DAILY TREATMENT NOTE 10-18    Patient Name: Leigh Davis  Date:2019  : 1950  [x]  Patient  Verified  Payor: BLUE CROSS MEDICARE / Plan: [address] [place] [address] HMO / Product Type: Managed Care Medicare /    In time:11:00  Out time: 11:52  Total Treatment Time (min): 52 (-10 minutes for patient talking about life stressors = 42 minutes)   Visit #: 1 of 12    Medicare/BCBS Only   Total Timed Codes (min):  20 1:1 Treatment Time:  32       Treatment Area: Pain in left knee [M25.562]  Unilateral primary osteoarthritis, left knee [M17.12]    SUBJECTIVE  Pain Level (0-10 scale): 4/10 left knee, 2/10 right knee   Any medication changes, allergies to medications, adverse drug reactions, diagnosis change, or new procedure performed?: [x] No    [] Yes (see summary sheet for update)  Subjective functional status/changes:   [] No changes reported  Pt reports that she puts a lot on herself and she has been very stressed lately. She has a colonoscopy coming up and she's concerned with doing the prep on her own. She is trying to get her friend or her niece to spend the night with her. She used to walk a lot around shops and she wants to get back to this. She has not tried walking long distances for a while. She thinks her knee still dianne at times, but she can't remember the last time it buckled. She is not sure if she wants to continue therapy. She wants to think about it.      OBJECTIVE    Modality rationale: decrease pain and increase tissue extensibility to improve the patients ability to improve ease of prolonged ambulation    Min Type Additional Details    [] Estim:  []Unatt       []IFC  []Premod                        []Other:  []w/ice   []w/heat  Position:  Location:    [] Estim: []Att    []TENS instruct  []NMES                    []Other:  []w/US   []w/ice   []w/heat  Position:  Location:    []  Traction: [] Cervical       []Lumbar                       [] Prone          []Supine []Intermittent   []Continuous Lbs:  [] before manual  [] after manual    []  Ultrasound: []Continuous   [] Pulsed                           []1MHz   []3MHz W/cm2:  Location:    []  Iontophoresis with dexamethasone         Location: [] Take home patch   [] In clinic   10 []  Ice     [x]  heat  []  Ice massage  []  Laser   []  Anodyne Position: supine with HOB elevated and wedge under BLE  Location: B knees     []  Laser with stim  []  Other:  Position:  Location:    []  Vasopneumatic Device Pressure:       [] lo [] med [] hi   Temperature: [] lo [] med [] hi   [x] Skin assessment post-treatment:  [x]intact []redness- no adverse reaction    []redness  adverse reaction:     12 minutes Re-Eval    20 min Therapeutic Exercise:  [x] See flow sheet :   Rationale: increase ROM and increase strength to improve the patients ability to improve ease/ability with prolonged ambulation             With   [] TE   [] TA   [] neuro   [] other: Patient Education: [x] Review HEP    [] Progressed/Changed HEP based on:   [] positioning   [] body mechanics   [] transfers   [] heat/ice application    [] other:      Other Objective/Functional Measures:     MMT B Knees  Flexion: 4-/5 B  Extension: Left 4+/5, Right 4/5     FOTO 43    TTP left knee lateral joint line, fibular head, and pes anserinus  TTP Right knee medial joint line, medial hamstring tendons and pes anserinus   No difficulty or pain with interventions     Pain Level (0-10 scale) post treatment: 4/10 left knee, 2/10 right knee     ASSESSMENT/Changes in Function: See Progress Note    Patient will continue to benefit from skilled PT services to modify and progress therapeutic interventions, address functional mobility deficits, address ROM deficits, address strength deficits, analyze and address soft tissue restrictions, analyze and cue movement patterns, analyze and modify body mechanics/ergonomics, assess and modify postural abnormalities and instruct in home and community integration to attain remaining goals.      []  See Plan of Care  [x]  See progress note/recertification  []  See Discharge Summary         Progress towards goals / Updated goals:  See Progress Note    PLAN  [x]  Upgrade activities as tolerated     [x]  Continue plan of care  []  Update interventions per flow sheet       []  Discharge due to:_  []  Other:_      Aj Mosqueda, PT 12/30/2019  11:10 AM    Future Appointments   Date Time Provider Bharat Thakkar   1/2/2020  2:30 PM Manav Garcia, PTA [facility]

## 2020-01-01 NOTE — PROGRESS NOTES
[facility]  [place] [employer] OF [facility] MARIANN  [address] [place] [address]  [phone] fax    Continued Plan of Care/ Re-certification for Physical Therapy Services    Patient name: April Lindsey Start of Care: 19   Referral source: Maria R Spear MD : 1950   Medical/Treatment Diagnosis: Pain in left knee [M25.562]  Unilateral primary osteoarthritis, left knee [M17.12]  Payor: BLUE CROSS MEDICARE / Plan: [address] [place] [address] [place] HMO / Product Type: Managed Care Medicare /  Onset Date:2019     Prior Hospitalization: see medical history Provider#: [id number]   Medications: Verified on Patient Summary List    Comorbidities: depression, OA, HTN   Prior Level of Function: I ambulation                  Visits from Start of Care: 10    Missed Visits: 5    The Plan of Care and following information is based on the patient's current status:  Goal: Pt will demonstrate an increased FOTO score to 54/100 in order to improve function. Status at last note/certification: Not met. Increased 5 points since initial evaluation. Declined 8 points since last assessed   Current Status: not met    Goal: Pt will be independent with HEP at D/C for self management. Status at last note/certification: Not met. Noncompliance   Current Status: not met    Goal: Pt will be able to ambulate 250ft continuously without AD/LE buckling/LOB/rest break in order to return to community ambulation and her daily walking regimen  Status at last note/certification: Progressing. Pt cannot remember the last time her knee buckled, but she has not tried prolonged ambulation. Not formally assessed this visit d/t time constraint. Current Status: not met    Goal: Pt will demonstrate increased left knee flexion and extension strength to > 4+/5 in order to assist with ambulation and transfers  Status at last note/certification: Progressing.   Flexion: 4-/5 B  Extension: Left 4+/5, Right 4/5   Current Status: not met    Key functional changes: reduced knee buckling with ambulation       Problems/ barriers to goal attainment: inconsistent/decreased therapy attendance, noncompliance with HEP     Problem List: pain affecting function, decrease ROM, decrease strength, impaired gait/ balance, decrease ADL/ functional abilitiies, decrease activity tolerance and decrease flexibility/ joint mobility    Treatment Plan: Therapeutic exercise, Therapeutic activities, Neuromuscular re-education, Physical agent/modality, Gait/balance training, Manual therapy, Patient education, Self Care training, Functional mobility training, Home safety training and Stair training     Patient Goal (s) has been updated and includes: be able to walk farther      Goals for this certification period to be accomplished in 4 weeks:  1. Pt will demonstrate an increased FOTO score to 54/100 in order to improve function. 2. Pt will demonstrate understanding/compliance with final HEP in order to allow for continued progression independently following DC   3. Pt will be able to ambulate 250ft continuously without AD/LE buckling/LOB/rest break in order to return to community ambulation and her daily walking regimen  4. Pt will improve left knee flexion strength to 4/5 in order to improve ease/ability with prolonged ambulation and stairs.        Frequency / Duration: Patient to be seen 2-3 times per week for 4 weeks:    Assessment / Recommendations: Pt is making slow progress in therapy, limited by inconsistent therapy attendance and noncompliance with HEP during her absence. She is concerned about other life stressors, which is limiting her participation in therapy, and pt has only attended 3 sessions since last progress note. Strength of B knees is improving, and pt cannot remember the last time her knee buckled with ambulation. Her right knee is now bothering her as well, and she has has not tried prolonged ambulation.   Decreased ambulatory tolerance is her greatest remaining complaint. Discussed importance of therapy attendance and HEP compliance or else will DC with HEP if pt cannot attend therapy consistently. Pt is going to consider and let therapy know at next session. With consistent attendance, pt will benefit from continued skilled PT to address remaining strength, ROM, and functional mobility deficits in order to improve ease/ability with prolonged ambulation and daily tasks. Certification Period: 12/30/19 to 1/28/20    Nancy Sanchez, PT 1/1/2020 11:27 AM    ________________________________________________________________________  I certify that the above Therapy Services are being furnished while the patient is under my care. I agree with the treatment plan and certify that this therapy is necessary. [] I have read the above and request that my patient continue as recommended.   [] I have read the above report and request that my patient continue therapy with the following changes/special instructions: _______________________________________  [] I have read the above report and request that my patient be discharged from therapy    Physician's Signature:____________Date:_________TIME:________    ** Signature, Date and Time must be completed for valid certification **    Please sign and return to [facility]  [facility]  MARIANN  [address] [place] [address]  [phone] fax

## 2020-01-02 ENCOUNTER — HOSPITAL ENCOUNTER (OUTPATIENT)
Dept: PHYSICAL THERAPY | Age: 70
Discharge: HOME | End: 2020-01-02
Payer: MEDICARE

## 2020-01-02 PROCEDURE — 97110 THERAPEUTIC EXERCISES: CPT

## 2020-01-02 NOTE — PROGRESS NOTES
PT DAILY TREATMENT NOTE 10-18    Patient Name: Silvio Tejeda  Date:2020  : 1950  [x]  Patient  Verified  Payor: BLUE CROSS MEDICARE / Plan: [address] [place] [address] HMO / Product Type: Managed Care Medicare /    In time:230  Out time:312  Total Treatment Time (min): 42  Visit #: 1 of 12    Medicare/BCBS Only   Total Timed Codes (min):  32 1:1 Treatment Time:  32       Treatment Area: Pain in left knee [M25.562]  Unilateral primary osteoarthritis, left knee [M17.12]    SUBJECTIVE  Pain Level (0-10 scale): 4/10  Any medication changes, allergies to medications, adverse drug reactions, diagnosis change, or new procedure performed?: [x] No    [] Yes (see summary sheet for update)  Subjective functional status/changes:   [] No changes reported  Pt stated that her knee still hurts and she wants to cont with therapy    OBJECTIVE    Modality rationale: decrease pain and increase tissue extensibility to improve the patients ability to increase ease with ADLs   Min Type Additional Details    [] Estim:  []Unatt       []IFC  []Premod                        []Other:  []w/ice   []w/heat  Position:  Location:    [] Estim: []Att    []TENS instruct  []NMES                    []Other:  []w/US   []w/ice   []w/heat  Position:  Location:    []  Traction: [] Cervical       []Lumbar                       [] Prone          []Supine                       []Intermittent   []Continuous Lbs:  [] before manual  [] after manual    []  Ultrasound: []Continuous   [] Pulsed                           []1MHz   []3MHz W/cm2:  Location:    []  Iontophoresis with dexamethasone         Location: [] Take home patch   [] In clinic   10 []  Ice     [x]  heat  []  Ice massage  []  Laser   []  Anodyne Position:supine  Location:left knee    []  Laser with stim  []  Other:  Position:  Location:    []  Vasopneumatic Device Pressure:       [] lo [] med [] hi   Temperature: [] lo [] med [] hi   [x] Skin assessment post-treatment:  [x]intact []redness- no adverse reaction    []redness  adverse reaction:     32 min Therapeutic Exercise:  [x] See flow sheet :   Rationale: increase ROM and increase strength to improve the patients ability to increase ease with ADLs    With   [x] TE   [] TA   [] neuro   [] other: Patient Education: [x] Review HEP    [] Progressed/Changed HEP based on:   [] positioning   [] body mechanics   [] transfers   [] heat/ice application    [] other:      Other Objective/Functional Measures:   Had no difficulty with exercises but was fatigued after session  Pt was issued final HEP and showed understanding of all exercises  Complained of slight increase in right knee pain with SLR with ER     Pain Level (0-10 scale) post treatment: 3/10    ASSESSMENT/Changes in Function:   Pt is making slow progress toward goals. Pt cont with mild pain in the left knee. Pt   Cont to have difficulty with performing ADLs. Pt cont to have decreased walking and standing tolerance    Patient will continue to benefit from skilled PT services to modify and progress therapeutic interventions, address functional mobility deficits, address ROM deficits, address strength deficits, analyze and cue movement patterns, analyze and modify body mechanics/ergonomics and instruct in home and community integration to attain remaining goals. []  See Plan of Care  [x]  See progress note/recertification  []  See Discharge Summary         Progress towards goals / Updated goals:  1. Pt will demonstrate an increased FOTO score to 54/100 in order to improve function.   2. Pt will demonstrate understanding/compliance with final HEP in order to allow for continued progression independently following DC   3. Pt will be able to ambulate 250ft continuously without AD/LE buckling/LOB/rest break in order to return to community ambulation and her daily walking regimen  4. Pt will improve left knee flexion strength to 4/5 in order to improve ease/ability with prolonged ambulation and stairs. Progressing.  1/2/20    PLAN  []  Upgrade activities as tolerated     [x]  Continue plan of care  []  Update interventions per flow sheet       []  Discharge due to:_  []  Other:_      Alfred Cantrell PTA 1/2/2020  2:21 PM    Future Appointments   Date Time Provider Bharat Thakkar   1/2/2020  2:30 PM Cayla Maxwell [facility]

## 2020-01-10 ENCOUNTER — HOSPITAL ENCOUNTER (OUTPATIENT)
Dept: PHYSICAL THERAPY | Age: 70
Discharge: HOME | End: 2020-01-10
Payer: MEDICARE

## 2020-01-10 PROCEDURE — 97140 MANUAL THERAPY 1/> REGIONS: CPT

## 2020-01-10 PROCEDURE — 97110 THERAPEUTIC EXERCISES: CPT

## 2020-01-10 NOTE — PROGRESS NOTES
PT DAILY TREATMENT NOTE 10-18    Patient Name: Daniel Morales  Date:1/10/2020  : 1950  [x]  Patient  Verified  Payor: BLUE CROSS MEDICARE / Plan: [address] [place] [address] HMO / Product Type: Managed Care Medicare /    In time: 2:32   Out time: 3:26  Total Treatment Time (min): 47  Visit #: 2 of 12    Medicare/BCBS Only   Total Timed Codes (min):  39 1:1 Treatment Time:  39       Treatment Area: Pain in left knee [M25.562]  Unilateral primary osteoarthritis, left knee [M17.12]    SUBJECTIVE  Pain Level (0-10 scale): 7/10  Any medication changes, allergies to medications, adverse drug reactions, diagnosis change, or new procedure performed?: [x] No    [] Yes (see summary sheet for update)  Subjective functional status/changes:   [] No changes reported  Pt reports increased knee pain on the inner side of both knees. She reports she used to do a lot of walking and line dancing. She notes the heat usually feels good on her knees.        OBJECTIVE    Modality rationale: decrease pain and increase tissue extensibility to improve the patients ability to increase ease with ADLs   Min Type Additional Details    [] Estim:  []Unatt       []IFC  []Premod                        []Other:  []w/ice   []w/heat  Position:  Location:    [] Estim: []Att    []TENS instruct  []NMES                    []Other:  []w/US   []w/ice   []w/heat  Position:  Location:    []  Traction: [] Cervical       []Lumbar                       [] Prone          []Supine                       []Intermittent   []Continuous Lbs:  [] before manual  [] after manual    []  Ultrasound: []Continuous   [] Pulsed                           []1MHz   []3MHz W/cm2:  Location:    []  Iontophoresis with dexamethasone         Location: [] Take home patch   [] In clinic   15 []  Ice     [x]  heat  []  Ice massage  []  Laser   []  Anodyne Position:supine  Location: B Knees    []  Laser with stim  []  Other:  Position:  Location:    []  Vasopneumatic Device Pressure:       [] lo [] med [] hi   Temperature: [] lo [] med [] hi   [x] Skin assessment post-treatment:  [x]intact []redness- no adverse reaction    []redness  adverse reaction:     29 min Therapeutic Exercise:  [x] See flow sheet :   Rationale: increase ROM and increase strength to improve the patients ability to increase ease with ADLs    10 minutes of manual for leg lengthening to correct a left upslip and shotgun technique; no rotation noted of innominate and gentle tibial distraction with grade II tibia AP/PA mobs for decreased pain with ambulation    With   [x] TE   [] TA   [] neuro   [] other: Patient Education: [x] Review HEP    [] Progressed/Changed HEP based on:   [] positioning   [] body mechanics   [] transfers   [] heat/ice application    [] other:      Other Objective/Functional Measures:   Challenged with hip abduction and extension strengthening and max cues for correct form  Decreased pain with correction of left upslip to even leg lengths for ambulation  Educated on importance of hip strength to help with both knee and back pain for better stability  Swelling in right knee medially compared to left  Educated on gentle distraction she could perform to self for pain relief    Pain Level (0-10 scale) post treatment: 4/10    ASSESSMENT/Changes in Function: Pt with left upslip that was like contributing to increased compression during ambulation. She has decreased hip strength especially abduction to limit valgus knee collapse. Will focus remaining sessions on hip strengthening to improve knee alignment and decrease pain with walking to return to walking regimen for physical and psychological benefit. Progress towards goals / Updated goals:  1. Pt will demonstrate an increased FOTO score to 54/100 in order to improve function.   2. Pt will demonstrate understanding/compliance with final HEP in order to allow for continued progression independently following DC   3. Pt will be able to ambulate 250ft continuously without AD/LE buckling/LOB/rest break in order to return to community ambulation and her daily walking regimen  4. Pt will improve left knee flexion strength to 4/5 in order to improve ease/ability with prolonged ambulation and stairs. Progressing.  1/2/20    PLAN  [x]  Upgrade activities as tolerated     [x]  Continue plan of care  []  Update interventions per flow sheet       []  Discharge due to:_  []  Other:_      Chloé Tang, EMMA 1/10/2020  2:21 PM    Future Appointments   Date Time Provider Bharat Thakkar   1/10/2020  2:30 PM [place] [facility]   1/14/2020  3:30 PM Jada Sheppard, PT [id number] [facility]   1/16/2020  3:00 PM Jada Sheppard, PT [id number] [facility]   1/21/2020  3:30 PM Amy Nicholas [facility]

## 2020-01-13 ENCOUNTER — HOSPITAL ENCOUNTER (EMERGENCY)
Age: 70
Discharge: HOME | End: 2020-01-14
Attending: EMERGENCY MEDICINE
Payer: MEDICARE

## 2020-01-13 ENCOUNTER — APPOINTMENT (OUTPATIENT)
Dept: GENERAL RADIOLOGY | Age: 70
End: 2020-01-13
Attending: EMERGENCY MEDICINE
Payer: MEDICARE

## 2020-01-13 ENCOUNTER — APPOINTMENT (OUTPATIENT)
Dept: CT IMAGING | Age: 70
End: 2020-01-13
Attending: EMERGENCY MEDICINE
Payer: MEDICARE

## 2020-01-13 VITALS
TEMPERATURE: 97.9 F | HEIGHT: 64 IN | SYSTOLIC BLOOD PRESSURE: 152 MMHG | OXYGEN SATURATION: 99 % | RESPIRATION RATE: 12 BRPM | WEIGHT: 150 LBS | DIASTOLIC BLOOD PRESSURE: 81 MMHG | BODY MASS INDEX: 25.61 KG/M2 | HEART RATE: 70 BPM

## 2020-01-13 DIAGNOSIS — R55 NEAR SYNCOPE: Primary | ICD-10-CM

## 2020-01-13 LAB
ALBUMIN SERPL-MCNC: 4 G/DL (ref 3.4–5)
ALBUMIN/GLOB SERPL: 0.8 {RATIO} (ref 0.8–1.7)
ALP SERPL-CCNC: 119 U/L (ref 45–117)
ALT SERPL-CCNC: 87 U/L (ref 13–56)
ANION GAP SERPL CALC-SCNC: 5 MMOL/L (ref 3–18)
AST SERPL-CCNC: 65 U/L (ref 10–38)
BASOPHILS # BLD: 0 K/UL (ref 0–0.1)
BASOPHILS NFR BLD: 1 % (ref 0–2)
BILIRUB SERPL-MCNC: 0.4 MG/DL (ref 0.2–1)
BUN SERPL-MCNC: 14 MG/DL (ref 7–18)
BUN/CREAT SERPL: 25 (ref 12–20)
CALCIUM SERPL-MCNC: 10 MG/DL (ref 8.5–10.1)
CHLORIDE SERPL-SCNC: 102 MMOL/L (ref 100–111)
CO2 SERPL-SCNC: 30 MMOL/L (ref 21–32)
CREAT SERPL-MCNC: 0.57 MG/DL (ref 0.6–1.3)
DIFFERENTIAL METHOD BLD: ABNORMAL
EOSINOPHIL # BLD: 0.1 K/UL (ref 0–0.4)
EOSINOPHIL NFR BLD: 2 % (ref 0–5)
ERYTHROCYTE [DISTWIDTH] IN BLOOD BY AUTOMATED COUNT: 15 % (ref 11.6–14.5)
GLOBULIN SER CALC-MCNC: 5.3 G/DL (ref 2–4)
GLUCOSE BLD STRIP.AUTO-MCNC: 105 MG/DL (ref 70–110)
GLUCOSE SERPL-MCNC: 79 MG/DL (ref 74–99)
HCT VFR BLD AUTO: 41.8 % (ref 35–45)
HGB BLD-MCNC: 14.2 G/DL (ref 12–16)
LYMPHOCYTES # BLD: 2.1 K/UL (ref 0.9–3.6)
LYMPHOCYTES NFR BLD: 36 % (ref 21–52)
MCH RBC QN AUTO: 28.9 PG (ref 24–34)
MCHC RBC AUTO-ENTMCNC: 34 G/DL (ref 31–37)
MCV RBC AUTO: 85 FL (ref 74–97)
MONOCYTES # BLD: 0.5 K/UL (ref 0.05–1.2)
MONOCYTES NFR BLD: 9 % (ref 3–10)
NEUTS SEG # BLD: 3 K/UL (ref 1.8–8)
NEUTS SEG NFR BLD: 52 % (ref 40–73)
PLATELET # BLD AUTO: 188 K/UL (ref 135–420)
PMV BLD AUTO: 11.4 FL (ref 9.2–11.8)
POTASSIUM SERPL-SCNC: 4.8 MMOL/L (ref 3.5–5.5)
PROT SERPL-MCNC: 9.3 G/DL (ref 6.4–8.2)
RBC # BLD AUTO: 4.92 M/UL (ref 4.2–5.3)
SODIUM SERPL-SCNC: 137 MMOL/L (ref 136–145)
TROPONIN I SERPL-MCNC: <0.02 NG/ML (ref 0–0.04)
WBC # BLD AUTO: 5.7 K/UL (ref 4.6–13.2)

## 2020-01-13 PROCEDURE — 84484 ASSAY OF TROPONIN QUANT: CPT

## 2020-01-13 PROCEDURE — 99283 EMERGENCY DEPT VISIT LOW MDM: CPT

## 2020-01-13 PROCEDURE — 74011250636 HC RX REV CODE- 250/636: Performed by: EMERGENCY MEDICINE

## 2020-01-13 PROCEDURE — 82962 GLUCOSE BLOOD TEST: CPT

## 2020-01-13 PROCEDURE — 71046 X-RAY EXAM CHEST 2 VIEWS: CPT

## 2020-01-13 PROCEDURE — 85025 COMPLETE CBC W/AUTO DIFF WBC: CPT

## 2020-01-13 PROCEDURE — 80053 COMPREHEN METABOLIC PANEL: CPT

## 2020-01-13 PROCEDURE — 93005 ELECTROCARDIOGRAM TRACING: CPT

## 2020-01-13 PROCEDURE — 70450 CT HEAD/BRAIN W/O DYE: CPT

## 2020-01-13 RX ADMIN — SODIUM CHLORIDE 1000 ML: 900 INJECTION, SOLUTION INTRAVENOUS at 23:15

## 2020-01-13 NOTE — ED TRIAGE NOTES
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 I felt drowsy, almost like I was going to pass out. I've been feeling dizzy off and on for a while. \"

## 2020-01-13 NOTE — ED PROVIDER NOTES
EMERGENCY DEPARTMENT HISTORY AND PHYSICAL EXAM    Date: 1/13/2020  Patient Name: Huang Merchant    History of Presenting Illness     Chief Complaint   Patient presents with    Other         History Provided By: Patient    Additional History (Context): Huang Merchant is a 78 y.o. female with hypertension and depression who presents with description of feeling lightheaded and near syncopal for the past week. Denies chest pain shortness of breath injury vomiting or headache. She denies any SI HI. She denies any abdominal pain. Says she become symptomatic with the feeling of lightheaded and dizzy primarily when she is sitting. PCP: Jill Webster NP    Current Outpatient Medications   Medication Sig Dispense Refill    hydrocortisone (ANUSOL-HC) 2.5 % rectal cream Insert  into rectum four (4) times daily. 30 g 0    LORazepam (ATIVAN) 0.5 mg tablet Take 1 Tab by mouth daily as needed for Anxiety. 4 Tab 0    sertraline (ZOLOFT) 100 mg tablet Take 100 mg by mouth daily.  butalbital-acetaminophen-caff (FIORICET) -40 mg per capsule Take 1 Cap by mouth every six (6) hours as needed for Pain. 15 Cap 0    diclofenac (VOLTAREN) 1 % gel Apply 4 g to affected area four (4) times daily. 100 g 0    hydroCHLOROthiazide (HYDRODIURIL) 25 mg tablet Take 25 mg by mouth daily.  amLODIPine (NORVASC) 10 mg tablet Take 10 mg by mouth daily.  aspirin 81 mg chewable tablet Take 81 mg by mouth daily. Past History     Past Medical History:  Past Medical History:   Diagnosis Date    Depression     High cholesterol     Hypertension        Past Surgical History:  History reviewed. No pertinent surgical history. Family History:  History reviewed. No pertinent family history. Social History:  Social History     Tobacco Use    Smoking status: Never Smoker    Smokeless tobacco: Never Used   Substance Use Topics    Alcohol use: Not Currently    Drug use: Never       Allergies:   Allergies Allergen Reactions    Aleve [Naproxen Sodium] Unable to Obtain    Bactrim [Sulfamethoprim] Diarrhea    Diphenhydramine Drowsiness     Patient states \"It makes me sleepy\"    Feldene [Piroxicam] Unknown (comments)     Shaking      Penicillins Rash         Review of Systems   Review of Systems   Constitutional: Negative for fatigue and fever. Respiratory: Negative for shortness of breath. Cardiovascular: Negative for chest pain. Gastrointestinal: Negative for nausea and vomiting. Musculoskeletal: Negative. Skin: Negative. Neurological: Positive for light-headedness. Hematological: Negative. Psychiatric/Behavioral: Negative. All Other Systems Negative  Physical Exam     Vitals:    01/13/20 1450   BP: 152/81   Pulse: 70   Resp: 12   Temp: 97.9 °F (36.6 °C)   SpO2: 99%   Weight: 68 kg (150 lb)   Height: 5' 4\" (1.626 m)     Physical Exam  Vitals signs and nursing note reviewed. Constitutional:       Appearance: She is well-developed. HENT:      Head: Normocephalic and atraumatic. Eyes:      Pupils: Pupils are equal, round, and reactive to light. Neck:      Thyroid: No thyromegaly. Vascular: No JVD. Trachea: No tracheal deviation. Cardiovascular:      Rate and Rhythm: Normal rate and regular rhythm. Heart sounds: Normal heart sounds. No murmur. No friction rub. No gallop. Pulmonary:      Effort: Pulmonary effort is normal. No respiratory distress. Breath sounds: Normal breath sounds. No stridor. No wheezing or rales. Chest:      Chest wall: No tenderness. Abdominal:      General: There is no distension. Palpations: Abdomen is soft. There is no mass. Tenderness: There is no tenderness. There is no guarding or rebound. Musculoskeletal:         General: No tenderness. Lymphadenopathy:      Cervical: No cervical adenopathy. Skin:     General: Skin is warm and dry. Coloration: Skin is not pale. Findings: No erythema or rash. Neurological:      Mental Status: She is alert and oriented to person, place, and time. Psychiatric:         Behavior: Behavior normal.         Thought Content: Thought content normal.          Diagnostic Study Results     Labs -     Recent Results (from the past 12 hour(s))   EKG, 12 LEAD, INITIAL    Collection Time: 01/13/20  6:24 PM   Result Value Ref Range    Ventricular Rate 65 BPM    Atrial Rate 65 BPM    P-R Interval 142 ms    QRS Duration 92 ms    Q-T Interval 392 ms    QTC Calculation (Bezet) 407 ms    Calculated P Axis 50 degrees    Calculated R Axis 21 degrees    Calculated T Axis 24 degrees    Diagnosis       Normal sinus rhythm  Cannot rule out Anterior infarct (cited on or before 03-DEC-2019)  Abnormal ECG  When compared with ECG of 03-DEC-2019 12:44,  No significant change was found     CBC WITH AUTOMATED DIFF    Collection Time: 01/13/20  6:36 PM   Result Value Ref Range    WBC 5.7 4.6 - 13.2 K/uL    RBC 4.92 4.20 - 5.30 M/uL    HGB 14.2 12.0 - 16.0 g/dL    HCT 41.8 35.0 - 45.0 %    MCV 85.0 74.0 - 97.0 FL    MCH 28.9 24.0 - 34.0 PG    MCHC 34.0 31.0 - 37.0 g/dL    RDW 15.0 (H) 11.6 - 14.5 %    PLATELET 219 914 - 092 K/uL    MPV 11.4 9.2 - 11.8 FL    NEUTROPHILS 52 40 - 73 %    LYMPHOCYTES 36 21 - 52 %    MONOCYTES 9 3 - 10 %    EOSINOPHILS 2 0 - 5 %    BASOPHILS 1 0 - 2 %    ABS. NEUTROPHILS 3.0 1.8 - 8.0 K/UL    ABS. LYMPHOCYTES 2.1 0.9 - 3.6 K/UL    ABS. MONOCYTES 0.5 0.05 - 1.2 K/UL    ABS. EOSINOPHILS 0.1 0.0 - 0.4 K/UL    ABS.  BASOPHILS 0.0 0.0 - 0.1 K/UL    DF AUTOMATED     METABOLIC PANEL, COMPREHENSIVE    Collection Time: 01/13/20  6:36 PM   Result Value Ref Range    Sodium 137 136 - 145 mmol/L    Potassium 4.8 3.5 - 5.5 mmol/L    Chloride 102 100 - 111 mmol/L    CO2 30 21 - 32 mmol/L    Anion gap 5 3.0 - 18 mmol/L    Glucose 79 74 - 99 mg/dL    BUN 14 7.0 - 18 MG/DL    Creatinine 0.57 (L) 0.6 - 1.3 MG/DL    BUN/Creatinine ratio 25 (H) 12 - 20      GFR est AA >60 >60 ml/min/1.73m2    GFR est non-AA >60 >60 ml/min/1.73m2    Calcium 10.0 8.5 - 10.1 MG/DL    Bilirubin, total 0.4 0.2 - 1.0 MG/DL    ALT (SGPT) 87 (H) 13 - 56 U/L    AST (SGOT) 65 (H) 10 - 38 U/L    Alk. phosphatase 119 (H) 45 - 117 U/L    Protein, total 9.3 (H) 6.4 - 8.2 g/dL    Albumin 4.0 3.4 - 5.0 g/dL    Globulin 5.3 (H) 2.0 - 4.0 g/dL    A-G Ratio 0.8 0.8 - 1.7     TROPONIN I    Collection Time: 01/13/20  6:36 PM   Result Value Ref Range    Troponin-I, QT <0.02 0.0 - 0.045 NG/ML       Radiologic Studies -   CT HEAD WO CONT   Final Result   IMPRESSION:      No clearly acute findings. Of note, MRI is more sensitive for detecting acute   infarct. Mild periventricular and patchy subcortical low-attenuation, likely chronic   microvascular ischemic change. XR CHEST PA LAT    (Results Pending)     CT Results  (Last 48 hours)               01/13/20 1734  CT HEAD WO CONT Final result    Impression:  IMPRESSION:       No clearly acute findings. Of note, MRI is more sensitive for detecting acute   infarct. Mild periventricular and patchy subcortical low-attenuation, likely chronic   microvascular ischemic change. Narrative:  EXAMINATION: CT head without contrast       INDICATION: Dizziness       COMPARISON: CT 12/3/2019       TECHNIQUE: CT head performed without contrast with multiplanar reformations. All   CT scans at this facility are performed using dose optimization technique as   appropriate to a performed exam, to include automated exposure control,   adjustment of the mA and/or kV according to patient size (including appropriate   matching first site specific examinations), or use of iterative reconstruction   technique. FINDINGS:       No focal mass effect or shift of midline structures. No abnormal extra-axial   collection. Ventricles are normal in size and configuration. No acute   intracranial hemorrhage identified. Mild burden of periventricular and patchy   subcortical white matter low-attenuation. Calvarium intact. Imaged paranasal sinuses and mastoids appears well-aerated. Superficial soft tissues unremarkable. CXR Results  (Last 48 hours)    None            Medical Decision Making   I am the first provider for this patient. I reviewed the vital signs, available nursing notes, past medical history, past surgical history, family history and social history. Vital Signs-Reviewed the patient's vital signs. Procedures:  Procedures    Provider Notes (Medical Decision Making): check labs,scan, get CXR. Needs EKG. 6:10 PM : Pt care transferred to [facility] provider. History of patient complaint(s), available diagnostic reports and current treatment plan has been discussed thoroughly. Bedside rounding on patient occured : no . Intended disposition of patient : home  Pending diagnostics reports and/or labs (please list): labs, CT,EKG    3:01 AM :Pt care assumed from Mario Bledsoe, ED provider. Pt complaint(s), current treatment plan, progression and available diagnostic results have been discussed thoroughly. Rounding occurred: yes  Intended Disposition: Home versus ADMIT   Pending diagnostic reports and/or labs (please list): All labs, CT of head,  EKG, 1 L bolus and orthostatic vital signs. CXR shows nothing acute. No concerning findings on labs to include no anemia, no indication of dehydration. Troponin was negative. Patient's not complaining of chest pain. CT of head is negative. Orthostatic vital signs were negative and EKG was negative within normal limits as compared to prior EKG. Is being sent home for follow-up with primary care. Patient was comfortable with this plan she also understands her return precautions. MED RECONCILIATION:  No current facility-administered medications for this encounter. Current Outpatient Medications   Medication Sig    hydrocortisone (ANUSOL-HC) 2.5 % rectal cream Insert  into rectum four (4) times daily.     LORazepam (ATIVAN) 0.5 mg tablet Take 1 Tab by mouth daily as needed for Anxiety.  sertraline (ZOLOFT) 100 mg tablet Take 100 mg by mouth daily.  butalbital-acetaminophen-caff (FIORICET) -40 mg per capsule Take 1 Cap by mouth every six (6) hours as needed for Pain.  diclofenac (VOLTAREN) 1 % gel Apply 4 g to affected area four (4) times daily.  hydroCHLOROthiazide (HYDRODIURIL) 25 mg tablet Take 25 mg by mouth daily.  amLODIPine (NORVASC) 10 mg tablet Take 10 mg by mouth daily.  aspirin 81 mg chewable tablet Take 81 mg by mouth daily. Disposition:  TBD      Follow-up Information     Follow up With Specialties Details Why Contact Info    Todd Bowser NP Nurse Practitioner Schedule an appointment as soon as possible for a visit to follow up [address] [facility] [address]  [phone]      [address] [facility] EMERGENCY DEPT Emergency Medicine  If symptoms worsen [address] [facility] [address] [facility] [address]  [phone]          Discharge Medication List as of 1/13/2020 11:45 PM            Diagnosis     Clinical Impression:   1.  Near syncope

## 2020-01-14 ENCOUNTER — HOSPITAL ENCOUNTER (OUTPATIENT)
Dept: PHYSICAL THERAPY | Age: 70
Discharge: HOME | End: 2020-01-14
Payer: MEDICARE

## 2020-01-14 LAB
ATRIAL RATE: 65 BPM
CALCULATED P AXIS, ECG09: 50 DEGREES
CALCULATED R AXIS, ECG10: 21 DEGREES
CALCULATED T AXIS, ECG11: 24 DEGREES
DIAGNOSIS, 93000: NORMAL
P-R INTERVAL, ECG05: 142 MS
Q-T INTERVAL, ECG07: 392 MS
QRS DURATION, ECG06: 92 MS
QTC CALCULATION (BEZET), ECG08: 407 MS
VENTRICULAR RATE, ECG03: 65 BPM

## 2020-01-14 PROCEDURE — 97110 THERAPEUTIC EXERCISES: CPT

## 2020-01-14 NOTE — DISCHARGE INSTRUCTIONS
Patient Education        Lightheadedness or Faintness: Care Instructions  Your Care Instructions  Lightheadedness is a feeling that you are about to faint or \"pass out. \" You do not feel as if you or your surroundings are moving. It is different from vertigo, which is the feeling that you or things around you are spinning or tilting. Lightheadedness usually goes away or gets better when you lie down. If lightheadedness gets worse, it can lead to a fainting spell. It is common to feel lightheaded from time to time. Lightheadedness usually is not caused by a serious problem. It often is caused by a short-lasting drop in blood pressure and blood flow to your head that occurs when you get up too quickly from a seated or lying position. Follow-up care is a key part of your treatment and safety. Be sure to make and go to all appointments, and call your doctor if you are having problems. It's also a good idea to know your test results and keep a list of the medicines you take. How can you care for yourself at home? · Lie down for 1 or 2 minutes when you feel lightheaded. After lying down, sit up slowly and remain sitting for 1 to 2 minutes before slowly standing up. · Avoid movements, positions, or activities that have made you lightheaded in the past.  · Get plenty of rest, especially if you have a cold or flu, which can cause lightheadedness. · Make sure you drink plenty of fluids, especially if you have a fever or have been sweating. · Do not drive or put yourself and others in danger while you feel lightheaded. When should you call for help? Call 911 anytime you think you may need emergency care. For example, call if:    · You have symptoms of a stroke. These may include:  ? Sudden numbness, tingling, weakness, or loss of movement in your face, arm, or leg, especially on only one side of your body. ? Sudden vision changes. ? Sudden trouble speaking.   ? Sudden confusion or trouble understanding simple statements. ? Sudden problems with walking or balance. ? A sudden, severe headache that is different from past headaches.     · You have symptoms of a heart attack. These may include:  ? Chest pain or pressure, or a strange feeling in the chest.  ? Sweating. ? Shortness of breath. ? Nausea or vomiting. ? Pain, pressure, or a strange feeling in the back, neck, jaw, or upper belly or in one or both shoulders or arms. ? Lightheadedness or sudden weakness. ? A fast or irregular heartbeat. After you call 911, the  may tell you to chew 1 adult-strength or 2 to 4 low-dose aspirin. Wait for an ambulance. Do not try to drive yourself.    Watch closely for changes in your health, and be sure to contact your doctor if:    · Your lightheadedness gets worse or does not get better with home care. Where can you learn more? Go to http://bri-khari.info/. Enter Y573 in the search box to learn more about \"Lightheadedness or Faintness: Care Instructions. \"  Current as of: June 26, 2019  Content Version: 12.2  © 0325-5056 [employer]. Care instructions adapted under license by [employer] (which disclaims liability or warranty for this information). If you have questions about a medical condition or this instruction, always ask your healthcare professional. [address] any warranty or liability for your use of this information.

## 2020-01-14 NOTE — PROGRESS NOTES
PT DAILY TREATMENT NOTE 10-18    Patient Name: Paul Katz  Date:2020  : 1950  [x]  Patient  Verified  Payor: Sugey Bryan / Plan: [address] [place] [address] HMO / Product Type: Managed Care Medicare /    In time: 3:30   Out time: 4:26  Total Treatment Time (min): 56  Visit #: 4 of 12    Medicare/BCBS Only   Total Timed Codes (min): 41 1:1 Treatment Time: 17       Treatment Area: Pain in left knee [M25.562]  Unilateral primary osteoarthritis, left knee [M17.12]    SUBJECTIVE  Pain Level (0-10 scale): 6  Any medication changes, allergies to medications, adverse drug reactions, diagnosis change, or new procedure performed?: [x] No    [] Yes (see summary sheet for update)  Subjective functional status/changes:   [] No changes reported  Pt reports having increased B knee pain today. Pt states she was in the ED yesterday/last night for 12 hours secondary to feeling of passing out. Pt denies any current feelings of passing out currently but is stressed out with other issues.      OBJECTIVE    Modality rationale: decrease pain and increase tissue extensibility to improve the patients ability to increase ease with ADLs   Min Type Additional Details    [] Estim:  []Unatt       []IFC  []Premod                        []Other:  []w/ice   []w/heat  Position:  Location:    [] Estim: []Att    []TENS instruct  []NMES                    []Other:  []w/US   []w/ice   []w/heat  Position:  Location:    []  Traction: [] Cervical       []Lumbar                       [] Prone          []Supine                       []Intermittent   []Continuous Lbs:  [] before manual  [] after manual    []  Ultrasound: []Continuous   [] Pulsed                           []1MHz   []3MHz W/cm2:  Location:    []  Iontophoresis with dexamethasone         Location: [] Take home patch   [] In clinic   15 []  Ice     [x]  heat  []  Ice massage  []  Laser   []  Anodyne Position: supine with LEs on wedge  Location: B Knees    []  Laser with stim  []  Other:  Position:  Location:    []  Vasopneumatic Device Pressure:       [] lo [] med [] hi   Temperature: [] lo [] med [] hi   [x] Skin assessment post-treatment:  [x]intact []redness- no adverse reaction    []redness  adverse reaction:     41 min Therapeutic Exercise:  [x] See flow sheet :   Rationale: increase ROM and increase strength to improve the patients ability to increase ADL tolerance. With   [x] TE   [] TA   [] neuro   [] other: Patient Education: [x] Review HEP    [] Progressed/Changed HEP based on:   [] positioning   [] body mechanics   [] transfers   [] heat/ice application    [] other:      Other Objective/Functional Measures: did not progress exercises today secondary to subjective comments above. Pain Level (0-10 scale) post treatment: 4    ASSESSMENT/Changes in Function: Reported improvement in pain post session today in the knees. Pt continues to report moderate B knee pain levels. Good B hip EXT AROM noted with bridges today. We will plan on continuing therapy at this time to improve pain and mobility. Continue POC as tolerated. Patient will continue to benefit from skilled PT services to modify and progress therapeutic interventions, address functional mobility deficits, address ROM deficits, address strength deficits, analyze and cue movement patterns, analyze and modify body mechanics/ergonomics and instruct in home and community integration to attain remaining goals. []? See Plan of Care  []? See progress note/recertification  []? See Discharge Summary         Progress towards goals / Updated goals:  1. Pt will demonstrate an increased FOTO score to 54/100 in order to improve function.   2. Pt will demonstrate understanding/compliance with final HEP in order to allow for continued progression independently following DC   3. Pt will be able to ambulate 250ft continuously without AD/LE buckling/LOB/rest break in order to return to community ambulation and her daily walking regimen. Continues to report moderate levels of B knee pain 1/14/2020  4. Pt will improve left knee flexion strength to 4/5 in order to improve ease/ability with prolonged ambulation and stairs. Progressing.  1/2/20    PLAN  [x]  Upgrade activities as tolerated     [x]  Continue plan of care  [x]  Update interventions per flow sheet       []  Discharge due to:_  []  Other:_      Melyssa Kim, PT 1/14/2020  3:43 PM    Future Appointments   Date Time Provider Bharat Thakkar   1/14/2020  3:30 PM Suarez Milks, PT [facility]   1/16/2020  3:00 PM Suarez Milks, PT [facility]   1/21/2020  3:30 PM Adolfo Ramos, PTA [facility]

## 2020-01-16 ENCOUNTER — HOSPITAL ENCOUNTER (OUTPATIENT)
Dept: PHYSICAL THERAPY | Age: 70
Discharge: HOME | End: 2020-01-16
Payer: MEDICARE

## 2020-01-16 PROCEDURE — 97110 THERAPEUTIC EXERCISES: CPT

## 2020-01-16 NOTE — PROGRESS NOTES
PT DAILY TREATMENT NOTE 10-18    Patient Name: Nimco Gongora  Date:2020  : 1950  [x]  Patient  Verified  Payor: [employer]IC[employer] / Plan: [address] [place] [address] HMO / Product Type: Managed Care Medicare /    In time: 3:01   Out time: 4:00  Total Treatment Time (min): 61  Visit #: 5 of 12    Medicare/BCBS Only   Total Timed Codes (min): 44 1:1 Treatment Time: 35       Treatment Area: Pain in left knee [M25.562]  Unilateral primary osteoarthritis, left knee [M17.12]    SUBJECTIVE  Pain Level (0-10 scale): 5 right knee  Any medication changes, allergies to medications, adverse drug reactions, diagnosis change, or new procedure performed?: [x] No    [] Yes (see summary sheet for update)  Subjective functional status/changes:   [] No changes reported  Pt reports having minimal pain in the left knee today but states her right knee is hurting more recently with insidious onset.      OBJECTIVE    Modality rationale: decrease pain and increase tissue extensibility to improve the patients ability to increase ease with ADLs   Min Type Additional Details    [] Estim:  []Unatt       []IFC  []Premod                        []Other:  []w/ice   []w/heat  Position:  Location:    [] Estim: []Att    []TENS instruct  []NMES                    []Other:  []w/US   []w/ice   []w/heat  Position:  Location:    []  Traction: [] Cervical       []Lumbar                       [] Prone          []Supine                       []Intermittent   []Continuous Lbs:  [] before manual  [] after manual    []  Ultrasound: []Continuous   [] Pulsed                           []1MHz   []3MHz W/cm2:  Location:    []  Iontophoresis with dexamethasone         Location: [] Take home patch   [] In clinic   15 []  Ice     [x]  heat  []  Ice massage  []  Laser   []  Anodyne Position: supine with LEs on wedge  Location: B Knees    []  Laser with stim  []  Other:  Position:  Location:    []  Vasopneumatic Device Pressure:       [] lo [] med [] hi   Temperature: [] lo [] med [] hi   [x] Skin assessment post-treatment:  [x]intact []redness- no adverse reaction    []redness  adverse reaction:     44 min Therapeutic Exercise:  [x] See flow sheet :   Rationale: increase ROM and increase strength to improve the patients ability to increase ADL tolerance. With   [x] TE   [] TA   [] neuro   [] other: Patient Education: [x] Review HEP    [] Progressed/Changed HEP based on:   [] positioning   [] body mechanics   [] transfers   [] heat/ice application    [] other:      Other Objective/Functional Measures: Progressed exercises per flow sheet to improve strength and stability in the LEs. Pain Level (0-10 scale) post treatment: 4    ASSESSMENT/Changes in Function: Reported improvement in pain post session today. Needs cues to proper trunk positioning with s/l hip ABD (on both LEs). Reported having some mild pain with step ups on the right knee today but pt was able to perform exercise today. Educated pt to contact her MD regarding her right knee pain if this pain does not subside or if it worsens. We will plan on continuing therapy to improve strength, mobility, and pain. Continue POC as tolerated. Patient will continue to benefit from skilled PT services to modify and progress therapeutic interventions, address functional mobility deficits, address ROM deficits, address strength deficits, analyze and cue movement patterns, analyze and modify body mechanics/ergonomics and instruct in home and community integration to attain remaining goals. []? See Plan of Care  []? See progress note/recertification  []? See Discharge Summary         Progress towards goals / Updated goals:  1. Pt will demonstrate an increased FOTO score to 54/100 in order to improve function.   2. Pt will demonstrate understanding/compliance with final HEP in order to allow for continued progression independently following DC   3. Pt will be able to ambulate 250ft continuously without AD/LE buckling/LOB/rest break in order to return to community ambulation and her daily walking regimen. Continues to report moderate levels of B knee pain 1/14/2020  4. Pt will improve left knee flexion strength to 4/5 in order to improve ease/ability with prolonged ambulation and stairs. Progressing.  1/2/20    PLAN  [x]  Upgrade activities as tolerated     [x]  Continue plan of care  [x]  Update interventions per flow sheet       []  Discharge due to:_  []  Other:_      Yanick Deluna, PT 1/16/2020  3:09 PM    Future Appointments   Date Time Provider Bharat Thakkar   1/21/2020  3:30 PM Jessica Romo [facility]

## 2020-01-17 ENCOUNTER — APPOINTMENT (OUTPATIENT)
Dept: CT IMAGING | Age: 70
End: 2020-01-17
Attending: PHYSICIAN ASSISTANT
Payer: MEDICARE

## 2020-01-17 ENCOUNTER — HOSPITAL ENCOUNTER (EMERGENCY)
Age: 70
Discharge: HOME | End: 2020-01-17
Attending: EMERGENCY MEDICINE
Payer: MEDICARE

## 2020-01-17 VITALS
SYSTOLIC BLOOD PRESSURE: 146 MMHG | WEIGHT: 149 LBS | DIASTOLIC BLOOD PRESSURE: 77 MMHG | HEART RATE: 85 BPM | OXYGEN SATURATION: 96 % | TEMPERATURE: 98.7 F | HEIGHT: 64 IN | RESPIRATION RATE: 18 BRPM | BODY MASS INDEX: 25.44 KG/M2

## 2020-01-17 DIAGNOSIS — S00.03XA CONTUSION OF RIGHT TEMPOROFRONTAL SCALP, INITIAL ENCOUNTER: ICD-10-CM

## 2020-01-17 DIAGNOSIS — N30.00 ACUTE CYSTITIS WITHOUT HEMATURIA: ICD-10-CM
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
## 2020-01-17 LAB
APPEARANCE UR: ABNORMAL
BACTERIA URNS QL MICRO: ABNORMAL /HPF
BILIRUB UR QL: NEGATIVE
COLOR UR: YELLOW
EPITH CASTS URNS QL MICRO: ABNORMAL /LPF (ref 0–5)
GLUCOSE UR STRIP.AUTO-MCNC: NEGATIVE MG/DL
HGB UR QL STRIP: NEGATIVE
KETONES UR QL STRIP.AUTO: NEGATIVE MG/DL
LEUKOCYTE ESTERASE UR QL STRIP.AUTO: ABNORMAL
NITRITE UR QL STRIP.AUTO: NEGATIVE
PH UR STRIP: 5 [PH] (ref 5–8)
PROT UR STRIP-MCNC: NEGATIVE MG/DL
RBC #/AREA URNS HPF: NEGATIVE /HPF (ref 0–5)
SP GR UR REFRACTOMETRY: 1.02 (ref 1–1.03)
UROBILINOGEN UR QL STRIP.AUTO: 1 EU/DL (ref 0.2–1)
WBC URNS QL MICRO: ABNORMAL /HPF (ref 0–4)

## 2020-01-17 PROCEDURE — 87086 URINE CULTURE/COLONY COUNT: CPT

## 2020-01-17 PROCEDURE — 99283 EMERGENCY DEPT VISIT LOW MDM: CPT

## 2020-01-17 PROCEDURE — 72125 CT NECK SPINE W/O DYE: CPT

## 2020-01-17 PROCEDURE — 81001 URINALYSIS AUTO W/SCOPE: CPT

## 2020-01-17 PROCEDURE — 70450 CT HEAD/BRAIN W/O DYE: CPT

## 2020-01-17 RX ORDER — NITROFURANTOIN 25; 75 MG/1; MG/1
100 CAPSULE ORAL 2 TIMES DAILY
Qty: 10 CAP | Refills: 0 | OUTPATIENT
Start: 2020-01-17 | End: 2020-01-17

## 2020-01-17 RX ORDER — NITROFURANTOIN 25; 75 MG/1; MG/1
100 CAPSULE ORAL 2 TIMES DAILY
Qty: 10 CAP | Refills: 0 | Status: SHIPPED | OUTPATIENT
Start: 2020-01-17 | End: 2020-01-22

## 2020-01-17 NOTE — DISCHARGE INSTRUCTIONS
Preventing Falls: Care Instructions  Your Care Instructions    Getting around your home safely can be a challenge if you have injuries or health problems that make it easy for you to fall. Loose rugs and furniture in walkways are among the dangers for many older people who have problems walking or who have poor eyesight. People who have conditions such as arthritis, osteoporosis, or dementia also have to be careful not to fall. You can make your home safer with a few simple measures. Follow-up care is a key part of your treatment and safety. Be sure to make and go to all appointments, and call your doctor if you are having problems. It's also a good idea to know your test results and keep a list of the medicines you take. How can you care for yourself at home? Taking care of yourself  · You may get dizzy if you do not drink enough water. To prevent dehydration, drink plenty of fluids, enough so that your urine is light yellow or clear like water. Choose water and other caffeine-free clear liquids. If you have kidney, heart, or liver disease and have to limit fluids, talk with your doctor before you increase the amount of fluids you drink. · Exercise regularly to improve your strength, muscle tone, and balance. Walk if you can. Swimming may be a good choice if you cannot walk easily. · Have your vision and hearing checked each year or any time you notice a change. If you have trouble seeing and hearing, you might not be able to avoid objects and could lose your balance. · Know the side effects of the medicines you take. Ask your doctor or pharmacist whether the medicines you take can affect your balance. Sleeping pills or sedatives can affect your balance. · Limit the amount of alcohol you drink. Alcohol can impair your balance and other senses. · Ask your doctor whether calluses or corns on your feet need to be removed.  If you wear loose-fitting shoes because of calluses or corns, you can lose your balance and fall. · Talk to your doctor if you have numbness in your feet. Preventing falls at home  · Remove raised doorway thresholds, throw rugs, and clutter. Repair loose carpet or raised areas in the floor. · Move furniture and electrical cords to keep them out of walking paths. · Use nonskid floor wax, and wipe up spills right away, especially on ceramic tile floors. · If you use a walker or cane, put rubber tips on it. If you use crutches, clean the bottoms of them regularly with an abrasive pad, such as steel wool. · Keep your house well lit, especially Yoselin Selvin, and outside walkways. Use night-lights in areas such as hallways and bathrooms. Add extra light switches or use remote switches (such as switches that go on or off when you clap your hands) to make it easier to turn lights on if you have to get up during the night. · Install sturdy handrails on stairways. · Move items in your cabinets so that the things you use a lot are on the lower shelves (about waist level). · Keep a cordless phone and a flashlight with new batteries by your bed. If possible, put a phone in each of the main rooms of your house, or carry a cell phone in case you fall and cannot reach a phone. Or, you can wear a device around your neck or wrist. You push a button that sends a signal for help. · Wear low-heeled shoes that fit well and give your feet good support. Use footwear with nonskid soles. Check the heels and soles of your shoes for wear. Repair or replace worn heels or soles. · Do not wear socks without shoes on wood floors. · Walk on the grass when the sidewalks are slippery. If you live in an area that gets snow and ice in the winter, sprinkle salt on slippery steps and sidewalks. Preventing falls in the bath  · Install grab bars and nonskid mats inside and outside your shower or tub and near the toilet and sinks. · Use shower chairs and bath benches.   · Use a hand-held shower head that will allow you to sit while showering. · Get into a tub or shower by putting the weaker leg in first. Get out of a tub or shower with your strong side first.  · Repair loose toilet seats and consider installing a raised toilet seat to make getting on and off the toilet easier. · Keep your bathroom door unlocked while you are in the shower. Where can you learn more? Go to http://bri-khari.info/. Enter [id number] [id number] in the search box to learn more about \"Preventing Falls: Care Instructions. \"  Current as of: November 7, 2018  Content Version: 12.2  © 7070-9242 [email or web address]. Care instructions adapted under license by [employer] (which disclaims liability or warranty for this information). If you have questions about a medical condition or this instruction, always ask your healthcare professional. [place] [address] any warranty or liability for your use of this information. Urinary Tract Infection in Women: Care Instructions  Your Care Instructions    A urinary tract infection, or UTI, is a general term for an infection anywhere between the kidneys and the urethra (where urine comes out). Most UTIs are bladder infections. They often cause pain or burning when you urinate. UTIs are caused by bacteria and can be cured with antibiotics. Be sure to complete your treatment so that the infection goes away. Follow-up care is a key part of your treatment and safety. Be sure to make and go to all appointments, and call your doctor if you are having problems. It's also a good idea to know your test results and keep a list of the medicines you take. How can you care for yourself at home? · Take your antibiotics as directed. Do not stop taking them just because you feel better. You need to take the full course of antibiotics. · Drink extra water and other fluids for the next day or two. This may help wash out the bacteria that are causing the infection.  (If you have kidney, heart, or liver disease and have to limit fluids, talk with your doctor before you increase your fluid intake.)  · Avoid drinks that are carbonated or have caffeine. They can irritate the bladder. · Urinate often. Try to empty your bladder each time. · To relieve pain, take a hot bath or lay a heating pad set on low over your lower belly or genital area. Never go to sleep with a heating pad in place. To prevent UTIs  · Drink plenty of water each day. This helps you urinate often, which clears bacteria from your system. (If you have kidney, heart, or liver disease and have to limit fluids, talk with your doctor before you increase your fluid intake.)  · Urinate when you need to. · Urinate right after you have sex. · Change sanitary pads often. · Avoid douches, bubble baths, feminine hygiene sprays, and other feminine hygiene products that have deodorants. · After going to the bathroom, wipe from front to back. When should you call for help? Call your doctor now or seek immediate medical care if:    · Symptoms such as fever, chills, nausea, or vomiting get worse or appear for the first time.     · You have new pain in your back just below your rib cage. This is called flank pain.     · There is new blood or pus in your urine.     · You have any problems with your antibiotic medicine.    Watch closely for changes in your health, and be sure to contact your doctor if:    · You are not getting better after taking an antibiotic for 2 days.     · Your symptoms go away but then come back. Where can you learn more? Go to http://bri-khari.info/. Enter D485 in the search box to learn more about \"Urinary Tract Infection in Women: Care Instructions. \"  Current as of: December 19, 2018  Content Version: 12.2  © 6035-3949 [employer]. Care instructions adapted under license by [employer] (which disclaims liability or warranty for this information).  If you have questions about a medical condition or this instruction, always ask your healthcare professional. [address] any warranty or liability for your use of this information. Patient Education     Contusion: Care Instructions  Your Care Instructions  Contusion is the medical term for a bruise. It is the result of a direct blow or an impact, such as a fall. Contusions are common sports injuries. Most people think of a bruise as a black-and-blue spot. This happens when small blood vessels get torn and leak blood under the skin. But bones, muscles, and organs can also get bruised. This may damage deep tissues but not cause a bruise you can see. The doctor will do a physical exam to find the location of your contusion. You may also have tests to make sure you do not have a more serious injury, such as a broken bone or nerve damage. These may include X-rays or other imaging tests like a CT scan or MRI. Deep-tissue contusions may cause pain and swelling. But if there is no serious damage, they will often get better in a few weeks with home treatment. The doctor has checked you carefully, but problems can develop later. If you notice any problems or new symptoms, get medical treatment right away. Follow-up care is a key part of your treatment and safety. Be sure to make and go to all appointments, and call your doctor if you are having problems. It's also a good idea to know your test results and keep a list of the medicines you take. How can you care for yourself at home? · Put ice or a cold pack on the sore area for 10 to 20 minutes at a time to stop swelling. Put a thin cloth between the ice pack and your skin. · Be safe with medicines. Read and follow all instructions on the label. ¨ If the doctor gave you a prescription medicine for pain, take it as prescribed. ¨ If you are not taking a prescription pain medicine, ask your doctor if you can take an over-the-counter medicine.   · If you can, prop up the sore area on pillows as much as possible for the next few days. Try to keep the sore area above the level of your heart. When should you call for help? Call your doctor now or seek immediate medical care if:  · Your pain gets worse. · You have new or worse swelling. · You have tingling, weakness, or numbness in the area near the contusion. · The area near the contusion is cold or pale. Watch closely for changes in your health, and be sure to contact your doctor if:  · You do not get better as expected. Where can you learn more? Go to [email or web address]  Enter [id number] in the search box to learn more about \"Contusion: Care Instructions. \"   © 5826-4770 [employer]. Care instructions adapted under license by [facility] (which disclaims liability or warranty for this information). This care instruction is for use with your licensed healthcare professional. If you have questions about a medical condition or this instruction, always ask your healthcare professional. [place] [address] any warranty or liability for your use of this information.   Content Version: 85.1.571586; Current as of: May 22, 2015

## 2020-01-17 NOTE — ED PROVIDER NOTES
EMERGENCY DEPARTMENT HISTORY AND PHYSICAL EXAM    Date: 1/17/2020  Patient Name: Vivek Roche    History of Presenting Illness     Chief Complaint   Patient presents with    Fall    Head Injury    Urinary Frequency     History Provided By: patient  Chief Complaint: fall - hit R side of head on nightstand, increased urinary frequency  Duration: today  Location: R temporal scalp  Severity: denies pain  Modifying Factors:   Associated Symptoms: none     Additional History (Context): Vivek Roche is a 78 y.o. female with a history of depression, HTN, and HLD who presents to the ED for evaluation of R sided scalp contusion after she rolled out of her bed last night and struck her head on her nightstand. Pt denies LOC, nausea, vomiting, vision changes, true headache (only tender over where she struck her head), severe neck pain (she states that she typically has neck pain, but that she feels like the left side of her neck is more painful s/p fall), slurred speech, and difficulty ambulating. Pt also presents with dysuria and increased urinary frequency that began yesterday. No abdominal pain, hematuria, flank pain, or fever/chills. No blood thinner usage. PCP: Danny Azul NP    Current Outpatient Medications   Medication Sig Dispense Refill    nitrofurantoin, macrocrystal-monohydrate, (MACROBID) 100 mg capsule Take 1 Cap by mouth two (2) times a day for 5 days. 10 Cap 0    sertraline (ZOLOFT) 100 mg tablet Take 100 mg by mouth daily.  diclofenac (VOLTAREN) 1 % gel Apply 4 g to affected area four (4) times daily. 100 g 0    amLODIPine (NORVASC) 10 mg tablet Take 10 mg by mouth daily.  aspirin 81 mg chewable tablet Take 81 mg by mouth daily.  LORazepam (ATIVAN) 0.5 mg tablet Take 1 Tab by mouth daily as needed for Anxiety.  4 Tab 0     Past History     Past Medical History:  Past Medical History:   Diagnosis Date    Depression     High cholesterol     Hypertension      Past Surgical History:  History reviewed. No pertinent surgical history. Family History:  History reviewed. No pertinent family history. Social History:  Social History     Tobacco Use    Smoking status: Never Smoker    Smokeless tobacco: Never Used   Substance Use Topics    Alcohol use: Not Currently    Drug use: Never     Allergies: Allergies   Allergen Reactions    Aleve [Naproxen Sodium] Unable to Obtain    Bactrim [Sulfamethoprim] Diarrhea    Diphenhydramine Drowsiness     Patient states \"It makes me sleepy\"    Feldene [Piroxicam] Unknown (comments)     Shaking      Penicillins Rash     Review of Systems   Review of Systems   Constitutional: Negative for chills and fever. Eyes: Negative for photophobia and visual disturbance. Respiratory: Negative for cough and shortness of breath. Cardiovascular: Negative for chest pain and palpitations. Gastrointestinal: Negative for nausea and vomiting. Genitourinary: Positive for dysuria and urgency. Negative for flank pain and hematuria. Musculoskeletal: Positive for neck pain (baseline, but L worsened since fall). Negative for back pain and gait problem. Skin: Negative for rash and wound. Neurological: Negative for seizures, syncope, facial asymmetry, speech difficulty, weakness, light-headedness and headaches. All other systems reviewed and are negative. All Other Systems Negative  Physical Exam     Vitals:    01/17/20 1045   BP: 146/77   Pulse: 85   Resp: 18   Temp: 98.7 °F (37.1 °C)   SpO2: 96%   Weight: 67.6 kg (149 lb)   Height: 5' 4\" (1.626 m)     Physical Exam  Vitals signs and nursing note reviewed. Constitutional:       General: She is not in acute distress. Appearance: Normal appearance. She is normal weight. She is not ill-appearing or toxic-appearing. HENT:      Head:      Comments: There is a small hematoma present over R temporal scalp that is mildly tender to palpation. No periorbital swelling noted.   No brooks signs or raccoon eyes. Right Ear: Tympanic membrane and external ear normal.      Left Ear: Tympanic membrane and external ear normal.      Ears:      Comments: No hemotympanum. Nose: Nose normal.      Comments: No septal hematoma. Mouth/Throat:      Mouth: Mucous membranes are moist.      Pharynx: Oropharynx is clear. Eyes:      Extraocular Movements: Extraocular movements intact. Conjunctiva/sclera: Conjunctivae normal.      Pupils: Pupils are equal, round, and reactive to light. Comments: No signs of entrapment. Neck:      Musculoskeletal: Normal range of motion and neck supple. No neck rigidity. Comments: Patient has full ROM of her neck. No midline tenderness to palpation. There is some mild BL pericervical muscle tenderness to palpation (L > R). No obvious step-offs or deformities. Cardiovascular:      Rate and Rhythm: Normal rate and regular rhythm. Heart sounds: Normal heart sounds. No murmur. No friction rub. No gallop. Pulmonary:      Effort: Pulmonary effort is normal. No respiratory distress. Breath sounds: Normal breath sounds. No wheezing. Abdominal:      Palpations: Abdomen is soft. Tenderness: There is no tenderness. There is no right CVA tenderness or left CVA tenderness. Musculoskeletal: Normal range of motion. General: No deformity. Skin:     General: Skin is warm and dry. Comments: No open wounds or abrasions. Neurological:      General: No focal deficit present. Mental Status: She is alert and oriented to person, place, and time. Cranial Nerves: No cranial nerve deficit. Sensory: No sensory deficit. Motor: No weakness. Coordination: Coordination normal.      Gait: Gait normal.      Comments: Gait is steady. Pt ambulates with ease. No focal neurological deficits noted. No pronator drift. Heel to shin WNL. No slurred speech.    Psychiatric:         Mood and Affect: Mood normal.         Behavior: Behavior normal. Diagnostic Study Results     Labs -     Recent Results (from the past 12 hour(s))   URINALYSIS W/ RFLX MICROSCOPIC    Collection Time: 01/17/20 11:41 AM   Result Value Ref Range    Color YELLOW      Appearance CLOUDY      Specific gravity 1.020 1.005 - 1.030      pH (UA) 5.0 5.0 - 8.0      Protein NEGATIVE  NEG mg/dL    Glucose NEGATIVE  NEG mg/dL    Ketone NEGATIVE  NEG mg/dL    Bilirubin NEGATIVE  NEG      Blood NEGATIVE  NEG      Urobilinogen 1.0 0.2 - 1.0 EU/dL    Nitrites NEGATIVE  NEG      Leukocyte Esterase MODERATE (A) NEG     URINE MICROSCOPIC ONLY    Collection Time: 01/17/20 11:41 AM   Result Value Ref Range    WBC 11 to 20 0 - 4 /hpf    RBC NEGATIVE  0 - 5 /hpf    Epithelial cells 2+ 0 - 5 /lpf    Bacteria 1+ (A) NEG /hpf     Radiologic Studies -   CT SPINE CERV WO CONT   Final Result   Impression:      1. No acute fracture or subluxation. Thank you for this referral.      CT HEAD WO CONT   Final Result   Impression:      1. No acute intracranial pathology. 2. Mild sequela of presumed chronic small vessel ischemic disease. 3. Right frontotemporal scalp contusion. CT Results  (Last 48 hours)               01/17/20 1226  CT SPINE CERV WO CONT Final result    Impression:  Impression:       1. No acute fracture or subluxation. Thank you for this referral.       Narrative:  Cervical spine CT without contrast.        CPT code: 79052        Indications: Fall out of bed and hit head with scalp hematoma       Technique: Contiguous 2.5 mm axial images were obtained from the skull base   through T1. From these, sagittal and coronal reconstructions were generated.        CT scans at this facility are performed using dose optimization technique as   appropriate with performed exam, to include automated exposure control,   adjustment of mA and/or kV according to patient's size (including appropriate   matching for site-specific examinations), or use of iterative reconstruction technique. Comparison:       Findings: There is slight reversal lordosis which is likely positional. Grade 1   retrolisthesis of C4 on C5 and C5 on C6 is likely degenerative. No acute   fracture or traumatic subluxation. There is mild multilevel degenerative disc   disease which is most significant at C4-C5 and C5-C6. There is associated facet   arthrosis. Limited imaging of skull base unremarkable. Other: Unremarkable. 01/17/20 1225  CT HEAD WO CONT Final result    Impression:  Impression:       1. No acute intracranial pathology. 2. Mild sequela of presumed chronic small vessel ischemic disease. 3. Right frontotemporal scalp contusion. Narrative:  CT brain without enhancement        CPT code: 88501       Indication: Fall out of bed and hit right temporal side of head on nightstand. Soft tissue swelling. Worsening dizziness. Technique: Unenhanced 5 mm collimation axial images obtained from the skull base   through the vertex. Dose reduction techniques used: Automated exposure control, adjustment of the   mAs and/or kVp according to patient size, standardized low-dose protocol, and/or   iterative reconstruction technique. Comparison: January 13, 2020. Findings: There is no intracranial hemorrhage. There is no evidence for mass. The gray-white matter differentiation is acutely preserved. There are mild white   matter hypodensities. No extra-axial fluid collections. The ventricles are   symmetric and midline in position. Vascular structures are symmetric in   attenuation. Basilar cisterns are patent. Sinuses: Clear. Orbits: Normal.   Calvarium:Right frontotemporal contusion. CXR Results  (Last 48 hours)    None        Medical Decision Making   I am the first provider for this patient.     I reviewed the vital signs, available nursing notes, past medical history, past surgical history, family history and social history. Vital Signs-Reviewed the patient's vital signs. Records Reviewed: Nursing Notes and Old Medical Records     Procedures: None     Provider Notes (Medical Decision Making): Patient is a 74-year-old female with a history of hypertension and hyperlipidemia who presents to the emergency department after she rolled out of bed last night striking the right side of her head on her nightstand. No loss of consciousness, vision changes, blood thinner usage, severe neck pain, severe head pain, nausea/vomiting, or difficulty ambulating. Patient is also here for dysuria and increased urinary frequency that began yesterday. Vital signs are stable. Physical exam is notable for a mild hematoma present to the right temporal scalp. Patient also exhibits some mild bilateral pericervical muscle tenderness to palpation. Patient is neurologically intact without any focal neurological deficits noted. Patient is not on blood thinners. Due to the nature of patient's fall, will obtain CT head without contrast as well as CT cervical spine without contrast to rule out any sort of intracranial abnormality or neck fracture. Will obtain urinalysis to look for urinary tract infection. Will continue to monitor and evaluate the patient. 12:08 PM  UA shows moderate leuks and 11-20 WBCs. No nitrites or blood. Because patient is symptomatic, will treat and culture. 12:49 PM  CT head wo contrast does not show any acute intracranial abnormality. It does show a mild R frontotemporal scalp contusion. Pending CT cervical spine results. 1:05 PM  CT cervical spine also negative. Will discharge patient home with prescription for macrobid for her UTI, as she is allergic to bactrim and penicillins and is unsure whether or not she has teken keflex in the past. Instructed patient to take tylenol/motrin for her pain if she feels that she needs it.  Also instructed patient to follow-up with her pcp on Monday if her symptoms worsen, and to return to the ED for any severe worsening of symptoms including gait abnormality, focal weakness, severe headache, severe neck pain, and vision changes. Patient and daughter at bedside verbalized their agreement and understanding to this plan. MED RECONCILIATION:  No current facility-administered medications for this encounter. Current Outpatient Medications   Medication Sig    nitrofurantoin, macrocrystal-monohydrate, (MACROBID) 100 mg capsule Take 1 Cap by mouth two (2) times a day for 5 days.  sertraline (ZOLOFT) 100 mg tablet Take 100 mg by mouth daily.  diclofenac (VOLTAREN) 1 % gel Apply 4 g to affected area four (4) times daily.  amLODIPine (NORVASC) 10 mg tablet Take 10 mg by mouth daily.  aspirin 81 mg chewable tablet Take 81 mg by mouth daily.  LORazepam (ATIVAN) 0.5 mg tablet Take 1 Tab by mouth daily as needed for Anxiety. Disposition:  Home     DISCHARGE NOTE:   Pt has been reexamined. Patient has no new complaints, changes, or physical findings. Care plan outlined and precautions discussed. Results of workup were reviewed with the patient. All medications were reviewed with the patient. All of pt's questions and concerns were addressed. Patient was instructed and agrees to follow up with PCP as well as to return to the ED upon further deterioration. Patient is ready to go home. Follow-up Information     Follow up With Specialties Details Why Contact Magdi Colby NP Nurse Practitioner In 3 days If symptoms worsen [address] [facility]      [id number] [facility] EMERGENCY DEPT Emergency Medicine  As needed, If symptoms worsen [address] [facility]  [phone]        Current Discharge Medication List      START taking these medications    Details   nitrofurantoin, macrocrystal-monohydrate, (MACROBID) 100 mg capsule Take 1 Cap by mouth two (2) times a day for 5 days.   Qty: 10 Cap, Refills: 0 Diagnosis     Clinical Impression:   1. Fall, initial encounter    2. Acute cystitis without hematuria    3. Contusion of right temporofrontal scalp, initial encounter        \"Please note that this dictation was completed with [employer], the computer voice recognition software. Quite often unanticipated grammatical, syntax, homophones, and other interpretive errors are inadvertently transcribed by the computer software. Please disregard these errors. Please excuse any errors that have escaped final proofreading. \"

## 2020-01-17 NOTE — PROGRESS NOTES
conducted an initial consultation and Spiritual Assessment for The [employer] Concha, who is a 78 y.o.,female. Patients Primary Language is: [place]. According to the patients EMR Anabaptist Affiliation is: Other. The reason the Patient came to the hospital is: There are no active problems to display for this patient. The  provided the following Interventions:  Initiated a relationship of care and support. Explored issues of vicenta, belief, spirituality and Zoroastrianism/ritual needs while hospitalized. Listened empathically. Provided chaplaincy education. Provided information about Spiritual Care Services. Offered assurance of prayers on patient's behalf. Chart reviewed. The following outcomes where achieved:  Patient shared limited information about both their medical narrative and spiritual journey/beliefs.  confirmed Patient's Anabaptist Affiliation. Patient processed feeling about current hospitalization. Patient expressed gratitude for 's visit. Assessment:  Patient does not have any Zoroastrianism/cultural needs that will affect patients preferences in health care. There are no spiritual or Zoroastrianism issues which require intervention at this time. Plan:  Chaplains will continue to follow and will provide pastoral care on an as needed/requested basis.  recommends bedside caregivers page  on duty if patient shows signs of acute spiritual or emotional distress.     [address]   [phone]

## 2020-01-17 NOTE — ED TRIAGE NOTES
Pt presents with injury to head that occurred last pm and urinary frequency that started yesterday. Per pt rolled out of bed last night hitting right temporal side of head on nightstand, skin intact, some swelling and bruising present. Denies LOC, no vision changes, states has been having dizziness for a while, reports no worsening in dizziness since hitting head.

## 2020-01-19 LAB
BACTERIA SPEC CULT: NORMAL
SERVICE CMNT-IMP: NORMAL

## 2020-01-21 ENCOUNTER — HOSPITAL ENCOUNTER (OUTPATIENT)
Dept: PHYSICAL THERAPY | Age: 70
Discharge: HOME | End: 2020-01-21
Payer: MEDICARE

## 2020-01-21 PROCEDURE — 97110 THERAPEUTIC EXERCISES: CPT

## 2020-01-21 NOTE — PROGRESS NOTES
PT DAILY TREATMENT NOTE 10-18    Patient Name: Emilee Hill  Date:2020  : 1950  [x]  Patient  Verified  Payor: BLUE CROSS MEDICARE / Plan: [address] [place] [address] HMO / Product Type: Managed Care Medicare /    In time:330  Out time:420  Total Treatment Time (min): 50  Visit #: 6 of 12    Medicare/BCBS Only   Total Timed Codes (min):  40 1:1 Treatment Time:  40       Treatment Area: Pain in left knee [M25.562]  Unilateral primary osteoarthritis, left knee [M17.12]    SUBJECTIVE  Pain Level (0-10 scale): 5  Any medication changes, allergies to medications, adverse drug reactions, diagnosis change, or new procedure performed?: [x] No    [] Yes (see summary sheet for update)  Subjective functional status/changes:   [] No changes reported  Patient reported has a HA and left knee pain    OBJECTIVE    Modality rationale: decrease pain to improve the patients ability to perform ADLs   Min Type Additional Details    [] Estim:  []Unatt       []IFC  []Premod                        []Other:  []w/ice   []w/heat  Position:  Location:    [] Estim: []Att    []TENS instruct  []NMES                    []Other:  []w/US   []w/ice   []w/heat  Position:  Location:    []  Traction: [] Cervical       []Lumbar                       [] Prone          []Supine                       []Intermittent   []Continuous Lbs:  [] before manual  [] after manual    []  Ultrasound: []Continuous   [] Pulsed                           []1MHz   []3MHz W/cm2:  Location:    []  Iontophoresis with dexamethasone         Location: [] Take home patch   [] In clinic   10 []  Ice     [x]  heat  []  Ice massage  []  Laser   []  Anodyne Position:supine with B LE elevated on wedge  Location:B knees     []  Laser with stim  []  Other:  Position:  Location:    []  Vasopneumatic Device Pressure:       [] lo [] med [] hi   Temperature: [] lo [] med [] hi   [] Skin assessment post-treatment:  []intact []redness- no adverse reaction    []redness  adverse reaction:       40 min Therapeutic Exercise:  [x] See flow sheet :   Rationale: increase ROM and increase strength to improve the patients ability to perform ADLs              With   [] TE   [] TA   [] neuro   [] other: Patient Education: [x] Review HEP    [] Progressed/Changed HEP based on:   [] positioning   [] body mechanics   [] transfers   [] heat/ice application    [] other:      Other Objective/Functional Measures: progressed therex per flow sheet     Pain Level (0-10 scale) post treatment: 4    ASSESSMENT/Changes in Function: patient tolerated progressed therex well without c/o increased pain. Patient will continue to benefit from skilled PT services to modify and progress therapeutic interventions, address functional mobility deficits, address ROM deficits, address strength deficits, analyze and address soft tissue restrictions and analyze and cue movement patterns to attain remaining goals. [x]  See Plan of Care  []  See progress note/recertification  []  See Discharge Summary         Progress towards goals / Updated goals:  1. Pt will demonstrate an increased FOTO score to 54/100 in order to improve function. 2. Pt will demonstrate understanding/compliance with final HEP in order to allow for continued progression independently following DC   3. Pt will be able to ambulate 250ft continuously without AD/LE buckling/LOB/rest break in order to return to community ambulation and her daily walking regimen. Continues to report moderate levels of B knee pain 1/14/2020  4. Pt will improve left knee flexion strength to 4/5 in order to improve ease/ability with prolonged ambulation and stairs.   Progressing.  1/2/20    PLAN  []  Upgrade activities as tolerated     [x]  Continue plan of care  []  Update interventions per flow sheet       []  Discharge due to:_  []  Other:_      Hannah Santos PTA 1/21/2020  3:44 PM    Future Appointments   Date Time Provider Bharat Thakkar   2/4/2020  2:30 PM Genesveta Staff, PT [facility]   2/6/2020  2:30 PM Gonzalez Colon, PTA [facility]   2/11/2020  2:30 PM Genesveta Staff, [facility]   2/13/2020  2:30 PM Gonzalez Colon, PTA [facility]   2/18/2020  2:30 PM Gonzalez Colon, PTA [facility]   2/20/2020  2:30 PM Gonzalez Colon, PTA [facility]   2/25/2020  2:30 PM Gonzalez Colon, PTA [facility]   2/27/2020  2:30 PM Nash Staff, PT [facility]

## 2020-01-26 ENCOUNTER — ANESTHESIA EVENT (OUTPATIENT)
Dept: ENDOSCOPY | Age: 70
End: 2020-01-26
Payer: MEDICARE

## 2020-01-27 ENCOUNTER — ANESTHESIA (OUTPATIENT)
Dept: ENDOSCOPY | Age: 70
End: 2020-01-27
Payer: MEDICARE

## 2020-01-27 ENCOUNTER — HOSPITAL ENCOUNTER (OUTPATIENT)
Age: 70
Setting detail: OUTPATIENT SURGERY
Discharge: HOME | End: 2020-01-27
Attending: INTERNAL MEDICINE | Admitting: INTERNAL MEDICINE
Payer: MEDICARE

## 2020-01-27 VITALS
HEIGHT: 64 IN | RESPIRATION RATE: 8 BRPM | WEIGHT: 143.4 LBS | HEART RATE: 78 BPM | SYSTOLIC BLOOD PRESSURE: 119 MMHG | OXYGEN SATURATION: 96 % | DIASTOLIC BLOOD PRESSURE: 49 MMHG | BODY MASS INDEX: 24.48 KG/M2 | TEMPERATURE: 97.8 F

## 2020-01-27 PROCEDURE — 76060000032 HC ANESTHESIA 0.5 TO 1 HR: Performed by: INTERNAL MEDICINE

## 2020-01-27 PROCEDURE — 74011250636 HC RX REV CODE- 250/636: Performed by: NURSE ANESTHETIST, CERTIFIED REGISTERED

## 2020-01-27 PROCEDURE — 77030021593 HC FCPS BIOP ENDOSC BSC -A: Performed by: INTERNAL MEDICINE

## 2020-01-27 PROCEDURE — 88305 TISSUE EXAM BY PATHOLOGIST: CPT

## 2020-01-27 PROCEDURE — 77030008565 HC TBNG SUC IRR ERBE -B: Performed by: INTERNAL MEDICINE

## 2020-01-27 PROCEDURE — 77030018846 HC SOL IRR STRL H20 ICUM -A: Performed by: INTERNAL MEDICINE

## 2020-01-27 PROCEDURE — 74011000250 HC RX REV CODE- 250: Performed by: NURSE ANESTHETIST, CERTIFIED REGISTERED

## 2020-01-27 PROCEDURE — 77030019988 HC FCPS ENDOSC DISP BSC -B: Performed by: INTERNAL MEDICINE

## 2020-01-27 PROCEDURE — 77030013992 HC SNR POLYP ENDOSC BSC -B: Performed by: INTERNAL MEDICINE

## 2020-01-27 PROCEDURE — 76040000007: Performed by: INTERNAL MEDICINE

## 2020-01-27 RX ORDER — SODIUM CHLORIDE 0.9 % (FLUSH) 0.9 %
5-40 SYRINGE (ML) INJECTION EVERY 8 HOURS
Status: CANCELLED | OUTPATIENT
Start: 2020-01-27

## 2020-01-27 RX ORDER — FLUMAZENIL 0.1 MG/ML
0.2 INJECTION INTRAVENOUS
Status: CANCELLED | OUTPATIENT
Start: 2020-01-27 | End: 2020-01-27

## 2020-01-27 RX ORDER — LIDOCAINE HYDROCHLORIDE 20 MG/ML
INJECTION, SOLUTION EPIDURAL; INFILTRATION; INTRACAUDAL; PERINEURAL AS NEEDED
Status: DISCONTINUED | OUTPATIENT
Start: 2020-01-27 | End: 2020-01-27 | Stop reason: HOSPADM

## 2020-01-27 RX ORDER — ONDANSETRON 2 MG/ML
4 INJECTION INTRAMUSCULAR; INTRAVENOUS ONCE
Status: CANCELLED | OUTPATIENT
Start: 2020-01-27 | End: 2020-01-27

## 2020-01-27 RX ORDER — DEXTROMETHORPHAN/PSEUDOEPHED 2.5-7.5/.8
1.2 DROPS ORAL
Status: CANCELLED | OUTPATIENT
Start: 2020-01-27

## 2020-01-27 RX ORDER — SODIUM CHLORIDE 0.9 % (FLUSH) 0.9 %
5-40 SYRINGE (ML) INJECTION AS NEEDED
Status: CANCELLED | OUTPATIENT
Start: 2020-01-27

## 2020-01-27 RX ORDER — SODIUM CHLORIDE 0.9 % (FLUSH) 0.9 %
5-40 SYRINGE (ML) INJECTION AS NEEDED
Status: DISCONTINUED | OUTPATIENT
Start: 2020-01-27 | End: 2020-01-27 | Stop reason: HOSPADM

## 2020-01-27 RX ORDER — PROPOFOL 10 MG/ML
INJECTION, EMULSION INTRAVENOUS AS NEEDED
Status: DISCONTINUED | OUTPATIENT
Start: 2020-01-27 | End: 2020-01-27 | Stop reason: HOSPADM

## 2020-01-27 RX ORDER — SODIUM CHLORIDE 0.9 % (FLUSH) 0.9 %
5-40 SYRINGE (ML) INJECTION EVERY 8 HOURS
Status: DISCONTINUED | OUTPATIENT
Start: 2020-01-27 | End: 2020-01-27 | Stop reason: HOSPADM

## 2020-01-27 RX ORDER — NALOXONE HYDROCHLORIDE 0.4 MG/ML
0.4 INJECTION, SOLUTION INTRAMUSCULAR; INTRAVENOUS; SUBCUTANEOUS
Status: CANCELLED | OUTPATIENT
Start: 2020-01-27 | End: 2020-01-27

## 2020-01-27 RX ORDER — DEXMEDETOMIDINE HYDROCHLORIDE 100 UG/ML
INJECTION, SOLUTION INTRAVENOUS AS NEEDED
Status: DISCONTINUED | OUTPATIENT
Start: 2020-01-27 | End: 2020-01-27 | Stop reason: HOSPADM

## 2020-01-27 RX ORDER — LIDOCAINE HYDROCHLORIDE 10 MG/ML
0.1 INJECTION, SOLUTION EPIDURAL; INFILTRATION; INTRACAUDAL; PERINEURAL AS NEEDED
Status: DISCONTINUED | OUTPATIENT
Start: 2020-01-27 | End: 2020-01-27 | Stop reason: HOSPADM

## 2020-01-27 RX ORDER — EPINEPHRINE 0.1 MG/ML
1 INJECTION INTRACARDIAC; INTRAVENOUS
Status: CANCELLED | OUTPATIENT
Start: 2020-01-27 | End: 2020-01-28

## 2020-01-27 RX ORDER — SODIUM CHLORIDE, SODIUM LACTATE, POTASSIUM CHLORIDE, CALCIUM CHLORIDE 600; 310; 30; 20 MG/100ML; MG/100ML; MG/100ML; MG/100ML
75 INJECTION, SOLUTION INTRAVENOUS CONTINUOUS
Status: DISCONTINUED | OUTPATIENT
Start: 2020-01-27 | End: 2020-01-27 | Stop reason: HOSPADM

## 2020-01-27 RX ORDER — ATROPINE SULFATE 0.1 MG/ML
0.5 INJECTION INTRAVENOUS
Status: CANCELLED | OUTPATIENT
Start: 2020-01-27 | End: 2020-01-28

## 2020-01-27 RX ORDER — INSULIN LISPRO 100 [IU]/ML
INJECTION, SOLUTION INTRAVENOUS; SUBCUTANEOUS ONCE
Status: DISCONTINUED | OUTPATIENT
Start: 2020-01-27 | End: 2020-01-27 | Stop reason: HOSPADM

## 2020-01-27 RX ADMIN — PROPOFOL 100 MG: 10 INJECTION, EMULSION INTRAVENOUS at 14:56

## 2020-01-27 RX ADMIN — PROPOFOL 50 MG: 10 INJECTION, EMULSION INTRAVENOUS at 15:06

## 2020-01-27 RX ADMIN — PROPOFOL 100 MG: 10 INJECTION, EMULSION INTRAVENOUS at 14:51

## 2020-01-27 RX ADMIN — LIDOCAINE HYDROCHLORIDE 100 MG: 20 INJECTION, SOLUTION EPIDURAL; INFILTRATION; INTRACAUDAL; PERINEURAL at 14:47

## 2020-01-27 RX ADMIN — PROPOFOL 50 MG: 10 INJECTION, EMULSION INTRAVENOUS at 15:02

## 2020-01-27 RX ADMIN — FAMOTIDINE 20 MG: 10 INJECTION, SOLUTION INTRAVENOUS at 13:58

## 2020-01-27 RX ADMIN — DEXMEDETOMIDINE 5 MCG: 100 INJECTION, SOLUTION, CONCENTRATE INTRAVENOUS at 14:47

## 2020-01-27 RX ADMIN — SODIUM CHLORIDE, SODIUM LACTATE, POTASSIUM CHLORIDE, AND CALCIUM CHLORIDE 75 ML/HR: 600; 310; 30; 20 INJECTION, SOLUTION INTRAVENOUS at 13:58

## 2020-01-27 RX ADMIN — PROPOFOL 50 MG: 10 INJECTION, EMULSION INTRAVENOUS at 15:11

## 2020-01-27 NOTE — ANESTHESIA PREPROCEDURE EVALUATION
Relevant Problems   No relevant active problems       Anesthetic History   No history of anesthetic complications            Review of Systems / Medical History  Patient summary reviewed and pertinent labs reviewed    Pulmonary  Within defined limits                 Neuro/Psych         TIA and psychiatric history (Depression)     Cardiovascular    Hypertension: well controlled              Exercise tolerance: >4 METS     GI/Hepatic/Renal  Within defined limits              Endo/Other  Within defined limits           Other Findings              Physical Exam    Airway  Mallampati: II  TM Distance: 4 - 6 cm  Neck ROM: normal range of motion   Mouth opening: Normal     Cardiovascular  Regular rate and rhythm,  S1 and S2 normal,  no murmur, click, rub, or gallop             Dental  No notable dental hx       Pulmonary  Breath sounds clear to auscultation               Abdominal  GI exam deferred       Other Findings            Anesthetic Plan    ASA: 3  Anesthesia type: MAC          Induction: Intravenous  Anesthetic plan and risks discussed with: Patient

## 2020-01-27 NOTE — DISCHARGE INSTRUCTIONS
Patient Education        Colonoscopy: What to Expect at Home  Your Recovery  After you have a colonoscopy, you will stay at the clinic for 1 to 2 hours until the medicines wear off. Then you can go home. But you will need to arrange for a ride. Your doctor will tell you when you can eat and do your other usual activities. Your doctor will talk to you about when you will need your next colonoscopy. Your doctor can help you decide how often you need to be checked. This will depend on the results of your test and your risk for colorectal cancer. After the test, you may be bloated or have gas pains. You may need to pass gas. If a biopsy was done or a polyp was removed, you may have streaks of blood in your stool (feces) for a few days. Problems such as heavy rectal bleeding may not occur until several weeks after the test. This isn't common. But it can happen after polyps are removed. This care sheet gives you a general idea about how long it will take for you to recover. But each person recovers at a different pace. Follow the steps below to get better as quickly as possible. How can you care for yourself at home? Activity    · Rest when you feel tired.     · You can do your normal activities when it feels okay to do so. Diet    · Follow your doctor's directions for eating.     · Unless your doctor has told you not to, drink plenty of fluids. This helps to replace the fluids that were lost during the colon prep.     · Do not drink alcohol. Medicines    · Your doctor will tell you if and when you can restart your medicines. He or she will also give you instructions about taking any new medicines.     · If you take blood thinners, such as warfarin (Coumadin), clopidogrel (Plavix), or aspirin, be sure to talk to your doctor. He or she will tell you if and when to start taking those medicines again.  Make sure that you understand exactly what your doctor wants you to do.     · If polyps were removed or a biopsy was done during the test, your doctor may tell you not to take aspirin or other anti-inflammatory medicines for a few days. These include ibuprofen (Advil, Motrin) and naproxen (Aleve). Other instructions    · For your safety, do not drive or operate machinery until the medicine wears off and you can think clearly. Your doctor may tell you not to drive or operate machinery until the day after your test.     · Do not sign legal documents or make major decisions until the medicine wears off and you can think clearly. The anesthesia can make it hard for you to fully understand what you are agreeing to. Follow-up care is a key part of your treatment and safety. Be sure to make and go to all appointments, and call your doctor if you are having problems. It's also a good idea to know your test results and keep a list of the medicines you take. When should you call for help? Call 911 anytime you think you may need emergency care. For example, call if:    · You passed out (lost consciousness).     · You pass maroon or bloody stools.     · You have trouble breathing.    Call your doctor now or seek immediate medical care if:    · You have pain that does not get better after you take pain medicine.     · You are sick to your stomach or cannot drink fluids.     · You have new or worse belly pain.     · You have blood in your stools.     · You have a fever.     · You cannot pass stools or gas.    Watch closely for changes in your health, and be sure to contact your doctor if you have any problems. Where can you learn more? Go to http://bri-khari.info/. Enter E264 in the search box to learn more about \"Colonoscopy: What to Expect at Home. \"  Current as of: December 19, 2018  Content Version: 12.2  © 7734-6502 [employer]. Care instructions adapted under license by [employer] (which disclaims liability or warranty for this information).  If you have questions about a medical condition or this instruction, always ask your healthcare professional. [place] [address] any warranty or liability for your use of this information. DISCHARGE SUMMARY from Nurse    PATIENT INSTRUCTIONS:    After general anesthesia or intravenous sedation, for 24 hours or while taking prescription Narcotics:  · Limit your activities  · Do not drive and operate hazardous machinery  · Do not make important personal or business decisions  · Do  not drink alcoholic beverages  · If you have not urinated within 8 hours after discharge, please contact your surgeon on call. Report the following to your surgeon:  · Excessive pain, swelling, redness or odor of or around the surgical area  · Temperature over 100.5  · Nausea and vomiting lasting longer than 4 hours or if unable to take medications  · Any signs of decreased circulation or nerve impairment to extremity: change in color, persistent  numbness, tingling, coldness or increase pain  · Any questions    What to do at Home:  Recommended activity: {discharge activity:93812}, ***    If you experience any of the following symptoms ***, please follow up with ***. *  Please give a list of your current medications to your Primary Care Provider. *  Please update this list whenever your medications are discontinued, doses are      changed, or new medications (including over-the-counter products) are added. *  Please carry medication information at all times in case of emergency situations. These are general instructions for a healthy lifestyle:    No smoking/ No tobacco products/ Avoid exposure to second hand smoke  Surgeon General's Warning:  Quitting smoking now greatly reduces serious risk to your health.     Obesity, smoking, and sedentary lifestyle greatly increases your risk for illness    A healthy diet, regular physical exercise & weight monitoring are important for maintaining a healthy lifestyle    You may be retaining fluid if you have a history of heart failure or if you experience any of the following symptoms:  Weight gain of 3 pounds or more overnight or 5 pounds in a week, increased swelling in our hands or feet or shortness of breath while lying flat in bed. Please call your doctor as soon as you notice any of these symptoms; do not wait until your next office visit. The discharge information has been reviewed with the {PATIENT PARENT GUARDIAN:10552}. The {PATIENT PARENT GUARDIAN:67100} verbalized understanding. Discharge medications reviewed with the Patient Education        Colon Polyps: Care Instructions  Your Care Instructions    Colon polyps are growths in the colon or the rectum. The cause of most colon polyps is not known, and most people who get them do not have any problems. But a certain kind can turn into cancer. For this reason, regular testing for colon polyps is important for people as they get older. It is also important for anyone who has an increased risk for colon cancer. Polyps are usually found through routine colon cancer screening tests. Although most colon polyps are not cancerous, they are usually removed and then tested for cancer. Screening for colon cancer saves lives because the cancer can usually be cured if it is caught early. If you have a polyp that is the type that can turn into cancer, you may need more tests to examine your entire colon. The doctor will remove any other polyps that he or she finds, and you will be tested more often. Follow-up care is a key part of your treatment and safety. Be sure to make and go to all appointments, and call your doctor if you are having problems. It's also a good idea to know your test results and keep a list of the medicines you take. How can you care for yourself at home? Regular exams to look for colon polyps are the best way to prevent polyps from turning into colon cancer.  These can include stool tests, sigmoidoscopy, colonoscopy, and CT colonography. Talk with your doctor about a testing schedule that is right for you. To prevent polyps  There is no home treatment that can prevent colon polyps. But these steps may help lower your risk for cancer. · Stay active. Being active can help you get to and stay at a healthy weight. Try to exercise on most days of the week. Walking is a good choice. · Eat well. Choose a variety of vegetables, fruits, legumes (such as peas and beans), fish, poultry, and whole grains. · Do not smoke. If you need help quitting, talk to your doctor about stop-smoking programs and medicines. These can increase your chances of quitting for good. · If you drink alcohol, limit how much you drink. Limit alcohol to 2 drinks a day for men and 1 drink a day for women. When should you call for help? Call your doctor now or seek immediate medical care if:    · You have severe belly pain.     · Your stools are maroon or very bloody.    Watch closely for changes in your health, and be sure to contact your doctor if:    · You have a fever.     · You have nausea or vomiting.     · You have a change in bowel habits (new constipation or diarrhea).     · Your symptoms get worse or are not improving as expected. Where can you learn more? Go to http://bri-khari.info/. Enter 95 [id number] in the search box to learn more about \"Colon Polyps: Care Instructions. \"  Current as of: December 19, 2018  Content Version: 12.2  © 8454-9246 [employer]. Care instructions adapted under license by [employer] (which disclaims liability or warranty for this information). If you have questions about a medical condition or this instruction, always ask your healthcare professional. [place] [address] any warranty or liability for your use of this information.         and appropriate educational materials and side effects teaching were provided.   ___________________________________________________________________________________________________________________________________

## 2020-01-27 NOTE — ANESTHESIA POSTPROCEDURE EVALUATION
Procedure(s):  UPPER ENDOSCOPY with gastric and esophageal bx's  COLONOSCOPY with polypectomy.     MAC    Anesthesia Post Evaluation      Multimodal analgesia: multimodal analgesia used between 6 hours prior to anesthesia start to PACU discharge  Patient location during evaluation: bedside  Patient participation: complete - patient participated  Level of consciousness: awake  Pain management: adequate  Airway patency: patent  Anesthetic complications: no  Cardiovascular status: stable  Respiratory status: acceptable  Hydration status: acceptable  Post anesthesia nausea and vomiting:  controlled      Vitals Value Taken Time   /47 1/27/2020  3:45 PM   Temp 36.5 °C (97.7 °F) 1/27/2020  3:25 PM   Pulse 73 1/27/2020  3:46 PM   Resp 15 1/27/2020  3:46 PM   SpO2 95 % 1/27/2020  3:46 PM

## 2020-01-27 NOTE — H&P
[email or web address]  [phone]    GASTROENTEROLOGY Pre-Procedure H and P      Impression/Plan:   1. This patient is consented for an EGD and colonoscopy for nausea, dysphagia, weight loss, loss of appetite, hemoccult positive. Chief Complaint: nausea, dysphagia, weight loss, loss of appetite, hemoccult positive. HPI:  Meghan Allen is a 78 y.o. female who presents for an EGD and colonoscopy for evaluation of nausea, dysphagia, weight loss, loss of appetite, hemoccult positive. PMH:   Past Medical History:   Diagnosis Date    Cystitis     Depression     High cholesterol     Hypertension     Near syncope 01/13/2020    Scalp contusion 01/17/2020       PSH:   History reviewed. No pertinent surgical history.     Social HX:   Social History     Socioeconomic History    Marital status: SINGLE     Spouse name: Not on file    Number of children: Not on file    Years of education: Not on file    Highest education level: Not on file   Occupational History    Not on file   Social Needs    Financial resource strain: Not on file    Food insecurity:     Worry: Not on file     Inability: Not on file    Transportation needs:     Medical: Not on file     Non-medical: Not on file   Tobacco Use    Smoking status: Never Smoker    Smokeless tobacco: Never Used   Substance and Sexual Activity    Alcohol use: Not Currently    Drug use: Never    Sexual activity: Not Currently   Lifestyle    Physical activity:     Days per week: Not on file     Minutes per session: Not on file    Stress: Not on file   Relationships    Social connections:     Talks on phone: Not on file     Gets together: Not on file     Attends [employer] service: Not on file     Active member of club or organization: Not on file     Attends meetings of clubs or organizations: Not on file     Relationship status: Not on file    Intimate partner violence:     Fear of current or ex partner: Not on file     Emotionally abused: Not on file Physically abused: Not on file     Forced sexual activity: Not on file   Other Topics Concern    Not on file   Social History Narrative    Not on file       FHX:   History reviewed. No pertinent family history. Allergy:   Allergies   Allergen Reactions    Aleve [Naproxen Sodium] Unable to Obtain    Bactrim [Sulfamethoprim] Diarrhea    Diphenhydramine Drowsiness     Patient states \"It makes me sleepy\"    Feldene [Piroxicam] Unknown (comments)     Shaking      Penicillins Rash       Home Medications:     Medications Prior to Admission   Medication Sig    sertraline (ZOLOFT) 100 mg tablet Take 100 mg by mouth daily.  diclofenac (VOLTAREN) 1 % gel Apply 4 g to affected area four (4) times daily.  amLODIPine (NORVASC) 10 mg tablet Take 10 mg by mouth daily.  aspirin 81 mg chewable tablet Take 81 mg by mouth daily.  LORazepam (ATIVAN) 0.5 mg tablet Take 1 Tab by mouth daily as needed for Anxiety. Review of Systems:     A complete 10 point review of systems was performed and pertinents are as per the HPI. Remainder of the review of systems was negative. Visit Vitals  /73   Pulse (!) 102   Temp 98.4 °F (36.9 °C)   Resp 18   Ht 5' 4\" (1.626 m)   Wt 65 kg (143 lb 6.4 oz)   SpO2 98%   BMI 24.61 kg/m²       Physical Assessment:     General: alert, cooperative, no acute distress, appears stated age. HEENT: normocephalic, no scleral icterus, moist mucous membranes, EOMs intact, no neck masses noted. Respiratory: lungs clear to auscultation bilaterally. Cardiovascular: regular rate and rhythm, no murmurs, rubs or gallops. Abdomen: normal bowel sounds, soft, non-tender to palpation. Extremities: no lower extremity edema, no cyanosis or clubbing. Neuro: alert and oriented x 3; non-focal exam.  Skin: no rashes. Psych: normal mood and affect. Akin Miranda MD  [facility]  [email or web address]  Phone: [phone]  Pager: [phone]  [email or web address]

## 2020-01-27 NOTE — PROCEDURES
[email or web address] [address]  [address] [facility], [address]      Brief Procedure Note    María Jackson  [birth year]  [id number]    Date of Procedure: 1/27/2020    Preoperative diagnosis: Nausea:  787.02 - R11.0  Dysphagia:  787.20 - R13.10  Loss of appetite:  783.0 - R63.0  Weight loss:  783.21 - R63.4  Occult blood in stools:  792.1 - R19.5    Postoperative diagnosis: EGD: irregular z-line. [place]: AO polyp, internal hemorrhoids.     Type of Anesthesia: MAC (Monitored anesthesia care)    Description of findings: same as post op dx    Procedure: Procedure(s):  UPPER ENDOSCOPY with gastric and esophageal bx's  COLONOSCOPY with polypectomy    :  Dr. Annetta Patel MD    Assistant(s): Endoscopy Technician-1: Pedro Butcher  Endoscopy RN-1: Ulises Garcia RN  Endoscopy RN-2: Sunny Conner RN  Float Staff: [place]    EBL:None    Specimens:   ID Type Source Tests Collected by Time Destination   1 : Body and antrum biopsy Preservative Gastric  Jose Luis Blair MD 1/27/2020 1456 Pathology   2 : GE junction bx's Preservative GE Imani Devi MD 1/27/2020 1500 Pathology   3 : Appendaceal orifice polyp Preservative Appendiceal Nikko Esqueda MD 1/27/2020 1511 Pathology       Findings: See printed and scanned procedure note    Complications: None    Dr. Annetta Patel MD  1/27/2020  3:33 PM

## 2020-02-04 ENCOUNTER — HOSPITAL ENCOUNTER (OUTPATIENT)
Dept: PHYSICAL THERAPY | Age: 70
Discharge: HOME | End: 2020-02-04
Payer: MEDICARE

## 2020-02-04 PROCEDURE — 97164 PT RE-EVAL EST PLAN CARE: CPT

## 2020-02-04 PROCEDURE — 97110 THERAPEUTIC EXERCISES: CPT

## 2020-02-05 NOTE — PROGRESS NOTES
[facility]  [place]  [address] [facility]  [phone] fax    Continued Plan of Care/ Re-certification for Physical Therapy Services    Patient name: Rick Rodriguez Start of Care: 19   Referral source: Talon Woods MD : 1950   Medical/Treatment Diagnosis: Pain in left knee [M25.562]  Unilateral primary osteoarthritis, left knee [M17.12]  Payor: BLUE CROSS MEDICARE / Plan: [address] [place] [address] [place] HMO / Product Type: Managed Care Medicare /  Onset Date:2019     Prior Hospitalization: see medical history Provider#: [id number]   Medications: Verified on Patient Summary List    Comorbidities: depression, OA, HTN   Prior Level of Function: I ambulation     Visits from Start of Care: 16    Missed Visits: 5    The Plan of Care and following information is based on the patient's current status:  Goal: Pt will demonstrate an increased FOTO score to 54/100 in order to improve function. Status at last note/certification: Not met. Declined 1 point since last assessed. Declined 6 points since initial evaluation   Current Status: not met    Goal: Pt will demonstrate understanding/compliance with final HEP in order to allow for continued progression independently following DC   Status at last note/certification: Progressing. Pt reports intermittent compliance with HEP  Current Status: not met    Goal:Pt will be able to ambulate 250ft continuously without AD/LE buckling/LOB/rest break in order to return to community ambulation and her daily walking regimen. Status at last note/certification: Goal met. Pt reports ambulatory tolerance is 20 minutes   Current Status: met    Goal: Pt will improve left knee flexion strength to 4/5 in order to improve ease/ability with prolonged ambulation and stairs.   Status at last note/certification: Not met.  4-/5 (4-/5 at last progress note)  Current Status: not met    Key functional changes: improving ambulatory tolerance       Problems/ barriers to goal attainment: inconsistent therapy attendance throughout duration of care, inconsistent HEP compliance     Problem List: pain affecting function, decrease ROM, decrease strength, impaired gait/ balance, decrease ADL/ functional abilitiies, decrease activity tolerance and decrease flexibility/ joint mobility    Treatment Plan: Therapeutic exercise, Therapeutic activities, Neuromuscular re-education, Physical agent/modality, Gait/balance training, Manual therapy, Patient education, Self Care training, Functional mobility training, Home safety training and Stair training     Patient Goal (s) has been updated and includes: less pain     Goals for this certification period to be accomplished in 1 treatments:  1. Pt will demonstrate understanding/compliance with final HEP in order to allow for continued progression independently following DC. Frequency / Duration: Patient to be seen 1 times per week for 1 treatment:    Assessment / Recommendations: Pt is making slow progress in therapy, meeting 1/4 updated goals. She continues to demonstrate knee strength deficits, likely with intermittent compliance with HEP contributing to lack of further strength progression. FOTO score has declined; however, pt was very frustrated with conversation regarding DC from therapy, which may have affected FOTO score. Ambulatory tolerance has significantly improved since start of care, and pt now reports ambulatory tolerance of 20 minutes. Pt will benefit from continued skilled PT for 1 final session to ensure understanding of final HEP in order to allow for continued progression independently following DC. DC after next session d/t plateau in progress.      Certification Period: 2/3/20-3/3/20     Don Araujo, PT 2/4/2020 10:52 PM    ________________________________________________________________________  I certify that the above Therapy Services are being furnished while the patient is under my care. I agree with the treatment plan and certify that this therapy is necessary. [] I have read the above and request that my patient continue as recommended.   [] I have read the above report and request that my patient continue therapy with the following changes/special instructions: _______________________________________  [] I have read the above report and request that my patient be discharged from therapy    Physician's Signature:____________Date:_________TIME:________    ** Signature, Date and Time must be completed for valid certification **    Please sign and return to [facility]  [facility]  [address] [place] [address]  [phone] fax

## 2020-02-05 NOTE — PROGRESS NOTES
PT DISCHARGE DAILY NOTE AND [id number]    Patient name: Daniel Morales Start of Care: 19   Referral source: Dipika Pace MD : 1950   Medical/Treatment Diagnosis: Pain in left knee [M25.562]  Unilateral primary osteoarthritis, left knee [M17.12] Onset Date:2019     Prior Hospitalization: see medical history Provider#: [id number]   Medications: Verified on Patient Summary List    Comorbidities: depression, OA, HTN   Prior Level of Function: I ambulation      Visits from Start of Care: 16    Missed Visits: 5    Reporting Period : 20 to 20     Date:2020  : 1950  [x]  Patient  Verified  Payor: BLUE CROSS MEDICARE / Plan: [place] H[employer] / Product Type: Managed Care Medicare /    In time:2:28  Out time: 3:25  Total Treatment Time (min): 62  Visit #: 1 of 1    Medicare/BCBS Only   Total Timed Codes (min):  42 1:1 Treatment Time:  42       SUBJECTIVE  Pain Level (0-10 scale): 3/10 left knee, 5/10 right knee   Any medication changes, allergies to medications, adverse drug reactions, diagnosis change, or new procedure performed?: [x] No    [] Yes (see summary sheet for update)  Subjective functional status/changes:   [] No changes reported  Pt reports 50% overall improvement since start of care. Her greatest remaining complaint is continued pain with prolonged ambulation. Ambulatory tolerance is currently 20 minutes. She is more limited by her LBP than her knee pain.   She is still tired from having her colonoscopy last week.         OBJECTIVE    Modality rationale: decrease pain and increase tissue extensibility to improve the patients ability to tolerate ambulation and daily tasks    Min Type Additional Details    [] Estim:  []Unatt       []IFC  []Premod                        []Other:  []w/ice   []w/heat  Position:  Location:    [] Estim: []Att    []TENS instruct  []NMES                    []Other:  []w/US   []w/ice   []w/heat  Position:  Location:    [] Traction: [] Cervical       []Lumbar                       [] Prone          []Supine                       []Intermittent   []Continuous Lbs:  [] before manual  [] after manual    []  Ultrasound: []Continuous   [] Pulsed                           []1MHz   []3MHz W/cm2:  Location:    []  Iontophoresis with dexamethasone         Location: [] Take home patch   [] In clinic   15 []  Ice     [x]  heat  []  Ice massage  []  Laser   []  Anodyne Position: supine with HOB elevated and wedge under BLE  Location: B Knees     []  Laser with stim  []  Other:  Position:  Location:    []  Vasopneumatic Device Pressure:       [] lo [] med [] hi   Temperature: [] lo [] med [] hi   [x] Skin assessment post-treatment:  [x]intact []redness- no adverse reaction    []redness  adverse reaction:     12 min []Eval                  [x]Re-Eval       30 min Therapeutic Exercise:  [x] See flow sheet :   Rationale: increase ROM and increase strength to improve the patients ability to allow for continued progression independently following DC           With   [] TE   [] TA   [] neuro   [] other: Patient Education: [x] Review HEP    [] Progressed/Changed HEP based on:   [] positioning   [] body mechanics   [] transfers   [] heat/ice application    [] other:      Other Objective/Functional Measures:     Reviewed final HEP  Challenged with SLR with ER B  No increased pain with therex    MMT B Knees  Extension: 4+/5 B  Flexion: Right 4/5, Left 4-/5    FOTO 37    Pt was not pleased to be DC from therapy, reporting \"Well I'll do the exercises if I come here, and it helps me to have something to look forward to\"   Educated pt extensively how skilled PT was no longer necessary for her knees as she could strengthen independently with HEP  Gave pt information regarding the [place]movin and the [place] to assist with pt's self-progression with strengthening as well as socialization opportunities     Discussed plan for therapist to follow up with MD regarding pt's LBP limiting function and ask about MD's recommendation regarding therapy for LBP      Pain Level (0-10 scale) post treatment: 4/10 right knee, 3/10 left knee     Summary of Care:  Goal: Pt will demonstrate understanding/compliance with final HEP in order to allow for continued progression independently following DC   Status at last note/certification: Not met. Pt reports intermittent compliance   Status at discharge: not met      ASSESSMENT/Changes in Function:     Pt has made slow progress in therapy. Further progress has been limited by inconsistent therapy attendance and intermittent compliance with HEP, with decreased motivation d/t depression likely contributing per subjective reports. Updated HEP was reviewed this visit to allow for continued strength progression independently following DC. DC at this time d/t plateau in progress. Pt complains of low back pain that is limiting function more than knee pain. She may benefit from physical therapy referral to address LBP.      Thank you for this referral!      PLAN  [x]Discontinue therapy: []Patient has reached or is progressing toward set goals      []Patient is non-compliant or has abdicated      [x]Due to lack of appreciable progress towards set goals    Dionicio Meeks, PT 2/4/2020  10:44 PM

## 2020-02-06 ENCOUNTER — APPOINTMENT (OUTPATIENT)
Dept: PHYSICAL THERAPY | Age: 70
End: 2020-02-06
Payer: MEDICARE

## 2020-02-07 ENCOUNTER — TELEPHONE (OUTPATIENT)
Dept: PHYSICAL THERAPY | Age: 70
End: 2020-02-07

## 2020-02-11 ENCOUNTER — APPOINTMENT (OUTPATIENT)
Dept: PHYSICAL THERAPY | Age: 70
End: 2020-02-11
Payer: MEDICARE

## 2020-02-13 ENCOUNTER — APPOINTMENT (OUTPATIENT)
Dept: PHYSICAL THERAPY | Age: 70
End: 2020-02-13
Payer: MEDICARE

## 2020-02-18 ENCOUNTER — APPOINTMENT (OUTPATIENT)
Dept: PHYSICAL THERAPY | Age: 70
End: 2020-02-18
Payer: MEDICARE

## 2020-02-20 ENCOUNTER — APPOINTMENT (OUTPATIENT)
Dept: PHYSICAL THERAPY | Age: 70
End: 2020-02-20
Payer: MEDICARE

## 2020-02-22 ENCOUNTER — HOSPITAL ENCOUNTER (EMERGENCY)
Age: 70
Discharge: HOME | End: 2020-02-22
Attending: EMERGENCY MEDICINE
Payer: MEDICARE

## 2020-02-22 VITALS
TEMPERATURE: 98.2 F | SYSTOLIC BLOOD PRESSURE: 120 MMHG | DIASTOLIC BLOOD PRESSURE: 69 MMHG | RESPIRATION RATE: 14 BRPM | HEART RATE: 88 BPM | BODY MASS INDEX: 24.41 KG/M2 | HEIGHT: 64 IN | OXYGEN SATURATION: 98 % | WEIGHT: 143 LBS

## 2020-02-22 DIAGNOSIS — N30.00 ACUTE CYSTITIS WITHOUT HEMATURIA: Primary | ICD-10-CM

## 2020-02-22 DIAGNOSIS — R53.83 FATIGUE, UNSPECIFIED TYPE: ICD-10-CM

## 2020-02-22 LAB
ALBUMIN SERPL-MCNC: 3.3 G/DL (ref 3.4–5)
ALBUMIN/GLOB SERPL: 0.7 {RATIO} (ref 0.8–1.7)
ALP SERPL-CCNC: 112 U/L (ref 45–117)
ALT SERPL-CCNC: 60 U/L (ref 13–56)
ANION GAP SERPL CALC-SCNC: 9 MMOL/L (ref 3–18)
APPEARANCE UR: CLEAR
AST SERPL-CCNC: 42 U/L (ref 10–38)
BACTERIA URNS QL MICRO: ABNORMAL /HPF
BASOPHILS # BLD: 0 K/UL (ref 0–0.1)
BASOPHILS NFR BLD: 1 % (ref 0–2)
BILIRUB SERPL-MCNC: 0.2 MG/DL (ref 0.2–1)
BILIRUB UR QL: NEGATIVE
BUN SERPL-MCNC: 14 MG/DL (ref 7–18)
BUN/CREAT SERPL: 25 (ref 12–20)
CALCIUM SERPL-MCNC: 9.6 MG/DL (ref 8.5–10.1)
CHLORIDE SERPL-SCNC: 106 MMOL/L (ref 100–111)
CO2 SERPL-SCNC: 29 MMOL/L (ref 21–32)
COLOR UR: YELLOW
CREAT SERPL-MCNC: 0.57 MG/DL (ref 0.6–1.3)
DIFFERENTIAL METHOD BLD: ABNORMAL
EOSINOPHIL # BLD: 0.1 K/UL (ref 0–0.4)
EOSINOPHIL NFR BLD: 1 % (ref 0–5)
EPITH CASTS URNS QL MICRO: ABNORMAL /LPF (ref 0–5)
ERYTHROCYTE [DISTWIDTH] IN BLOOD BY AUTOMATED COUNT: 14.9 % (ref 11.6–14.5)
GLOBULIN SER CALC-MCNC: 4.8 G/DL (ref 2–4)
GLUCOSE SERPL-MCNC: 132 MG/DL (ref 74–99)
GLUCOSE UR STRIP.AUTO-MCNC: NEGATIVE MG/DL
HCT VFR BLD AUTO: 37.7 % (ref 35–45)
HGB BLD-MCNC: 12.6 G/DL (ref 12–16)
HGB UR QL STRIP: NEGATIVE
KETONES UR QL STRIP.AUTO: NEGATIVE MG/DL
LEUKOCYTE ESTERASE UR QL STRIP.AUTO: ABNORMAL
LYMPHOCYTES # BLD: 1.8 K/UL (ref 0.9–3.6)
LYMPHOCYTES NFR BLD: 25 % (ref 21–52)
MAGNESIUM SERPL-MCNC: 2.1 MG/DL (ref 1.6–2.6)
MCH RBC QN AUTO: 29.4 PG (ref 24–34)
MCHC RBC AUTO-ENTMCNC: 33.4 G/DL (ref 31–37)
MCV RBC AUTO: 88.1 FL (ref 74–97)
MONOCYTES # BLD: 0.7 K/UL (ref 0.05–1.2)
MONOCYTES NFR BLD: 9 % (ref 3–10)
MUCOUS THREADS URNS QL MICRO: ABNORMAL /LPF
NEUTS SEG # BLD: 4.6 K/UL (ref 1.8–8)
NEUTS SEG NFR BLD: 64 % (ref 40–73)
NITRITE UR QL STRIP.AUTO: NEGATIVE
PH UR STRIP: 6.5 [PH] (ref 5–8)
PLATELET # BLD AUTO: 185 K/UL (ref 135–420)
PMV BLD AUTO: 11.6 FL (ref 9.2–11.8)
POTASSIUM SERPL-SCNC: 3.9 MMOL/L (ref 3.5–5.5)
PROT SERPL-MCNC: 8.1 G/DL (ref 6.4–8.2)
PROT UR STRIP-MCNC: NEGATIVE MG/DL
RBC # BLD AUTO: 4.28 M/UL (ref 4.2–5.3)
RBC #/AREA URNS HPF: ABNORMAL /HPF (ref 0–5)
SODIUM SERPL-SCNC: 144 MMOL/L (ref 136–145)
SP GR UR REFRACTOMETRY: 1.02 (ref 1–1.03)
UROBILINOGEN UR QL STRIP.AUTO: 0.2 EU/DL (ref 0.2–1)
WBC # BLD AUTO: 7.2 K/UL (ref 4.6–13.2)
WBC URNS QL MICRO: ABNORMAL /HPF (ref 0–4)

## 2020-02-22 PROCEDURE — 99283 EMERGENCY DEPT VISIT LOW MDM: CPT

## 2020-02-22 PROCEDURE — 85025 COMPLETE CBC W/AUTO DIFF WBC: CPT

## 2020-02-22 PROCEDURE — 80053 COMPREHEN METABOLIC PANEL: CPT

## 2020-02-22 PROCEDURE — 74011250636 HC RX REV CODE- 250/636: Performed by: PHYSICIAN ASSISTANT

## 2020-02-22 PROCEDURE — 87086 URINE CULTURE/COLONY COUNT: CPT

## 2020-02-22 PROCEDURE — 81001 URINALYSIS AUTO W/SCOPE: CPT

## 2020-02-22 PROCEDURE — 74011250637 HC RX REV CODE- 250/637: Performed by: PHYSICIAN ASSISTANT

## 2020-02-22 PROCEDURE — 83735 ASSAY OF MAGNESIUM: CPT

## 2020-02-22 RX ORDER — ARIPIPRAZOLE 5 MG/1
5 TABLET ORAL DAILY
COMMUNITY
End: 2022-04-11

## 2020-02-22 RX ORDER — ERGOCALCIFEROL 1.25 MG/1
CAPSULE ORAL
COMMUNITY
Start: 2019-12-17 | End: 2020-02-22

## 2020-02-22 RX ORDER — CIPROFLOXACIN 500 MG/1
500 TABLET ORAL
Status: COMPLETED | OUTPATIENT
Start: 2020-02-22 | End: 2020-02-22

## 2020-02-22 RX ORDER — CIPROFLOXACIN 500 MG/1
500 TABLET ORAL 2 TIMES DAILY
Qty: 14 TAB | Refills: 0 | Status: SHIPPED | OUTPATIENT
Start: 2020-02-22 | End: 2020-02-29

## 2020-02-22 RX ORDER — CHOLECALCIFEROL (VITAMIN D3) 125 MCG
CAPSULE ORAL
COMMUNITY
Start: 2020-02-06 | End: 2020-08-04

## 2020-02-22 RX ADMIN — SODIUM CHLORIDE 1000 ML: 900 INJECTION, SOLUTION INTRAVENOUS at 11:56

## 2020-02-22 RX ADMIN — CIPROFLOXACIN HYDROCHLORIDE 500 MG: 500 TABLET, FILM COATED ORAL at 13:17

## 2020-02-22 NOTE — DISCHARGE INSTRUCTIONS
Patient Education        Fatigue: Care Instructions  Your Care Instructions    Fatigue is a feeling of tiredness, exhaustion, or lack of energy. You may feel fatigue because of too much or not enough activity. It can also come from stress, lack of sleep, boredom, and poor diet. Many medical problems, such as viral infections, can cause fatigue. Emotional problems, especially depression, are often the cause of fatigue. Fatigue is most often a symptom of another problem. Treatment for fatigue depends on the cause. For example, if you have fatigue because you have a certain health problem, treating this problem also treats your fatigue. If depression or anxiety is the cause, treatment may help. Follow-up care is a key part of your treatment and safety. Be sure to make and go to all appointments, and call your doctor if you are having problems. It's also a good idea to know your test results and keep a list of the medicines you take. How can you care for yourself at home? · Get regular exercise. But don't overdo it. Go back and forth between rest and exercise. · Get plenty of rest.  · Eat a healthy diet. Do not skip meals, especially breakfast.  · Reduce your use of caffeine, tobacco, and alcohol. Caffeine is most often found in coffee, tea, cola drinks, and chocolate. · Limit medicines that can cause fatigue. This includes tranquilizers and cold and allergy medicines. When should you call for help? Watch closely for changes in your health, and be sure to contact your doctor if:    · You have new symptoms such as fever or a rash.     · Your fatigue gets worse.     · You have been feeling down, depressed, or hopeless. Or you may have lost interest in things that you usually enjoy.     · You are not getting better as expected. Where can you learn more? Go to http://bri-khari.info/. Enter B719 in the search box to learn more about \"Fatigue: Care Instructions. \"  Current as of: June 26, 2019  Content Version: 12.2  © 9449-5321 [employer]. Care instructions adapted under license by [employer] (which disclaims liability or warranty for this information). If you have questions about a medical condition or this instruction, always ask your healthcare professional. [address] any warranty or liability for your use of this information. Patient Education        Urinary Tract Infection in Women: Care Instructions  Your Care Instructions    A urinary tract infection, or UTI, is a general term for an infection anywhere between the kidneys and the urethra (where urine comes out). Most UTIs are bladder infections. They often cause pain or burning when you urinate. UTIs are caused by bacteria and can be cured with antibiotics. Be sure to complete your treatment so that the infection goes away. Follow-up care is a key part of your treatment and safety. Be sure to make and go to all appointments, and call your doctor if you are having problems. It's also a good idea to know your test results and keep a list of the medicines you take. How can you care for yourself at home? · Take your antibiotics as directed. Do not stop taking them just because you feel better. You need to take the full course of antibiotics. · Drink extra water and other fluids for the next day or two. This may help wash out the bacteria that are causing the infection. (If you have kidney, heart, or liver disease and have to limit fluids, talk with your doctor before you increase your fluid intake.)  · Avoid drinks that are carbonated or have caffeine. They can irritate the bladder. · Urinate often. Try to empty your bladder each time. · To relieve pain, take a hot bath or lay a heating pad set on low over your lower belly or genital area. Never go to sleep with a heating pad in place. To prevent UTIs  · Drink plenty of water each day.  This helps you urinate often, which clears bacteria from your system. (If you have kidney, heart, or liver disease and have to limit fluids, talk with your doctor before you increase your fluid intake.)  · Urinate when you need to. · Urinate right after you have sex. · Change sanitary pads often. · Avoid douches, bubble baths, feminine hygiene sprays, and other feminine hygiene products that have deodorants. · After going to the bathroom, wipe from front to back. When should you call for help? Call your doctor now or seek immediate medical care if:    · Symptoms such as fever, chills, nausea, or vomiting get worse or appear for the first time.     · You have new pain in your back just below your rib cage. This is called flank pain.     · There is new blood or pus in your urine.     · You have any problems with your antibiotic medicine.    Watch closely for changes in your health, and be sure to contact your doctor if:    · You are not getting better after taking an antibiotic for 2 days.     · Your symptoms go away but then come back. Where can you learn more? Go to http://bri-khari.info/. Enter Q802 in the search box to learn more about \"Urinary Tract Infection in Women: Care Instructions. \"  Current as of: December 19, 2018  Content Version: 12.2  © 3931-3252 [employer]. Care instructions adapted under license by [employer] (which disclaims liability or warranty for this information). If you have questions about a medical condition or this instruction, always ask your healthcare professional. [place] [address] any warranty or liability for your use of this information. [facility] Activation    Thank you for requesting access to [employer] [facility]. Please follow the instructions below to securely access and download your online medical record. [employer] [facility] allows you to send messages to your doctor, view your test results, renew your prescriptions, schedule appointments, and more.     How Do I Sign Up?    1. In your internet browser, go to [email or web address]  2. Click on the First Time User? Click Here link in the Sign In box. You will be redirect to the New Member Sign Up page. 3. Enter your Noveporter Access Code exactly as it appears below. You will not need to use this code after youve completed the sign-up process. If you do not sign up before the expiration date, you must request a new code. Noveporter Access Code: [email or web address]  Expires: 2020  1:26 PM (This is the date your Noveporter access code will )    4. Enter the last four digits of your Social Security Number (xxxx) and Date of Birth (mm/dd/yyyy) as indicated and click Submit. You will be taken to the next sign-up page. 5. Create a Noveporter ID. This will be your Noveporter login ID and cannot be changed, so think of one that is secure and easy to remember. 6. Create a Noveporter password. You can change your password at any time. 7. Enter your Password Reset Question and Answer. This can be used at a later time if you forget your password. 8. Enter your e-mail address. You will receive e-mail notification when new information is available in [address] 9. Click Sign Up. You can now view and download portions of your medical record. 10. Click the Download Summary menu link to download a portable copy of your medical information. Additional Information    If you have questions, please visit the Frequently Asked Questions section of the Noveporter website at [email or web address] Remember, Noveporter is NOT to be used for urgent needs. For medical emergencies, dial 911. Complete all medications as prescribed. Follow-up with primary care doctor in 1 week. Return to the ED immediately for any new or worsening symptoms.

## 2020-02-22 NOTE — ED TRIAGE NOTES
Pt states that she had a colonoscopy on 01/27/2020 and since then has been weak and is concerned that she may be dehydrated. Pt also expresses concerns for possible UTI.

## 2020-02-22 NOTE — ED PROVIDER NOTES
EMERGENCY DEPARTMENT HISTORY AND PHYSICAL EXAM    Date: 2/22/2020  Patient Name: Osmany Read [facility]    History of Presenting Illness     Chief Complaint   Patient presents with    Fatigue         History Provided By: patient     Chief Complaint: fatigue   Duration: 1 month  Timing: acute  Location: n/a   Quality:n/a  Severity: moderate   Modifying Factors: none  Associated Symptoms: urinary frequency       Additional History (Context): Siva Monsivais is a 78 y.o. female with PMH htn, high cholesterol, and depression, who presents with c/o feeling fatigued and weak since having a colonoscopy on 1/27/2020. Pt concerned she may have a UTI as she had one prior to the colonoscopy and reports continued urinary frequency. Pt reports completing macrobid a few weeks ago. Denies fever, chills, SOB, chest pain, nausea, abd pain, diarrhea, constipation, and dysuria. No other complaints reported. PCP: Ewa Ortega MD    Current Facility-Administered Medications   Medication Dose Route Frequency Provider Last Rate Last Dose    ciprofloxacin HCl (CIPRO) tablet 500 mg  500 mg Oral NOW Raian Shah PA-C         Current Outpatient Medications   Medication Sig Dispense Refill    cholecalciferol (VITAMIN D3) (5000 Units /125 mcg) capsule one pill      ARIPiprazole (ABILIFY) 5 mg tablet Take 5 mg by mouth daily.  ciprofloxacin HCl (CIPRO) 500 mg tablet Take 1 Tab by mouth two (2) times a day for 7 days. 14 Tab 0    sertraline (ZOLOFT) 100 mg tablet Take 100 mg by mouth daily.  amLODIPine (NORVASC) 10 mg tablet Take 10 mg by mouth daily.  aspirin 81 mg chewable tablet Take 81 mg by mouth daily.  diclofenac (VOLTAREN) 1 % gel Apply 4 g to affected area four (4) times daily.  100 g 0       Past History     Past Medical History:  Past Medical History:   Diagnosis Date    Cystitis     Depression     High cholesterol     Hypertension     Near syncope 01/13/2020    Scalp contusion 01/17/2020 Past Surgical History:  Past Surgical History:   Procedure Laterality Date    COLONOSCOPY N/A 1/27/2020    COLONOSCOPY with polypectomy performed by Rosa Isela Lennon MD at [facility]       Family History:  No family history on file. Social History:  Social History     Tobacco Use    Smoking status: Never Smoker    Smokeless tobacco: Never Used   Substance Use Topics    Alcohol use: Not Currently    Drug use: Never       Allergies: Allergies   Allergen Reactions    Aleve [Naproxen Sodium] Unable to Obtain    Bactrim [Sulfamethoprim] Diarrhea    Diphenhydramine Drowsiness     Patient states \"It makes me sleepy\"    Feldene [Piroxicam] Unknown (comments)     Shaking      Penicillins Rash         Review of Systems   Review of Systems   Constitutional: Positive for fatigue. Negative for chills and fever. HENT: Negative. Negative for congestion, ear pain and rhinorrhea. Eyes: Negative. Negative for pain and redness. Respiratory: Negative. Negative for cough, shortness of breath, wheezing and stridor. Cardiovascular: Negative. Negative for chest pain and leg swelling. Gastrointestinal: Negative. Negative for abdominal pain, constipation, diarrhea, nausea and vomiting. Genitourinary: Negative. Negative for dysuria and frequency. Musculoskeletal: Negative. Negative for back pain and neck pain. Skin: Negative. Negative for rash and wound. Neurological: Negative. Negative for dizziness, seizures, syncope and headaches. All other systems reviewed and are negative. All Other Systems Negative  Physical Exam     Vitals:    02/22/20 1052   BP: 120/69   Pulse: 88   Resp: 14   Temp: 98.2 °F (36.8 °C)   SpO2: 98%   Weight: 64.9 kg (143 lb)   Height: 5' 4\" (1.626 m)     Physical Exam  Vitals signs and nursing note reviewed. Constitutional:       General: She is not in acute distress. Appearance: She is well-developed. She is not diaphoretic.    HENT:      Head: Normocephalic and atraumatic. Nose: Nose normal.      Mouth/Throat:      Mouth: Mucous membranes are moist.   Eyes:      General: No scleral icterus. Right eye: No discharge. Left eye: No discharge. Conjunctiva/sclera: Conjunctivae normal.   Neck:      Musculoskeletal: Normal range of motion and neck supple. Cardiovascular:      Rate and Rhythm: Normal rate and regular rhythm. Heart sounds: Normal heart sounds. No murmur. No friction rub. No gallop. Pulmonary:      Effort: Pulmonary effort is normal. No respiratory distress. Breath sounds: Normal breath sounds. No stridor. No wheezing or rales. Abdominal:      General: Bowel sounds are normal. There is no distension. Palpations: Abdomen is soft. There is no mass. Tenderness: There is no abdominal tenderness. There is no guarding. Musculoskeletal: Normal range of motion. Skin:     General: Skin is warm and dry. Findings: No erythema or rash. Neurological:      Mental Status: She is alert and oriented to person, place, and time. Coordination: Coordination normal.      Comments: Gait is steady and patient exhibits no evidence of ataxia. Patient is able to ambulate without difficulty. No focal neurological deficit noted. No facial droop, slurred speech, or evidence of altered mentation noted on exam.     Psychiatric:         Behavior: Behavior normal.         Thought Content:  Thought content normal.                Diagnostic Study Results     Labs -     Recent Results (from the past 12 hour(s))   CBC WITH AUTOMATED DIFF    Collection Time: 02/22/20 11:34 AM   Result Value Ref Range    WBC 7.2 4.6 - 13.2 K/uL    RBC 4.28 4.20 - 5.30 M/uL    HGB 12.6 12.0 - 16.0 g/dL    HCT 37.7 35.0 - 45.0 %    MCV 88.1 74.0 - 97.0 FL    MCH 29.4 24.0 - 34.0 PG    MCHC 33.4 31.0 - 37.0 g/dL    RDW 14.9 (H) 11.6 - 14.5 %    PLATELET 379 767 - 494 K/uL    MPV 11.6 9.2 - 11.8 FL    NEUTROPHILS 64 40 - 73 %    LYMPHOCYTES 25 21 - 52 % MONOCYTES 9 3 - 10 %    EOSINOPHILS 1 0 - 5 %    BASOPHILS 1 0 - 2 %    ABS. NEUTROPHILS 4.6 1.8 - 8.0 K/UL    ABS. LYMPHOCYTES 1.8 0.9 - 3.6 K/UL    ABS. MONOCYTES 0.7 0.05 - 1.2 K/UL    ABS. EOSINOPHILS 0.1 0.0 - 0.4 K/UL    ABS. BASOPHILS 0.0 0.0 - 0.1 K/UL    DF AUTOMATED     METABOLIC PANEL, COMPREHENSIVE    Collection Time: 02/22/20 11:34 AM   Result Value Ref Range    Sodium 144 136 - 145 mmol/L    Potassium 3.9 3.5 - 5.5 mmol/L    Chloride 106 100 - 111 mmol/L    CO2 29 21 - 32 mmol/L    Anion gap 9 3.0 - 18 mmol/L    Glucose 132 (H) 74 - 99 mg/dL    BUN 14 7.0 - 18 MG/DL    Creatinine 0.57 (L) 0.6 - 1.3 MG/DL    BUN/Creatinine ratio 25 (H) 12 - 20      GFR est AA >60 >60 ml/min/1.73m2    GFR est non-AA >60 >60 ml/min/1.73m2    Calcium 9.6 8.5 - 10.1 MG/DL    Bilirubin, total 0.2 0.2 - 1.0 MG/DL    ALT (SGPT) 60 (H) 13 - 56 U/L    AST (SGOT) 42 (H) 10 - 38 U/L    Alk.  phosphatase 112 45 - 117 U/L    Protein, total 8.1 6.4 - 8.2 g/dL    Albumin 3.3 (L) 3.4 - 5.0 g/dL    Globulin 4.8 (H) 2.0 - 4.0 g/dL    A-G Ratio 0.7 (L) 0.8 - 1.7     MAGNESIUM    Collection Time: 02/22/20 11:34 AM   Result Value Ref Range    Magnesium 2.1 1.6 - 2.6 mg/dL   URINALYSIS W/ RFLX MICROSCOPIC    Collection Time: 02/22/20 11:47 AM   Result Value Ref Range    Color YELLOW      Appearance CLEAR      Specific gravity 1.018 1.005 - 1.030      pH (UA) 6.5 5.0 - 8.0      Protein NEGATIVE  NEG mg/dL    Glucose NEGATIVE  NEG mg/dL    Ketone NEGATIVE  NEG mg/dL    Bilirubin NEGATIVE  NEG      Blood NEGATIVE  NEG      Urobilinogen 0.2 0.2 - 1.0 EU/dL    Nitrites NEGATIVE  NEG      Leukocyte Esterase MODERATE (A) NEG     URINE MICROSCOPIC ONLY    Collection Time: 02/22/20 11:47 AM   Result Value Ref Range    WBC 4 to 10 0 - 4 /hpf    RBC NONE 0 - 5 /hpf    Epithelial cells 3+ 0 - 5 /lpf    Bacteria FEW (A) NEG /hpf    Mucus 3+ (A) NEG /lpf       Radiologic Studies -   No orders to display     CT Results  (Last 48 hours)    None        CXR Results  (Last 48 hours)    None            Medical Decision Making   I am the first provider for this patient. I reviewed the vital signs, available nursing notes, past medical history, past surgical history, family history and social history. Vital Signs-Reviewed the patient's vital signs. Records Reviewed: Raina Shah PA-C     Procedures:  Procedures    Provider Notes (Medical Decision Making): Impression:  UTI, fatigue    Labs essentially unremarkable with the exception of mild elevation in LFTs. UA shows likely mild UTI, sent for culture. First dose of cipro given in the ED. IV fluids given . Sx  Improving. Will plan to d.c with cipro and pcp follow-up. Pt agrees. Raina Shah PA-C     MED RECONCILIATION:  Current Facility-Administered Medications   Medication Dose Route Frequency    ciprofloxacin HCl (CIPRO) tablet 500 mg  500 mg Oral NOW     Current Outpatient Medications   Medication Sig    cholecalciferol (VITAMIN D3) (5000 Units /125 mcg) capsule one pill    ARIPiprazole (ABILIFY) 5 mg tablet Take 5 mg by mouth daily.  ciprofloxacin HCl (CIPRO) 500 mg tablet Take 1 Tab by mouth two (2) times a day for 7 days.  sertraline (ZOLOFT) 100 mg tablet Take 100 mg by mouth daily.  amLODIPine (NORVASC) 10 mg tablet Take 10 mg by mouth daily.  aspirin 81 mg chewable tablet Take 81 mg by mouth daily.  diclofenac (VOLTAREN) 1 % gel Apply 4 g to affected area four (4) times daily. Disposition:  D/c    DISCHARGE NOTE:   Patient is stable for discharge at this time. I have discussed all the findings from today's work up with the patient, including lab results and imaging. I have answered all questions. Rx for cipro given. Rest and close follow-up with the PCP recommended this week. Return to the ED immediately for any new or worsening symptoms.   April Montgomery Leyden, PA-C     Follow-up Information     Follow up With Specialties Details Why Torie Wells MD Family Practice Schedule an appointment as soon as possible for a visit in 1 week  [address] [id number]  [phone] [address] [facility] EMERGENCY DEPT Emergency Medicine  As needed, If symptoms worsen [address] [facility]  [phone]          Current Discharge Medication List      START taking these medications    Details   ciprofloxacin HCl (CIPRO) 500 mg tablet Take 1 Tab by mouth two (2) times a day for 7 days. Qty: 14 Tab, Refills: 0                 Diagnosis     Clinical Impression:   1. Acute cystitis without hematuria    2.  Fatigue, unspecified type

## 2020-02-24 ENCOUNTER — OFFICE VISIT (OUTPATIENT)
Dept: ORTHOPEDIC SURGERY | Age: 70
End: 2020-02-24

## 2020-02-24 VITALS
HEIGHT: 64 IN | DIASTOLIC BLOOD PRESSURE: 68 MMHG | SYSTOLIC BLOOD PRESSURE: 149 MMHG | TEMPERATURE: 98 F | HEART RATE: 89 BPM | OXYGEN SATURATION: 100 % | RESPIRATION RATE: 16 BRPM | BODY MASS INDEX: 25.61 KG/M2 | WEIGHT: 150 LBS

## 2020-02-24 DIAGNOSIS — M54.50 ACUTE BILATERAL LOW BACK PAIN WITHOUT SCIATICA: Primary | ICD-10-CM

## 2020-02-24 LAB
BACTERIA SPEC CULT: NORMAL
SERVICE CMNT-IMP: NORMAL

## 2020-02-24 NOTE — PROGRESS NOTES
Chief complaint/History of Present Illness:  Chief Complaint   Patient presents with    Back Pain     lower back pain     HPI  Jermaine Pichardo is a  78 y.o.  female      HISTORY OF PRESENT ILLNESS:  The patient comes in today as a new patient referred by Dr. Joel Montoya for low back pain. She states she has had low back pain for about two to three months. She did not really have an incident that really caused it, but she states she did fall out of her bed January 17, 2020, but states she did not really get hurt. Her pain is worse with standing, lifting, and bending. She does not have any leg pain. PAST MEDICAL HISTORY:  Hypertension, hyperlipidemia, depression. PAST SURGICAL HISTORY:  Bilateral tubal ligation. She is retired. She is a nonsmoker. She denies fever. She has no bowel or bladder dysfunction. She denies any leg pain. PHYSICAL EXAM:  Ms. Michael Roman is a 59-year-old female. She is alert and oriented. She has a normal mood and affect. She has a full weightbearing, non-antalgic gait, a little bit slow using a quadricep cane. She has 4/5 strength of the bilateral lower extremities and negative straight leg raise. She has pain with hyperextension of the lumbar spine. ASSESSMENT/PLAN:  This is a patient with acute back pain. I have offered her steroids. She does not wish to do that. We are going to put her in some physical therapy to see if we can help with that back pain. We will see her back in about three months or sooner if needed.        Review of systems:    Past Medical History:   Diagnosis Date    Cystitis     Depression     High cholesterol     Hypertension     Near syncope 01/13/2020    Scalp contusion 01/17/2020     Past Surgical History:   Procedure Laterality Date    COLONOSCOPY N/A 1/27/2020    COLONOSCOPY with polypectomy performed by Jomar Gibson MD at [address] Marital status: SINGLE     Spouse name: Not on file    Number of children: Not on file    Years of education: Not on file    Highest education level: Not on file   Occupational History    Not on file   Social Needs    Financial resource strain: Not on file    Food insecurity:     Worry: Not on file     Inability: Not on file    Transportation needs:     Medical: Not on file     Non-medical: Not on file   Tobacco Use    Smoking status: Never Smoker    Smokeless tobacco: Never Used   Substance and Sexual Activity    Alcohol use: Not Currently    Drug use: Never    Sexual activity: Not Currently   Lifestyle    Physical activity:     Days per week: Not on file     Minutes per session: Not on file    Stress: Not on file   Relationships    Social connections:     Talks on phone: Not on file     Gets together: Not on file     Attends Evangelical service: Not on file     Active member of club or organization: Not on file     Attends meetings of clubs or organizations: Not on file     Relationship status: Not on file    Intimate partner violence:     Fear of current or ex partner: Not on file     Emotionally abused: Not on file     Physically abused: Not on file     Forced sexual activity: Not on file   Other Topics Concern    Not on file   Social History Narrative    Not on file     No family history on file. Physical Exam:  Visit Vitals  /68 (BP 1 Location: Left arm, BP Patient Position: Sitting)   Pulse 89   Temp 98 °F (36.7 °C) (Oral)   Resp 16   Ht 5' 4\" (1.626 m)   Wt 150 lb (68 kg)   SpO2 100%   BMI 25.75 kg/m²     Pain Scale: 5/10       has been . reviewed and is appropriate          Diagnoses and all orders for this visit:    1. Acute bilateral low back pain without sciatica  -     AMB POC XRAY, SPINE, LUMBOSACRAL; 2 O  -     REFERRAL TO PHYSICAL THERAPY            Follow-up and Dispositions    · Return in about 3 months (around 5/24/2020) for with NP.              We have informed Ruby Alvarado to notify us for immediate appointment if she has any worsening neurogical symptoms or if an emergency situation presents, then call [phone]

## 2020-02-25 ENCOUNTER — APPOINTMENT (OUTPATIENT)
Dept: PHYSICAL THERAPY | Age: 70
End: 2020-02-25
Payer: MEDICARE

## 2020-02-27 ENCOUNTER — APPOINTMENT (OUTPATIENT)
Dept: PHYSICAL THERAPY | Age: 70
End: 2020-02-27
Payer: MEDICARE

## 2020-03-10 ENCOUNTER — HOSPITAL ENCOUNTER (OUTPATIENT)
Dept: PHYSICAL THERAPY | Age: 70
Discharge: HOME | End: 2020-03-10
Payer: MEDICARE

## 2020-03-10 PROCEDURE — 97110 THERAPEUTIC EXERCISES: CPT

## 2020-03-10 PROCEDURE — 97161 PT EVAL LOW COMPLEX 20 MIN: CPT

## 2020-03-10 PROCEDURE — 97530 THERAPEUTIC ACTIVITIES: CPT

## 2020-03-10 NOTE — PROGRESS NOTES
PT DAILY TREATMENT NOTE/LUMBAR EVAL 10-18    Patient Name: Anil Carbajal  Date:3/10/2020  : 1950  [x]  Patient  Verified  Payor: [employer] / Plan: [address] [place] [address] HMO / Product Type: Managed Care Medicare /    In time: 3:05  Out time: 4:01  Total Treatment Time (min): 56  Visit #: 1 of 16    Medicare/BCBS Only   Total Timed Codes (min):  25 1:1 Treatment Time:  51     Treatment Area: Low back pain [M54.5]  SUBJECTIVE  Pain Level (0-10 scale): 9/10  []constant []intermittent []improving []worsening []no change since onset    Any medication changes, allergies to medications, adverse drug reactions, diagnosis change, or new procedure performed?: [x] No    [] Yes (see summary sheet for update)   Subjective functional status/changes:     PLOF: using LBQC occasionally, several steps to enter/exit; walking for exercises (about 30 min)  Limitations to PLOF:   Mechanism of Injury:   Current symptoms/Complaints: Ms. Kristi Ferrari is a 77 y/o, F pt with CC of LBP. Pt reports chronic back pain which aggravated about several months ago. Pt reports degenerative problem with her spine. Pt also has occasional swelling with B knees. Previous Treatment/Compliance:   PMHx/Surgical Hx:   Work Hx:   Living Situation:   Pt Goals: I hope to ease some of back pain and to help some of the weak in back  Barriers: []pain []financial []time []transportation []other  Motivation:   Substance use: []Alcohol []Tobacco []other:   FABQ Score: []low []elevate  Cognition: A & O x     Other:    OBJECTIVE/EXAMINATION  Domestic Life:   Activity/Recreational Limitations:   Mobility:   Self Care:          Modality rationale: decrease pain and increase tissue extensibility to improve the patients ability to tolerate ADLs   Min Type Additional Details    [] Estim:  []Unatt       []IFC  []Premod                        []Other:  []w/ice   []w/heat  Position:  Location:    [] Estim: []Att    []TENS instruct  []NMES []Other:  []w/US   []w/ice   []w/heat  Position:  Location:    []  Traction: [] Cervical       []Lumbar                       [] Prone          []Supine                       []Intermittent   []Continuous Lbs:  [] before manual  [] after manual    []  Ultrasound: []Continuous   [] Pulsed                           []1MHz   []3MHz Location:  W/cm2:    []  Iontophoresis with dexamethasone         Location: [] Take home patch   [] In clinic   15 (10 min during TA) []  Ice     [x]  heat  []  Ice massage  []  Laser   []  Anodyne Position: upine  Location: back     []  Laser with stim  []  Other: Position:  Location:    []  Vasopneumatic Device Pressure:       [] lo [] med [] hi   Temperature: [] lo [] med [] hi   [] Skin assessment post-treatment:  []intact []redness- no adverse reaction    []redness  adverse reaction:     26 min [x]Eval                  []Re-Eval       10 min Therapeutic Exercise:  [x] See flow sheet : HEP   Rationale: increase ROM and increase strength to improve the patients ability to amb and perform ADLs with ease    15 min Therapeutic Activity:  []  See flow sheet :Pt edu within scope of practice on prognosis, POC, posture, lumbar roll support, modalities use, positioning, AD.      Rationale: increase ROM, increase strength, improve coordination and increase proprioception  to improve the patients ability to amb and perform ADL with more ease       With   [] TE   [] TA   [] neuro   [] other: Patient Education: [x] Review HEP    [] Progressed/Changed HEP based on:   [] positioning   [] body mechanics   [] transfers   [] heat/ice application    [] other:      Other Objective/Functional Measures:     Physical Therapy Evaluation - Lumbar Spine ([facility])    SUBJECTIVE  Chief Complaint:    Mechanism of injury:    Symptoms:  Aggravated by:   [] Bending [] Sitting [] Standing [] Walking   [] Moving [] Cough [] Sneeze [] Valsalva   [] AM  [] PM  Lying:  [] sup   [] pro   [] sidelying   [] Other:     Eased by: moist heat   [] Bending [] Sitting [] Standing [] Walking   [] Moving [] AM  [] PM  Lying: [] sup  [] pro  [] sidelying   [] Other:     General Health:  Red Flags Indicated? [] Yes    [] No  [] Yes [] No Recent weight change (If yes, due to dieting?  [] Yes  [] No)   [x] Yes [] No Weakness in legs during walking  [] Yes [] No Unremitting pain at night  [] Yes [] No Abdominal pain or problems  [] Yes [] No Rectal bleeding  [] Yes [] No Feet more cold or painful in cold weather  [] Yes [] No Menstrual irregularities  [] Yes [] No Blood or pain with urination  [] Yes [x] No Dysfunction of bowel or bladder  [] Yes [] No Recent illness within past 3 weeks (i.e, cold, flu)  [] Yes [x] No Numbness/tingling in buttock/genitalia region    Past History/Treatments:     Diagnostic Tests: [] Lab work [x] X-rays    [] CT [] MRI     [] Other:  Results: arthritis, degenerative problem per pt report    Functional Status  Prior level of function:  Present functional limitations:  What position do you sleep in?:    OBJECTIVE  Posture:  Lateral Shift: [] R    [] L     [] +  [] -  Kyphosis: [] Increased [] Decreased   []  WNL  Lordosis:  [x] Increased [] Decreased   [] WNL  Pelvic symmetry: [] WNL    [] Other:    Gait:  [] Normal     [x] Abnormal: antalgic with LBQC    Active Movements: [] N/A   [] Too acute   [x] Other: WFL  ROM % AROM % PROM Comments:pain, area   Forward flexion 40-60      Extension 20-30      SB right 20-30      SB left 20-30      Rotation right 5-10      Rotation left 5-10          Neuro Screen [x] WNL Myotome/Dermatome  /Reflexes:  Comments:    Dural Mobility:  SLR Sitting: [] R    [] L    [] +    [] -  @ (degrees):           Supine: [] R    [] L    [] +    [] -  @ (degrees):   Slump Test: [] R    [] L    [] +    [] -  @ (degrees):   Prone Knee Bend: [] R    [] L    [] +    [] -     Palpation  [] Min  [] Mod  [] Severe    Location:  [] Min  [] Mod  [] Severe    Location:  [] Min  [] Mod  [] Severe    Location:    Strength   L(0-5) R (0-5) N/T   Hip Flexion (L1,2) 4+ 4+ []   Knee Extension (L3,4) 5 5 []   Ankle Dorsiflexion (L4) 4+ 4+ []   Great Toe Extension (L5) 5 5 []   Ankle Plantarflexion (S1) ~3 ~3 []   Knee Flexion (S1,2) 4- 4- []   Upper Abdominals   []   Lower Abdominals   []   Paraspinals   []   Back Rotators   []   Gluteus Hany 3+ 3+ []   Other   []   Glute med: 3-/5 B    Special Tests  Lumbar:  Lumb. Compression: [] Pos  [] Neg               Lumbar Distraction:   [] Pos  [] Neg    Quadrant:  [] Pos  [] Neg   [] Flex  [] Ext    Sacroilliac:  Gaenslen's: [] R    [] L    [] +    [] -     Compression: [] +    [] -     Gapping:  [] +    [] -     Thigh Thrust: [] R    [] L    [] +    [] -     Leg Length: [] +    [] -   Position:    Crests:    ASIS:    PSIS:    Sacral Sulcus:    Mobility: Standing flex:     Sitting flex:     Supine to sit:     Prone knee bend:         Hip: Carlen Primes:  [] R    [] L    [] +    [] -     Scour:  [] R    [] L    [] +    [] -     Piriformis: [x] R    [x] L    [x] +    [x] -          Deficits: Sarah's: [] R    [] L    [] +    [] -     Danny: [x] R    [x] L    [x] +    [] -     Hamstrings 90/90:    Gastrocsoleus (to neutral): Right: Left:       Global Muscular Weakness:  Abdominals:  Quadratus Lumborum:  Paraspinals: Other:    Other tests/comments:   Left upslip    Pain Level (0-10 scale) post treatment: 7/10    ASSESSMENT/Changes in Function: see POC    Patient will continue to benefit from skilled PT services to modify and progress therapeutic interventions, address functional mobility deficits, address ROM deficits, address strength deficits, analyze and address soft tissue restrictions, analyze and cue movement patterns, analyze and modify body mechanics/ergonomics, assess and modify postural abnormalities and instruct in home and community integration to attain remaining goals.      [x]  See Plan of Care  []  See progress note/recertification  []  See Discharge Summary         Progress towards goals / Updated goals:  See POC    PLAN  [x]  Upgrade activities as tolerated     [x]  Continue plan of care  []  Update interventions per flow sheet       []  Discharge due to:_  []  Other:_    Yesenia Haynes, PT 3/10/2020  3:01 PM

## 2020-03-10 NOTE — PROGRESS NOTES
[facility]  Laurie Harrison  [address] [facility]  [phone] fax    Plan of Care/ Statement of Necessity for Physical Therapy Services    Patient name: Alexander Dupont Start of Care: 3/10/2020   Referral source: Lillian Weber NP : 1950    Medical Diagnosis: Low back pain [M54.5]  Payor: Xander Garibay / Plan: [address] [place] [address] HMO / Product Type: Managed Care Medicare /  Onset Date:aggravated about several months    Treatment Diagnosis: LBP   Prior Hospitalization: see medical history Provider#: [id number]   Medications: Verified on Patient summary List    Comorbidities: depression, arthritis, HTN, hearing impaired    Prior Level of Function: using LBQC occasionally, several steps to enter/exit; walking for exercises (about 30 min)      The Plan of Care and following information is based on the information from the initial evaluation. Assessment/ boateng information: Ms. Staci Hernandez is a 79 y/o, F pt with CC of LBP. Pt reports chronic back pain which aggravated about several months ago. Pt reports degenerative problem with her spine. Pt also has occasional swelling with B knees. Pt present with WFL AROM, increased ant Pelvic tilt posture, fair core strength and mod tightness of B hips flexors, quad, and ER/piriformis. Also noted Left upslip, TTP along T10-L3 paraspinal muscles. WNL with dermatomes and myotomes screening. Pt would benefit from skilled PT to address these deficits above for pain free functional mobility.     Evaluation Complexity History HIGH Complexity :3+ comorbidities / personal factors will impact the outcome/ POC ; Examination MEDIUM Complexity : 3 Standardized tests and measures addressing body structure, function, activity limitation and / or participation in recreation  ;Presentation LOW Complexity : Stable, uncomplicated  ;Clinical Decision Making MEDIUM Complexity : FOTO score of 26-74  Overall Complexity Rating: LOW   Problem List: pain affecting function, decrease strength, edema affecting function, impaired gait/ balance, decrease ADL/ functional abilitiies, decrease activity tolerance, decrease flexibility/ joint mobility and decrease transfer abilities   Treatment Plan may include any combination of the following: Therapeutic exercise, Therapeutic activities, Neuromuscular re-education, Physical agent/modality, Gait/balance training, Manual therapy, Patient education, Self Care training, Functional mobility training, Home safety training and Stair training  Patient / Family readiness to learn indicated by: asking questions, trying to perform skills and interest  Persons(s) to be included in education: patient (P)  Barriers to Learning/Limitations: none  Patient Goal (s): I hope to ease some of back pain and to help some of the weak in back  Patient Self Reported Health Status: fair  Rehabilitation Potential: good    Short term goals: To be accomplished within 1 week  1. Pt will be independent with HEP to maintain progression. Eval status: given and reviewed HEP     Long term goals: To be accomplished within 4 weeks  1. Pt will improve FOTO score by 18 points to 58/100 to show improvement with functional mobility performance. Eval status: 40                2. Pt will report no more than 1-2/10 to improve QOL. Eval status: 5-9/10                3. Pt will be able to amb for 30 min or more with 0-1/10 pain to improve her QOL. Eval status: 30     Frequency / Duration: Patient to be seen 2-3 times per week for 8 weeks.     Patient/ Caregiver education and instruction: Diagnosis, prognosis, self care, activity modification, brace/ splint application and exercises   [x]  Plan of care has been reviewed with PTA    Certification Period: 3- to 4-9-2020    Rich Dominguez PT 3/10/2020 3:02 PM    ________________________________________________________________________    I certify that the above Therapy Services are being furnished while the patient is under my care. I agree with the treatment plan and certify that this therapy is necessary.     Physician's Signature:____________Date:_________TIME:________    ** Signature, Date and Time must be completed for valid certification **    Please sign and return to [facility]  [facility]  [address] [place] [address]  [phone] fax

## 2020-03-13 ENCOUNTER — HOSPITAL ENCOUNTER (OUTPATIENT)
Dept: PHYSICAL THERAPY | Age: 70
Discharge: HOME | End: 2020-03-13
Payer: MEDICARE

## 2020-03-13 PROCEDURE — 97110 THERAPEUTIC EXERCISES: CPT

## 2020-03-13 PROCEDURE — 97140 MANUAL THERAPY 1/> REGIONS: CPT

## 2020-03-13 NOTE — PROGRESS NOTES
PT DAILY TREATMENT NOTE 10-18    Patient Name: Carley Lawrence  Date:3/13/2020  : 1950  [x]  Patient  Verified  Payor: Jaycee Kimdayna / Plan: [address] [place], [place] [address]Guy Firth HMO / Product Type: Managed Care Medicare /    In time:2:40  Out time:3:25  Total Treatment Time (min): 45  Visit #: 2 of 24    Medicare/BCBS Only   Total Timed Codes (min):  35 1:1 Treatment Time:  30       Treatment Area: Low back pain [M54.5]    SUBJECTIVE  Pain Level (0-10 scale): 6/10  Any medication changes, allergies to medications, adverse drug reactions, diagnosis change, or new procedure performed?: [x] No    [] Yes (see summary sheet for update)  Subjective functional status/changes:   [] No changes reported  Pt reports doing her exercises at home. She states she likes to walk. She notes central aching low back pain. Her knees bother her sometimes too. OBJECTIVE    25 min Therapeutic Exercise:  [x] See flow sheet :   Rationale: increase ROM, increase strength and improve coordination to improve the patients ability to increase standing and walking tolerance    10 min Manual Therapy:  Leg lengthening for left upslip; MET for right AI; shotgun technique; MET for left/left sacral torsion; MET for ERSL L4-L5    TPR L5-S1 multifidi   Rationale: decrease pain, increase ROM and increase tissue extensibility to increased standing and walking tolerance          With   [] TE   [] TA   [] neuro   [] other: Patient Education: [x] Review HEP    [] Progressed/Changed HEP based on:   [] positioning   [] body mechanics   [] transfers   [] heat/ice application    [] other:      Other Objective/Functional Measures:   Decreased pain to 4/10 post manual  Educated on core holds throughout the day  Instructed in log rolling to get up/down from the table  Cues for form with pallof press  No increased pain during session     Pain Level (0-10 scale) post treatment: 410    ASSESSMENT/Changes in Function: Initiated exercise program per POC. Pt compliant with initial HEP. She has not been able to ambulate prolonged periods due to B knee pain and central low back pain. She does have a pelvic obliquity but no significant decrease in pain with correction. Will focus on core/hip strength for decompression of the l/s to improve ease of ambulation. Progress towards goals / Updated goals:  Short term goals: To be accomplished within 1 week  1. Pt will be independent with HEP to maintain progression. Eval status: given and reviewed HEP   Met  Long term goals: To be accomplished within 4 weeks  1. Pt will improve FOTO score by 18 points to 58/100 to show improvement with functional mobility performance. Eval status: 40   2. Pt will report no more than 1-2/10 to improve QOL. Eval status: 5-9/10   3. Pt will be able to amb for 30 min or more with 0-1/10 pain to improve her QOL.   Eval status: 30     PLAN  [x]  Upgrade activities as tolerated     [x]  Continue plan of care  []  Update interventions per flow sheet       []  Discharge due to:_  []  Other:_      Brian Apple PTA 3/13/2020  1:50 PM    Future Appointments   Date Time Provider Bharat Ariane   3/13/2020  2:30 PM Ginny Lama [facility]   3/17/2020  3:00 PM Ivon Watson PTA [facility]   3/20/2020  2:30 PM Ivon Watson PTA [facility]   3/24/2020 12:30 PM [facility]   3/27/2020  2:30 PM Ivon Watson, PTA [facility]   3/31/2020  2:30 PM Mayra Puri [facility]   4/3/2020  2:30 PM Liseth Pandya [facility]   5/26/2020 11:30 AM Dmitriy Parnell,  [address]

## 2020-03-17 ENCOUNTER — HOSPITAL ENCOUNTER (OUTPATIENT)
Dept: PHYSICAL THERAPY | Age: 70
Discharge: HOME | End: 2020-03-17
Payer: MEDICARE

## 2020-03-17 PROCEDURE — 97110 THERAPEUTIC EXERCISES: CPT

## 2020-03-20 ENCOUNTER — APPOINTMENT (OUTPATIENT)
Dept: PHYSICAL THERAPY | Age: 70
End: 2020-03-20
Payer: MEDICARE

## 2020-03-24 ENCOUNTER — APPOINTMENT (OUTPATIENT)
Dept: PHYSICAL THERAPY | Age: 70
End: 2020-03-24
Payer: MEDICARE

## 2020-03-27 ENCOUNTER — APPOINTMENT (OUTPATIENT)
Dept: PHYSICAL THERAPY | Age: 70
End: 2020-03-27
Payer: MEDICARE

## 2020-03-31 ENCOUNTER — APPOINTMENT (OUTPATIENT)
Dept: PHYSICAL THERAPY | Age: 70
End: 2020-03-31
Payer: MEDICARE

## 2020-04-03 ENCOUNTER — APPOINTMENT (OUTPATIENT)
Dept: PHYSICAL THERAPY | Age: 70
End: 2020-04-03

## 2020-05-26 ENCOUNTER — VIRTUAL VISIT (OUTPATIENT)
Dept: ORTHOPEDIC SURGERY | Age: 70
End: 2020-05-26

## 2020-05-26 DIAGNOSIS — M54.50 ACUTE BILATERAL LOW BACK PAIN WITHOUT SCIATICA: Primary | ICD-10-CM

## 2020-05-26 NOTE — PROGRESS NOTES
History of Present Illness:    Consent: Amira Ulloa, who was seen by telephone call ariel and/or her healthcare decision maker, is aware that this patient-initiated, Telehealth encounter on 5/26/2020 is a billable service, with coverage as determined by her insurance carrier. She is aware that she may receive a bill and has provided verbal consent to proceed: Yes. The provider was located at my office and the patient was located at their residence. No one else participated in the visit with the patient. The platform used was telephone call    Patient was evaluated for her low back pain she does not have radiating leg pain. She does have bilateral knee pain. We last saw her as a new patient and prescribed physical therapy. She did go to [facility] [address] for a couple visits. The pandemic then because that location to close down. They did print off exercise papers for her to do and she has been doing them. She states it does help some. But she still has some back pain. She would like to continue to physical therapy when she is able to go to the location. She denies any new medical issues. She is a non-smoker. She denies fever bowel bladder dysfunction. Physical Exam: Patient is a 71-year-old female. She was alert and oriented. She spoke with fluency. She did not appear to be short of breath. Assessment & Plan: This is a patient with chronic low back pain. She has benefited somewhat from physical therapy and doing a home exercise program.  I will reorder the physical therapy and hopefully she will be able to go to the location continue it. We will see her back when she completes the physical therapy. Diagnoses and all orders for this visit:    1.  Acute bilateral low back pain without sciatica  -     REFERRAL TO PHYSICAL THERAPY          Pain Scale: /10         has been reviewed and is appropriate          We discussed the expected course, resolution and complications of the diagnosis(es) in detail. Medication risks, benefits, costs, interactions, and alternatives were discussed as indicated. I advised her to contact the office if her condition worsens, changes or fails to improve as anticipated. For emergencies or worsening neurological symptoms the patient was instructed to call 911. She expressed understanding with the diagnosis(es) and plan. Follow-up and Dispositions    · Return in about 3 months (around 8/26/2020) for once completed with PT.           1    Blanca Santos is a 78 y.o. female being evaluated by a video visit encounter for concerns as above. A caregiver was present when appropriate. Due to this being a TeleHealth encounter (During [id number] emergency), evaluation of the following organ systems was limited: Vitals/Constitutional/EENT/Resp/CV/GI//MS/Neuro/Skin/Heme-Lymph-Imm. Pursuant to the emergency declaration under the [address] [place] [address], [facility] authority and the [employer] Act, this Virtual  Visit was conducted, with patient's (and/or legal guardian's) consent, to reduce the patient's risk of exposure to COVID-19 and provide necessary medical care. CPT Codes 69314-82749 for Established Patients may apply to this Telehealth Visit  Time-based coding, delete if not needed: I spent at least 15 minutes with this established patient, and >50% of the time was spent counseling and/or coordinating care regarding Back pain  Start time: 10:05 AM and Stop time: 10:25 AM.        Due to this being a TeleHealth evaluation, many elements of the physical examination are unable to be assessed.        Pursuant to the emergency declaration under the [employer] [facility], [facility] waiver authority and the [employer] Act, this Virtual  Visit was conducted, with patient's consent, to reduce the patient's risk of exposure to COVID-19 and provide continuity of care for an established patient. Services were provided through a video synchronous discussion virtually to substitute for in-person clinic visit.     Melia Bui NP

## 2020-06-15 NOTE — PROGRESS NOTES
[facility]ivas  [address] [facility]  [phone] fax    Physical Therapy Discharge Summary    Patient name: Carley Lawrence Start of Care: 3/10/2020   Referral source: Gabino Jose NP : 1950               Medical Diagnosis: Low back pain [M54.5]  Payor: Jaycee Yoder / Plan: [address] [place] [address] HMO / Product Type: [employer] Medicare /  Onset Date:aggravated about several months               Treatment Diagnosis: LBP   Prior Hospitalization: see medical history Provider#: [id number]   Medications: Verified on Patient summary List    Comorbidities: depression, arthritis, HTN, hearing impaired    Prior Level of Function: using LBQC occasionally, several steps to enter/exit; walking for exercises (about 30 min)    Visits from Start of Care: 3    Missed Visits: 0    Reporting Period : 3/10/20 to 3/17/20    Summary of Care:  Unable to further assess due to COVID-19 restrictions. Pt declined telehealth services and will be discharged from outpatient therapy at this time.     ASSESSMENT/RECOMMENDATIONS:  [x]Discontinue therapy: []Patient has reached or is progressing toward set goals      []Patient is non-compliant or has abdicated      []Due to lack of appreciable progress towards set goals      X: COVID-19    Clarissa Arthur, PT 6/15/2020 9:08 AM

## 2020-06-30 ENCOUNTER — APPOINTMENT (OUTPATIENT)
Dept: PHYSICAL THERAPY | Age: 70
End: 2020-06-30

## 2020-07-07 ENCOUNTER — HOSPITAL ENCOUNTER (OUTPATIENT)
Dept: PHYSICAL THERAPY | Age: 70
Discharge: HOME | End: 2020-07-07
Payer: MEDICARE

## 2020-07-07 PROCEDURE — 97161 PT EVAL LOW COMPLEX 20 MIN: CPT

## 2020-07-07 PROCEDURE — 97530 THERAPEUTIC ACTIVITIES: CPT

## 2020-07-07 NOTE — PROGRESS NOTES
PT DAILY TREATMENT NOTE 10-18    Patient Name: Andres Espinoza  Date:2020  : 1950  [x]  Patient  Verified  Payor: BLUE CROSS MEDICARE / Plan: [facility] [place]  HMO / Product Type: Managed Care Medicare /    In time:2:00  Out time:2:35  Total Treatment Time (min): 35  Visit #: 1 of 10    Medicare/BCBS Only   Total Timed Codes (min):  15 1:1 Treatment Time:  35       Treatment Area: Low back pain [M54.5]    SUBJECTIVE  Pain Level (0-10 scale): 5  Any medication changes, allergies to medications, adverse drug reactions, diagnosis change, or new procedure performed?: [x] No    [] Yes (see summary sheet for update)  Subjective functional status/changes:   [] No changes reported  See POC    OBJECTIVE    20 min [x]Eval                  []Re-Eval       5 min Therapeutic Exercise:  [] See flow sheet :   Rationale: increase ROM and increase strength to improve the patients ability to self-manage symtoms. 10 min Therapeutic Activity:  []  See flow sheet : Patient education on therapy assessment, prognosis, expectations for therapy sessions, patient goals, role of improved hip strength/mobility in decreasing pain, and HEP. Rationale: to improve the patients ability to adhere to HEP and therapy sessions for increased compliance when working toward therapy goals.          With   [] TE   [x] TA   [] neuro   [] other: Patient Education: [x] Review HEP    [] Progressed/Changed HEP based on:   [] positioning   [] body mechanics   [] transfers   [] heat/ice application    [] other:      Other Objective/Functional Measures: See POC     Pain Level (0-10 scale) post treatment: 5    ASSESSMENT/Changes in Function: See POC    Patient will continue to benefit from skilled PT services to modify and progress therapeutic interventions, address functional mobility deficits, address ROM deficits, address strength deficits, analyze and address soft tissue restrictions, analyze and cue movement patterns, analyze and modify body mechanics/ergonomics, assess and modify postural abnormalities, address imbalance/dizziness and instruct in home and community integration to attain remaining goals.      [x]  See Plan of Care  []  See progress note/recertification  []  See Discharge Summary         Progress towards goals / Updated goals:  See POC    PLAN  [x]  Upgrade activities as tolerated     []  Continue plan of care  [x]  Update interventions per flow sheet       []  Discharge due to:_  []  Other:_      Gwenlyn Leyden 7/7/2020  9:57 AM    Future Appointments   Date Time Provider Bharat Thakkar   7/7/2020  2:00 PM Yamil Colmenares [facility]

## 2020-07-07 NOTE — PROGRESS NOTES
[facility]  [address] [place] [address]  [phone] fax    Plan of Care/ Statement of Necessity for Physical Therapy Services    Patient name: Dominique Parks Start of Care: 2020   Referral source: Dixie Dubon NP : 1950    Medical Diagnosis: Low back pain [M54.5]  Payor: Matteo Gonzalez / Plan: [facility] [address] HMO / Product Type: Managed Care Medicare /  Onset Date:20    Treatment Diagnosis: low back pain, impaired gait/mobility   Prior Hospitalization: see medical history Provider#: [id number]   Medications: Verified on Patient summary List   Comorbidities: depression, high cholesterol, arthritis, HTN, left knee pain   Prior Level of Function: using LBQC occasionally, 3 steps to enter home with B handrail; functionally independent    The Plan of Care and following information is based on the information from the initial evaluation. Assessment/ key information: Pt is a pleasant 78 y.o. female who presents with c/o bilateral low back pain. The patient reports a chronic history of low back pain that has been exacerbated recently within the past few month, limiting her standing ability and household tasks. Signs/symptoms at eval consistent with mechanical low back pain of multifactorial origin, with contributing factors such as impaired hip strength/mobiliy, increased sedentary lifestyle, and decreased lumbar spine mobility. Functional deficits include: decreased functional LE strength, impaired single leg stance balance abilities, decreased standing tolerance, and low back pain that restricts carrying/lifting activities. Rehab potential is good due to desire to return to PLOF levels and previous positive experience with skilled rehab.  Pt would benefit from skilled PT to address above deficits to improve Pt's function and ability to return to PLOF activities with improved function and decreased pain.      Evaluation Complexity History HIGH Complexity :3+ comorbidities / personal factors will impact the outcome/ POC ; Examination MEDIUM Complexity : 3 Standardized tests and measures addressing body structure, function, activity limitation and / or participation in recreation  ;Presentation LOW Complexity : Stable, uncomplicated  ;Clinical Decision Making MEDIUM Complexity : FOTO score of 26-74  Overall Complexity Rating: LOW   Problem List: pain affecting function, decrease ROM, decrease strength, edema affecting function, impaired gait/ balance, decrease ADL/ functional abilitiies, decrease activity tolerance, decrease flexibility/ joint mobility and decrease transfer abilities   Treatment Plan may include any combination of the following: Therapeutic exercise, Therapeutic activities, Neuromuscular re-education, Physical agent/modality, Gait/balance training, Manual therapy, Aquatic therapy, Patient education, Self Care training, Functional mobility training, Home safety training and Stair training  Patient / Family readiness to learn indicated by: asking questions  Persons(s) to be included in education: patient (P)  Barriers to Learning/Limitations: None  Patient Goal (s): help relieve pain, help it be lighter  Patient Self Reported Health Status: fair  Rehabilitation Potential: good    Short Term Goals: To be accomplished in 1 week  - Goal: Pt to be compliant with initial HEP to improve patient's ability to independently improve hip strength/mobility and balance abilities  Status at last note/certification: Established and reviewed with Pt  Long Term Goals: To be accomplished in 10 treatments  - Goal: Pt to perform 5x STS test in 12 seconds or less to demo decreased risk for falls and improved functional LE strength.   Status at last note/certification: 18 seconds   - Goal: Pt to lift a 5 lb weight and carry it up 4 steps to facilitate improved ease with carrying groceries inside home after shopping. Status at last note/certification: lifting 5 lbs max; patient gets someone to help bring her groceries up steps onto porch  - Goal: Pt to report ambulation tolerance of at least 60 minutes with pain less than or equal to 2/10 at worst to demonstrate improved ability to perform grocery shopping tasks and community ambulation  Status at last note/certification: 60 minutes with 7-8/10 pain at worst  - Goal: Pt to demonstrate SLS time of at least 10 seconds bilaterally to improve ease of community negotiation with improved safety. Status at last note/certification: SLS right 4 seconds, left 3 seconds   - Goal: Pt to report FOTO score of at least 62 pts to demonstrate improved function and quality of life. Status at last note/certification: FOTO 47 pts       Frequency / Duration: Patient to be seen 2 times per week for 10 treatments. Patient/ CarPatient/ Caregiver education and instruction: Diagnosis, prognosis, activity modification and exercises   [x]  Plan of care has been reviewed with PTA    Lawence Pack 7/7/2020 9:58 AM    ________________________________________________________________________    I certify that the above Therapy Services are being furnished while the patient is under my care. I agree with the treatment plan and certify that this therapy is necessary.     Physician's Signature:____________Date:_________TIME:________    ** Signature, Date and Time must be completed for valid certification **    Please sign and return to [facility]  MARIANN  [address] [place] [address]  [phone] fax

## 2020-07-30 ENCOUNTER — APPOINTMENT (OUTPATIENT)
Dept: PHYSICAL THERAPY | Age: 70
End: 2020-07-30
Payer: MEDICARE

## 2020-08-04 ENCOUNTER — APPOINTMENT (OUTPATIENT)
Dept: PHYSICAL THERAPY | Age: 70
End: 2020-08-04
Payer: MEDICARE

## 2020-08-06 ENCOUNTER — HOSPITAL ENCOUNTER (OUTPATIENT)
Dept: PHYSICAL THERAPY | Age: 70
Discharge: HOME | End: 2020-08-06
Payer: MEDICARE

## 2020-08-06 PROCEDURE — 97164 PT RE-EVAL EST PLAN CARE: CPT

## 2020-08-06 PROCEDURE — 97110 THERAPEUTIC EXERCISES: CPT

## 2020-08-06 NOTE — PROGRESS NOTES
PT DAILY TREATMENT NOTE 10-18    Patient Name: Warden Murray  Date:2020  : 1950  [x]  Patient  Verified  Payor: BLUE CROSS MEDICARE / Plan: [facility] [place]  HMO / Product Type: Managed Care Medicare /    In time:3:17  Out time: 4:02  Total Treatment Time (min): 45  Visit #: 2 of 10     Medicare/BCBS Only   Total Timed Codes (min):  23 1:1 Treatment Time:  35       Treatment Area: Low back pain [M54.5]    SUBJECTIVE  Pain Level (0-10 scale): 10/10   Any medication changes, allergies to medications, adverse drug reactions, diagnosis change, or new procedure performed?: [x] No    [] Yes (see summary sheet for update)  Subjective functional status/changes:   [] No changes reported  Pt reports she is doing her HEP 1-2x per week. She had a hard time finding transportation today. She has increased pain with standing > 30 minutes. She has not been here d/t waiting for insurance authorization. She has a lot of pain today.       OBJECTIVE    Modality rationale: decrease pain and increase tissue extensibility to improve the patients ability to reduce post-exercise soreness and tolerate daily tasks    Min Type Additional Details    [] Estim:  []Unatt       []IFC  []Premod                        []Other:  []w/ice   []w/heat  Position:  Location:    [] Estim: []Att    []TENS instruct  []NMES                    []Other:  []w/US   []w/ice   []w/heat  Position:  Location:    []  Traction: [] Cervical       []Lumbar                       [] Prone          []Supine                       []Intermittent   []Continuous Lbs:  [] before manual  [] after manual    []  Ultrasound: []Continuous   [] Pulsed                           []1MHz   []3MHz W/cm2:  Location:    []  Iontophoresis with dexamethasone         Location: [] Take home patch   [] In clinic   10 []  Ice     [x]  heat  []  Ice massage  []  Laser   []  Anodyne Position: seated  Location: l/s    []  Laser with stim  []  Other: Position:  Location:    []  Vasopneumatic Device Pressure:       [] lo [] med [] hi   Temperature: [] lo [] med [] hi   [x] Skin assessment post-treatment:  [x]intact []redness- no adverse reaction    []redness  adverse reaction:     10  min []Eval                  [x]Re-Eval       23 min Therapeutic Exercise:  [x] See flow sheet :   Rationale: increase ROM, increase strength, improve balance and increase proprioception to improve the patients ability to improve ease/safety with daily tasks         With   [] TE   [] TA   [] neuro   [] other: Patient Education: [x] Review HEP    [] Progressed/Changed HEP based on:   [] positioning   [] body mechanics   [] transfers   [] heat/ice application    [] other:      Other Objective/Functional Measures:     Pt 17 minutes late to therapy     Pt left SBQC in waiting room when coming back to therapy and had to be reminded to use SBQC    156/83, heart rate 91bpm taken electronically prior to therapy      TTP T11-L5 spinous processes and surrounding paraspinals, B SI joints, B glutes     Lumbar % AROM  Flexion 75%  Extension 50%   Left SB 75%  Right SB 50%     Lumbar Repeated movement screen  No change with flexion  Slight pain reduction with extension   No change in pain with left SB  Slight pain reduction with right SB    5x sit to stand: 21 seconds  30 second sit to stand: 7x    Pt rolled eyes when being given instructions for 30 second sit to stand  Pain relief reported with SB x 3     MMT B Hips  Flexion Right 4+/5, Left 4/5   IR 4+/5 B  ER  4=/5 B  Abduction:  Right 3-/5, Left 3+/5     SLS:Right 1 seconds, left 3 seconds      Required tactile cues to achieve correct form with PPT  Pain with bridges, pain always subsided immediately upon ceasing    FOTO 42    Pain 6/10 following therex, 4/10 following heat     Pain Level (0-10 scale) post treatment: 4/10    ASSESSMENT/Changes in Function: See Recert     Patient will continue to benefit from skilled PT services to modify and progress therapeutic interventions, address functional mobility deficits, address ROM deficits, address strength deficits, analyze and address soft tissue restrictions, analyze and cue movement patterns, analyze and modify body mechanics/ergonomics, assess and modify postural abnormalities and instruct in home and community integration to attain remaining goals. []  See Plan of Care  [x]  See progress note/recertification  []  See Discharge Summary         Progress towards goals / Updated goals:  Short Term Goals: To be accomplished in 1 week  - Goal: Pt to be compliant with initial HEP to improve patient's ability to independently improve hip strength/mobility and balance abilities  Status at last note/certification: Established and reviewed with Pt  Current  Not met. Pt reports intermittent compliance. Completing HEP 1-2x per week 8/6/20   Long Term Goals: To be accomplished in 10 treatments  - Goal: Pt to perform 5x STS test in 12 seconds or less to demo decreased risk for falls and improved functional LE strength. Status at last note/certification: 18 seconds   Current. Not met. 21 seconds 8/6/20   - Goal: Pt to lift a 5 lb weight and carry it up 4 steps to facilitate improved ease with carrying groceries inside home after shopping. Status at last note/certification: lifting 5 lbs max; patient gets someone to help bring her groceries up steps onto porch  Current:  Progressing. She is able to take light groceries up the steps independently 8/6/20   - Goal: Pt to report ambulation tolerance of at least 60 minutes with pain less than or equal to 2/10 at worst to demonstrate improved ability to perform grocery shopping tasks and community ambulation  Status at last note/certification: 60 minutes with 7-8/10 pain at worst  Current: Not met. Pain increase to 7/10 with walking 10 blocks total to and from grocery store.   Asked pt multiple times how long it took her to walk this distance and she would not answer 8/6/20   - Goal: Pt to demonstrate SLS time of at least 10 seconds bilaterally to improve ease of community negotiation with improved safety. Status at last note/certification: SLS right 4 seconds, left 3 seconds   Current:  Not met. SLS:Right 1 seconds, left 3 seconds    - Goal: Pt to report FOTO score of at least 62 pts to demonstrate improved function and quality of life. Status at last note/certification: FOTO 47 pts   Current: Not met.   Declined 5 points to 42/100        PLAN  [x]  Upgrade activities as tolerated     [x]  Continue plan of care  []  Update interventions per flow sheet       []  Discharge due to:_  []  Other:_      Ghassan Carver, PT 8/6/2020  3:19 PM    Future Appointments   Date Time Provider Bharat Thakkar   8/11/2020  3:00 PM Kelsi Greenberg, PTA [facility]   8/13/2020  3:00 PM Toni Harrington, PT [facility]

## 2020-08-07 NOTE — PROGRESS NOTES
[facility]  [place] [employer] [facility] MARIANN  [address] [place] [address]  [phone] fax    Continued Plan of Care/ Re-certification for Physical Therapy Services    Patient name: Noé Payton Start of Care: 20   Referral source: Boris Art NP : 1950   Medical/Treatment Diagnosis: Low back pain [M54.5]  Payor: Concepcion Shukla / Plan: [address] HMO / Product Type: Managed Care Medicare /  Onset Date:20     Prior Hospitalization: see medical history Provider#: [id number]   Medications: Verified on Patient Summary List    Comorbidities: depression, high cholesterol, arthritis, HTN, left knee pain   Prior Level of Function: using [facility] occasionally, 3 steps to enter home with B handrail; functionally independent     Visits from Start of Care: 1    Missed Visits: 1    The Plan of Care and following information is based on the patient's current status:  Goal: Pt to be compliant with initial HEP to improve patient's ability to independently improve hip strength/mobility and balance abilities  Status at last note/certification: Not met. Pt reports intermittent compliance. Completing HEP 1-2x per week  Current Status: not met    Goal:Pt to perform 5x STS test in 12 seconds or less to demo decreased risk for falls and improved functional LE strength. Status at last note/certification: Not met. 21 seconds (18 seconds at initial evaluation)   Current Status: not met    Goal: Pt to lift a 5 lb weight and carry it up 4 steps to facilitate improved ease with carrying groceries inside home after shopping. Status at last note/certification: Progressing.   She is able to take light groceries up the steps independently (lifting 5 lbs max; patient gets someone to help bring her groceries up steps onto porch at [facility])  Current Status: not met    Goal: Pt to report ambulation tolerance of at least 60 minutes with pain less than or equal to 2/10 at worst to demonstrate improved ability to perform grocery shopping tasks and community ambulation  Status at last note/certification: Not met. Pain increase to 7/10 with walking 10 blocks total to and from grocery store. Asked pt multiple times how long it took her to walk this distance and she would not answer (60 minutes with 7-8/10 pain at worst at eval)   Current Status: not met    Goal: Pt to demonstrate SLS time of at least 10 seconds bilaterally to improve ease of community negotiation with improved safety. Status at last note/certification: Not met. SLS:Right 1 seconds, left 3 seconds  (Right 4 seconds, Left 3 seconds at eval)  Current Status: not met    Goal: Pt to report FOTO score of at least 62 pts to demonstrate improved function and quality of life. Status at last note/certification: Not met. Declined 5 points to 42/100   Current Status: not met    Key functional changes: no functional changes      Problems/ barriers to goal attainment: lack of therapy attendance and HEP compliance     Problem List: pain affecting function, decrease ROM, decrease strength, impaired gait/ balance, decrease ADL/ functional abilitiies, decrease activity tolerance, decrease flexibility/ joint mobility and decrease transfer abilities    Treatment Plan: Therapeutic exercise, Therapeutic activities, Neuromuscular re-education, Physical agent/modality, Gait/balance training, Manual therapy, Patient education, Self Care training, Functional mobility training, Home safety training and Stair training     Patient Goal (s) has been updated and includes: less pain, better balance     Goals for this certification period to be accomplished in 4 weeks:  1. Pt to be compliant with initial HEP to improve patient's ability to independently improve hip strength/mobility and balance abilities  2. Pt to perform 5x STS test in 12 seconds or less to demo decreased risk for falls and improved functional LE strength.   3.  Pt to lift a 5 lb weight and carry it up 4 steps to facilitate improved ease with carrying groceries inside home after shopping. 4. Pt to report ambulation tolerance of at least 60 minutes with pain less than or equal to 2/10 at worst to demonstrate improved ability to perform grocery shopping tasks and community ambulation  5. Pt to demonstrate SLS time of at least 10 seconds bilaterally to improve ease of community negotiation with improved safety. 6. Pt to report FOTO score of at least 62 pts to demonstrate improved function and quality of life. Status at last note/certification: FOTO 47 pts        Frequency / Duration: Patient to be seen 2 times per week for 4 weeks:    Assessment / Recommendations:Pt has not progressed in therapy as she has only attended 1 session since initial evaluation, and she has been noncompliant with her HEP during her absence. Pt's absence was d/t lack of insurance authorization. She continues to report high pain levels with prolonged ambulation. SLS, 5x sit to stand test, and FOTO score have regressed since initial evaluation. Reviewed HEP this visit and educated patient regarding importance of compliance. Pt will benefit from continued skilled PT with consistent attendance to address strength and static/dynamic balance deficits in order to reduce fall risk and improve ease/safety with daily tasks. Certification Period: 8/7/20 to 9/5/20     Mell Amin, PT 8/7/2020 8:05 AM    ________________________________________________________________________  I certify that the above Therapy Services are being furnished while the patient is under my care. I agree with the treatment plan and certify that this therapy is necessary. [] I have read the above and request that my patient continue as recommended.   [] I have read the above report and request that my patient continue therapy with the following changes/special instructions: _______________________________________  [] I have read the above report and request that my patient be discharged from therapy    Physician's Signature:____________Date:_________TIME:________    ** Signature, Date and Time must be completed for valid certification **    Please sign and return to [facility] Viji Dubon  [address] [facility]  [phone] fax

## 2020-08-11 ENCOUNTER — HOSPITAL ENCOUNTER (OUTPATIENT)
Dept: PHYSICAL THERAPY | Age: 70
Discharge: HOME | End: 2020-08-11
Payer: MEDICARE

## 2020-08-11 PROCEDURE — 97110 THERAPEUTIC EXERCISES: CPT

## 2020-08-11 NOTE — PROGRESS NOTES
PT DAILY TREATMENT NOTE 10-18    Patient Name: Jose Dewey [facility]  Date:2020  : 1950  [x]  Patient  Verified  Payor: Alyshashannan King / Plan: [address]  HMO / Product Type: Managed Care Medicare /    In time:300  Out time:350  Total Treatment Time (min): 50  Visit #: 1 of 8    Medicare/BCBS Only   Total Timed Codes (min):  40 1:1 Treatment Time:  40       Treatment Area: Low back pain [M54.5]    SUBJECTIVE  Pain Level (0-10 scale): 8/10  Any medication changes, allergies to medications, adverse drug reactions, diagnosis change, or new procedure performed?: [x] No    [] Yes (see summary sheet for update)  Subjective functional status/changes:   [] No changes reported  Pt stated that she is having a lot of trouble getting rides to get here and may have to stop therapy    OBJECTIVE    Modality rationale: decrease pain and increase tissue extensibility to improve the patients ability to increase ease with ADLS   Min Type Additional Details    [] Estim:  []Unatt       []IFC  []Premod                        []Other:  []w/ice   []w/heat  Position:  Location:    [] Estim: []Att    []TENS instruct  []NMES                    []Other:  []w/US   []w/ice   []w/heat  Position:  Location:    []  Traction: [] Cervical       []Lumbar                       [] Prone          []Supine                       []Intermittent   []Continuous Lbs:  [] before manual  [] after manual    []  Ultrasound: []Continuous   [] Pulsed                           []1MHz   []3MHz W/cm2:  Location:    []  Iontophoresis with dexamethasone         Location: [] Take home patch   [] In clinic   10 []  Ice     [x]  heat  []  Ice massage  []  Laser   []  Anodyne Position:seated  Location:low back    []  Laser with stim  []  Other:  Position:  Location:    []  Vasopneumatic Device Pressure:       [] lo [] med [] hi   Temperature: [] lo [] med [] hi   [x] Skin assessment post-treatment:  [x]intact []redness- no adverse reaction    []redness  adverse reaction:     40 min Therapeutic Exercise:  [x] See flow sheet :   Rationale: increase ROM and increase strength to improve the patients ability to increase ease with ADLS    With   [x] TE   [] TA   [] neuro   [] other: Patient Education: [x] Review HEP    [] Progressed/Changed HEP based on:   [] positioning   [] body mechanics   [] transfers   [] heat/ice application    [] other:      Other Objective/Functional Measures:   Had no difficulty with exercises  No complaint of increased low back pain during session  Standing exercises were challenging    Pain Level (0-10 scale) post treatment: 4/10    ASSESSMENT/Changes in Function:   Pt is making slow progress toward goals. Pt cont with significant low back pain. Cont to report difficulty with ADLs. Cont to use quad cane with ambulation    Patient will continue to benefit from skilled PT services to modify and progress therapeutic interventions, address functional mobility deficits, address ROM deficits, address strength deficits, analyze and cue movement patterns, analyze and modify body mechanics/ergonomics and instruct in home and community integration to attain remaining goals. []  See Plan of Care  [x]  See progress note/recertification  []  See Discharge Summary         Progress towards goals / Updated goals:  1. Pt to be compliant with initial HEP to improve patient's ability to independently improve hip strength/mobility and balance abilities  2. Pt to perform 5x STS test in 12 seconds or less to demo decreased risk for falls and improved functional LE strength. 3.  Pt to lift a 5 lb weight and carry it up 4 steps to facilitate improved ease with carrying groceries inside home after shopping. 4. Pt to report ambulation tolerance of at least 60 minutes with pain less than or equal to 2/10 at worst to demonstrate improved ability to perform grocery shopping tasks and community ambulation  5.  Pt to demonstrate SLS time of at least 10 seconds bilaterally to improve ease of community negotiation with improved safety. 6. Pt to report FOTO score of at least 62 pts to demonstrate improved function and quality of life.   Status at last note/certification: VINC 36 OJJ     PLAN  []  Upgrade activities as tolerated     [x]  Continue plan of care  []  Update interventions per flow sheet       []  Discharge due to:_  []  Other:_      Mendoza Pugh PTA 8/11/2020  8:02 AM    Future Appointments   Date Time Provider Bharat Thakkar   8/11/2020  3:00 PM Neftaly Grullon, PTA [facility] [address]   8/13/2020  3:00 PM Darrius Newman, PT [facility] [address]   8/18/2020  3:00 PM Shikhae Yadi, PTA [facility] [address]   8/20/2020  2:15 PM Belén Friday [facility] [address]   8/25/2020  3:00 PM Darrius Newman, PT [facility] [address]   8/27/2020  3:00 PM Shikhae Yadi, PTA [facility] [address]

## 2020-08-13 ENCOUNTER — HOSPITAL ENCOUNTER (OUTPATIENT)
Dept: PHYSICAL THERAPY | Age: 70
Discharge: HOME | End: 2020-08-13
Payer: MEDICARE

## 2020-08-13 PROCEDURE — 97110 THERAPEUTIC EXERCISES: CPT

## 2020-08-13 NOTE — PROGRESS NOTES
PT DAILY TREATMENT NOTE 10-18    Patient Name: Billy Campos [facility]  Date:2020  : 1950  [x]  Patient  Verified  Payor: BLUE CROSS MEDICARE / Plan: [address] HMO / Product Type: Managed Care Medicare /    In time:300  Out time:349  Total Treatment Time (min): 49  Visit #: 2 of 8    Medicare/BCBS Only   Total Timed Codes (min):  39 1:1 Treatment Time:  39       Treatment Area: Low back pain [M54.5]    SUBJECTIVE  Pain Level (0-10 scale): 8/10  Any medication changes, allergies to medications, adverse drug reactions, diagnosis change, or new procedure performed?: [x] No    [] Yes (see summary sheet for update)  Subjective functional status/changes:   [] No changes reported  Pt stated that her pain is still bad and gave it an 10 today    OBJECTIVE    Modality rationale: decrease pain and increase tissue extensibility to improve the patients ability to increase ease with ADLs   Min Type Additional Details    [] Estim:  []Unatt       []IFC  []Premod                        []Other:  []w/ice   []w/heat  Position:  Location:    [] Estim: []Att    []TENS instruct  []NMES                    []Other:  []w/US   []w/ice   []w/heat  Position:  Location:    []  Traction: [] Cervical       []Lumbar                       [] Prone          []Supine                       []Intermittent   []Continuous Lbs:  [] before manual  [] after manual    []  Ultrasound: []Continuous   [] Pulsed                           []1MHz   []3MHz W/cm2:  Location:    []  Iontophoresis with dexamethasone         Location: [] Take home patch   [] In clinic    []  Ice     []  heat  []  Ice massage  []  Laser   []  Anodyne Position:  Location:    []  Laser with stim  []  Other:  Position:  Location:    []  Vasopneumatic Device Pressure:       [] lo [] med [] hi   Temperature: [] lo [] med [] hi   [] Skin assessment post-treatment:  []intact []redness- no adverse reaction    []redness  adverse reaction:     39 min Therapeutic Exercise:  [x] See flow sheet :   Rationale: increase ROM and increase strength to improve the patients ability to increase ease with ADLs      With   [x] TE   [] TA   [] neuro   [] other: Patient Education: [x] Review HEP    [] Progressed/Changed HEP based on:   [] positioning   [] body mechanics   [] transfers   [] heat/ice application    [] other:      Other Objective/Functional Measures:   Had no difficulty with increases made today  No complaint of increased low back pain during session  No LOB with romberg on foam EO  Had moderate difficulty with tandem stance     Pain Level (0-10 scale) post treatment: 5/10    ASSESSMENT/Changes in Function:   Pt is making slow progress toward goals. Pt cont with significant pain in the low back. Cont to report difficulty with performing ADLs. Cont to report ascending stairs with groceries. Patient will continue to benefit from skilled PT services to modify and progress therapeutic interventions, address functional mobility deficits, address ROM deficits, address strength deficits, analyze and cue movement patterns, analyze and modify body mechanics/ergonomics, assess and modify postural abnormalities, address imbalance/dizziness and instruct in home and community integration to attain remaining goals. []  See Plan of Care  [x]  See progress note/recertification  []  See Discharge Summary         Progress towards goals / Updated goals:  1. Pt to be compliant with initial HEP to improve patient's ability to independently improve hip strength/mobility and balance abilities  2.  Pt to perform 5x STS test in 12 seconds or less to demo decreased risk for falls and improved functional LE strength. 3.  Pt to lift a 5 lb weight and carry it up 4 steps to facilitate improved ease with carrying groceries inside home after shopping.   4. Pt to report ambulation tolerance of at least 60 minutes with pain less than or equal to 2/10 at worst to demonstrate improved ability to perform grocery shopping tasks and community ambulation  5. Pt to demonstrate SLS time of at least 10 seconds bilaterally to improve ease of community negotiation with improved safety. 6. Pt to report FOTO score of at least 62 pts to demonstrate improved function and quality of life.   Status at last note/certification: ANXS 74 MTO     PLAN  []  Upgrade activities as tolerated     [x]  Continue plan of care  []  Update interventions per flow sheet       []  Discharge due to:_  []  Other:_      Paola Lawrence PTA 8/13/2020  10:02 AM    Future Appointments   Date Time Provider Bharat Thakkar   8/13/2020  3:00 PM Tracy Ba PTA [facility]   8/18/2020  3:00 PM Tracy Ba PTA [facility]   8/20/2020  2:15 PM Dejon MUNOZ [facility]   8/25/2020  3:00 PM Jarred Arcos, PT [facility]   8/27/2020  3:00 PM Tracy Ba, EMMA [facility]

## 2020-08-18 ENCOUNTER — HOSPITAL ENCOUNTER (OUTPATIENT)
Dept: PHYSICAL THERAPY | Age: 70
Discharge: HOME | End: 2020-08-18
Payer: MEDICARE

## 2020-08-18 PROCEDURE — 97110 THERAPEUTIC EXERCISES: CPT

## 2020-08-18 NOTE — PROGRESS NOTES
PT DAILY TREATMENT NOTE 10-18    Patient Name: Ajay Garcia [facility]  Date:2020  : 1950  [x]  Patient  Verified  Payor: BLUE CROSS MEDICARE / Plan: [address] [place] HMO / Product Type: Managed Care Medicare /    In time:255  Out time:343  Total Treatment Time (min): 48  Visit #: 3 of 8    Medicare/BCBS Only   Total Timed Codes (min):  38 1:1 Treatment Time:  38       Treatment Area: Low back pain [M54.5]    SUBJECTIVE  Pain Level (0-10 scale): 8/10  Any medication changes, allergies to medications, adverse drug reactions, diagnosis change, or new procedure performed?: [x] No    [] Yes (see summary sheet for update)  Subjective functional status/changes:   [] No changes reported  Pt stated that she is having some pain today.  Reported that she feels she is getting better, just has some bad days and others are better    OBJECTIVE    Modality rationale: decrease pain and increase tissue extensibility to improve the patients ability to increase ease with ADLs   Min Type Additional Details    [] Estim:  []Unatt       []IFC  []Premod                        []Other:  []w/ice   []w/heat  Position:  Location:    [] Estim: []Att    []TENS instruct  []NMES                    []Other:  []w/US   []w/ice   []w/heat  Position:  Location:    []  Traction: [] Cervical       []Lumbar                       [] Prone          []Supine                       []Intermittent   []Continuous Lbs:  [] before manual  [] after manual    []  Ultrasound: []Continuous   [] Pulsed                           []1MHz   []3MHz W/cm2:  Location:    []  Iontophoresis with dexamethasone         Location: [] Take home patch   [] In clinic    []  Ice     []  heat  []  Ice massage  []  Laser   []  Anodyne Position:  Location:    []  Laser with stim  []  Other:  Position:  Location:    []  Vasopneumatic Device Pressure:       [] lo [] med [] hi   Temperature: [] lo [] med [] hi   [] Skin assessment post-treatment:  []intact []redness- no adverse reaction    []redness  adverse reaction:     38 min Therapeutic Exercise:  [x] See flow sheet :   Rationale: increase ROM and increase strength to improve the patients ability to increase ease with ADLs    With   [x] TE   [] TA   [] neuro   [] other: Patient Education: [x] Review HEP    [] Progressed/Changed HEP based on:   [] positioning   [] body mechanics   [] transfers   [] heat/ice application    [] other:      Other Objective/Functional Measures:   Had slight LOB with Romberg on foam with EC  Some swaying with tandem stance bilaterally  No difficulty with 6\" step ups     Pain Level (0-10 scale) post treatment: 5/10    ASSESSMENT/Changes in Function:   Pt is slowly progressing toward goals. Pt cont with decreased activity tolerance. Static balance is improving. Cont to have some difficulty with steps    Patient will continue to benefit from skilled PT services to modify and progress therapeutic interventions, address functional mobility deficits, address ROM deficits, address strength deficits, analyze and cue movement patterns, analyze and modify body mechanics/ergonomics, assess and modify postural abnormalities and instruct in home and community integration to attain remaining goals. [x]  See Plan of Care  []  See progress note/recertification  []  See Discharge Summary         Progress towards goals / Updated goals:  1. Pt to be compliant with initial HEP to improve patient's ability to independently improve hip strength/mobility and balance abilities  2.  Pt to perform 5x STS test in 12 seconds or less to demo decreased risk for falls and improved functional LE strength. 3.  Pt to lift a 5 lb weight and carry it up 4 steps to facilitate improved ease with carrying groceries inside home after shopping. Progressing. 8/18/20  4.  Pt to report ambulation tolerance of at least 60 minutes with pain less than or equal to 2/10 at worst to demonstrate improved ability to perform grocery shopping tasks and community ambulation  Progressing. Pt reported less pain with walking. 8/18/20  5. Pt to demonstrate SLS time of at least 10 seconds bilaterally to improve ease of community negotiation with improved safety. 6. Pt to report FOTO score of at least 62 pts to demonstrate improved function and quality of life.   Status at last note/certification: TOXU 26 CGW     PLAN  []  Upgrade activities as tolerated     [x]  Continue plan of care  []  Update interventions per flow sheet       []  Discharge due to:_  []  Other:_      Paola Lawrence, EMMA 8/18/2020  7:41 AM    Future Appointments   Date Time Provider Bharat Thakkar   8/18/2020  3:00 PM May Capone [facility]   8/20/2020  2:15 PM Dejon DE LA FUENTE [facility]   8/25/2020  3:00 PM Abby Almaguer, PT [facility]   8/27/2020  3:00 PM Tracy Ba PTA [facility]

## 2020-08-20 ENCOUNTER — HOSPITAL ENCOUNTER (OUTPATIENT)
Dept: PHYSICAL THERAPY | Age: 70
Discharge: HOME | End: 2020-08-20
Payer: MEDICARE

## 2020-08-20 PROCEDURE — 97530 THERAPEUTIC ACTIVITIES: CPT

## 2020-08-20 PROCEDURE — 97110 THERAPEUTIC EXERCISES: CPT

## 2020-08-20 PROCEDURE — 97112 NEUROMUSCULAR REEDUCATION: CPT

## 2020-08-20 NOTE — PROGRESS NOTES
PT DAILY TREATMENT NOTE 10-18    Patient Name: Wayne Prasad  Date:2020  : 1950  [x]  Patient  Verified  Payor: BLUE CROSS MEDICARE / Plan: [facility] HMO / Product Type: Managed Care Medicare /    In time: 2:15  Out time: 3:13  Total Treatment Time (min): 58  Visit #: 4 of 8    Medicare/BCBS Only   Total Timed Codes (min):  48 1:1 Treatment Time:  48       Treatment Area: Low back pain [M54.5]    SUBJECTIVE  Pain Level (0-10 scale): 6  Any medication changes, allergies to medications, adverse drug reactions, diagnosis change, or new procedure performed?: [x] No    [] Yes (see summary sheet for update)  Subjective functional status/changes:   [] No changes reported  \"I used to go walking every day, and I just miss doing that, I'll be 70 in a few days and I used to be so much more active. \"    OBJECTIVE     Modality rationale: decrease pain and increase tissue extensibility to improve the patients ability to  tolerate exercises and return to daily activity with ease.    Min Type Additional Details    [] Estim:  []Unatt       []IFC  []Premod                        []Other:  []w/ice   []w/heat  Position:  Location:    [] Estim: []Att    []TENS instruct  []NMES                    []Other:  []w/US   []w/ice   []w/heat  Position:  Location:    []  Traction: [] Cervical       []Lumbar                       [] Prone          []Supine                       []Intermittent   []Continuous Lbs:  [] before manual  [] after manual    []  Ultrasound: []Continuous   [] Pulsed                           []1MHz   []3MHz W/cm2:  Location:    []  Iontophoresis with dexamethasone         Location: [] Take home patch   [] In clinic   10 []  Ice     [x]  heat  []  Ice massage  []  Laser   []  Anodyne Position: seated  Location: low back    []  Laser with stim  []  Other:  Position:  Location:    []  Vasopneumatic Device Pressure:       [] lo [] med [] hi   Temperature: [] lo [] med [] hi   [] Skin assessment post-treatment:  []intact []redness- no adverse reaction    []redness  adverse reaction:     11 min Therapeutic Exercise:  [x] See flow sheet :   Rationale: increase ROM and increase strength to improve the patients ability to perform ADLs with ease    15 min Therapeutic Activity:  []  See flow sheet :   Rationale: increase ROM, increase strength, improve coordination, improve balance and increase proprioception  to improve the patients ability to  perform daily tasks and return to PLOF    22 min Neuromuscular Re-education:  [x]  See flow sheet :   Rationale: improve coordination, improve balance and increase proprioception  to improve the patients ability to stabilize, use proper body mechanics, and promote proper postural awareness    With   [x] TE   [x] TA   [x] neuro   [] other: Patient Education: [x] Review HEP    [] Progressed/Changed HEP based on:   [x] positioning   [x] body mechanics   [] transfers   [] heat/ice application    [] other:      Other Objective/Functional Measures:   Functional Gains: \"standing longer, usually when I'm doing things; I am able to shop. \"  Functional Deficits: being able to carry the groceries in without getting pain  % improvement: 40%  Pain   Average: 6/10       Best: 4/10     Worst: 8/10  Patient Goal: \"I want to get rid of the pain or as best as I can so I can get back to walking for exercise. \"     Pain Level (0-10 scale) post treatment: 5/10    ASSESSMENT/Changes in Function: Patient has been consistent with attending PT sessions and is slowly progressing towards goals. She continues to demonstrate impaired balance in single leg WB; sit<>stand transfer progressing, however requires extra time to complete. Reminded pt of the importance of HEP is to her progression. She will benefit from continued skilled therapy for balance and stability, as well as core and LE strengthening to decrease low back pain.      Patient will continue to benefit from skilled PT services to modify and progress therapeutic interventions, address functional mobility deficits, address ROM deficits, address strength deficits, analyze and address soft tissue restrictions, analyze and cue movement patterns, analyze and modify body mechanics/ergonomics and assess and modify postural abnormalities to attain remaining goals. []  See Plan of Care  [x]  See progress note/recertification  []  See Discharge Summary         Progress towards goals / Updated goals:  1. Pt to be compliant with initial HEP to improve patient's ability to independently improve hip strength/mobility and balance abilities       Current: Progressing pt reports some compliance, sometimes she just doesn't get to it. 8/20/20  2.  Pt to perform 5x STS test in 12 seconds or less to demo decreased risk for falls and improved functional LE strength. Current: Progressing; 4x in 12 seconds 8/20/20  3.  Pt to lift a 5 lb weight and carry it up 4 steps to facilitate improved ease with carrying groceries inside home after shopping. Current: Progressing. 8/18/20  4. Pt to report ambulation tolerance of at least 60 minutes with pain less than or equal to 2/10 at worst to demonstrate improved ability to perform grocery shopping tasks and community ambulation      Current: Progressing. Pt reported less pain with walking. 8/18/20  5. Pt to demonstrate SLS time of at least 10 seconds bilaterally to improve ease of community negotiation with improved safety. Current: Not met; unable to maintain balance bilaterally  6. Pt to report FOTO score of at least 62 pts to demonstrate improved function and quality of life.   Status at last note/certification: XHZX 27 ZGW     Current: Not met; score 47 points 8/20/20    PLAN  [x]  Upgrade activities as tolerated     [x]  Continue plan of care  []  Update interventions per flow sheet       []  Discharge due to:_  []  Other:_      ABHIJEET Parnell 8/20/2020  2:17 PM    Future Appointments   Date Time Provider Bharat Ariane   8/25/2020  3:00 PM Mary Jo Juarez, PT [facility]   8/27/2020  3:00 PM Zainab Leo, PTA [facility]

## 2020-08-25 ENCOUNTER — APPOINTMENT (OUTPATIENT)
Dept: PHYSICAL THERAPY | Age: 70
End: 2020-08-25
Payer: MEDICARE

## 2020-08-25 NOTE — PROGRESS NOTES
[facility]  [facility] [employer] [facility] MARIANN  [address] [place] [address]  [phone] fax    Continued Plan of Care/ Re-certification for Physical Therapy Services    Patient name: Solange Tyler Start of Care: 20   Referral source: Julia Bernard NP : 1950   Medical/Treatment Diagnosis: Low back pain [M54.5]  Payor: Sofia Ascencio / Plan: [facility] HMO / Product Type: Managed Care Medicare /  Onset Date:20     Prior Hospitalization: see medical history Provider#: [id number]   Medications: Verified on Patient Summary List    Comorbidities: depression, high cholesterol, arthritis, HTN, left knee pain   Prior Level of Function: using [facility] occasionally, 3 steps to enter home with B handrail; functionally independent     Visits from Start of Care: 6   Missed Visits: 0    The Plan of Care and following information is based on the patient's current status:  Goal: Pt to be compliant with initial HEP to improve patient's ability to independently improve hip strength/mobility and balance abilities  Status at last note/certification: Current: Progressing pt reports some compliance, sometimes she just doesn't get to it  Current Status: not met    Goal: Pt to perform 5x STS test in 12 seconds or less to demo decreased risk for falls and improved functional LE strength. Status at last note/certification: Current: Progressing; 4x in 12 seconds  Current Status: not met    Goal: Pt to lift a 5 lb weight and carry it up 4 steps to facilitate improved ease with carrying groceries inside home after shopping. Status at last note/certification:Progressing  Current Status: not met    Goal: Pt to report ambulation tolerance of at least 60 minutes with pain less than or equal to 2/10 at worst to demonstrate improved ability to perform grocery shopping tasks and community ambulation  Status at last note/certification: Progressing.  Pt reported less pain with walking  Current Status: not met    Goal:  Pt to demonstrate SLS time of at least 10 seconds bilaterally to improve ease of community negotiation with improved safety. Status at last note/certification: Not met; unable to maintain balance bilaterally  Current Status: not met    Goal: Pt to report FOTO score of at least 62 pts to demonstrate improved function and quality of life. Status at last note/certification: Not met. FOTO 47/100. No change since eval.  Improved 5 points since last assessed   Current Status: not met      Key functional changes: improving FOTO score since last assessed,     Problems/ barriers to goal attainment: limited insurance visits, inconsistent HEP compliance, sedentary lifestyle      Problem List: pain affecting function, decrease ROM, decrease strength, impaired gait/ balance, decrease ADL/ functional abilitiies, decrease activity tolerance, decrease flexibility/ joint mobility and decrease transfer abilities    Treatment Plan: Therapeutic exercise, Therapeutic activities, Neuromuscular re-education, Physical agent/modality, Gait/balance training, Manual therapy, Patient education, Self Care training, Functional mobility training, Home safety training and Stair training     Patient Goal (s) has been updated and includes: less pain, walk more      Goals for this certification period to be accomplished in 4 weeks:  1. Pt to be compliant with initial HEP to improve patient's ability to independently improve hip strength/mobility and balance abilities  2. Pt to perform 5x STS test in 12 seconds or less to demo decreased risk for falls and improved functional LE strength. 3.  Pt to lift a 5 lb weight and carry it up 4 steps to facilitate improved ease with carrying groceries inside home after shopping.   4. Pt to report ambulation tolerance of at least 60 minutes with pain less than or equal to 2/10 at worst to demonstrate improved ability to perform grocery shopping tasks and community ambulation  5. Pt to demonstrate SLS time of at least 10 seconds bilaterally to improve ease of community negotiation with improved safety. 6. Pt to report FOTO score of at least 62 pts to demonstrate improved function and quality of life. Frequency / Duration: Patient to be seen 2-3 times per week for 4 weeks:    Assessment / Recommendations: Pt is making slow progress in therapy, with further progress limited by decreased insurance visits as pt has only attended 4 sessions since last recert as well as inconsistent HEP compliance. She reports reducing pain levels and increasing ambulatory tolerance. SLS continues to be impaired. FOTO score  has increased 5 points since last assessed, indicative of functional improvement. Pt will benefit from continued skilled PT to address remaining strength, ROM, flexibility, and balance deficits in order to improve ease/safety with ambulation and daily tasks and improve QOL. Certification Period: 8/26/20 to 9/24/20    Monie Le, PT 8/25/2020 5:31 PM    ________________________________________________________________________  I certify that the above Therapy Services are being furnished while the patient is under my care. I agree with the treatment plan and certify that this therapy is necessary. [] I have read the above and request that my patient continue as recommended.   [] I have read the above report and request that my patient continue therapy with the following changes/special instructions: _______________________________________  [] I have read the above report and request that my patient be discharged from therapy    Physician's Signature:____________Date:_________TIME:________    ** Signature, Date and Time must be completed for valid certification **    Please sign and return to [facility]  [facility]  MARIANN  [address] [place] [address]  [phone] fax

## 2020-08-27 ENCOUNTER — HOSPITAL ENCOUNTER (OUTPATIENT)
Dept: PHYSICAL THERAPY | Age: 70
Discharge: HOME | End: 2020-08-27
Payer: MEDICARE

## 2020-08-27 PROCEDURE — 97110 THERAPEUTIC EXERCISES: CPT

## 2020-08-27 PROCEDURE — 97112 NEUROMUSCULAR REEDUCATION: CPT

## 2020-08-27 NOTE — PROGRESS NOTES
PT DAILY TREATMENT NOTE 10-18    Patient Name: Chana Mendoza [facility]  Date:2020  : 1950  [x]  Patient  Verified  Payor: BLUE CROSS MEDICARE / Plan: [facility] HMO / Product Type: Managed Care Medicare /    In time:300  Out time:348  Total Treatment Time (min): 48  Visit #: 2 of     Medicare/BCBS Only   Total Timed Codes (min):  38 1:1 Treatment Time:  38       Treatment Area: Low back pain [M54.5]    SUBJECTIVE  Pain Level (0-10 scale): 8/10  Any medication changes, allergies to medications, adverse drug reactions, diagnosis change, or new procedure performed?: [x] No    [] Yes (see summary sheet for update)  Subjective functional status/changes:   [] No changes reported  Pt stated that her pain was really bad this morning, but it is a little better now.  Thinks her mattress is bad     OBJECTIVE    Modality rationale: decrease pain and increase tissue extensibility to improve the patients ability to increase ease with ADLs   Min Type Additional Details    [] Estim:  []Unatt       []IFC  []Premod                        []Other:  []w/ice   []w/heat  Position:  Location:    [] Estim: []Att    []TENS instruct  []NMES                    []Other:  []w/US   []w/ice   []w/heat  Position:  Location:    []  Traction: [] Cervical       []Lumbar                       [] Prone          []Supine                       []Intermittent   []Continuous Lbs:  [] before manual  [] after manual    []  Ultrasound: []Continuous   [] Pulsed                           []1MHz   []3MHz W/cm2:  Location:    []  Iontophoresis with dexamethasone         Location: [] Take home patch   [] In clinic   10 []  Ice     [x]  heat  []  Ice massage  []  Laser   []  Anodyne Position: seated  Location:low back    []  Laser with stim  []  Other:  Position:  Location:    []  Vasopneumatic Device Pressure:       [] lo [] med [] hi   Temperature: [] lo [] med [] hi   [x] Skin assessment post-treatment:  [x]intact []redness- no adverse reaction    []redness  adverse reaction:     30 min Therapeutic Exercise:  [x] See flow sheet :   Rationale: increase ROM and increase strength to improve the patients ability to increase ease with ADLs    8 min Neuromuscular Re-education:  [x]  See flow sheet :   Rationale: increase strength, improve coordination and improve balance  to improve the patients ability to decrease fall risk    With   [x] TE   [] TA   [] neuro   [] other: Patient Education: [x] Review HEP    [] Progressed/Changed HEP based on:   [] positioning   [] body mechanics   [] transfers   [] heat/ice application    [] other:      Other Objective/Functional Measures:   Had no difficulty with exercises  No complaint of increased low back pain during session   Tolerated 2# weights without difficulty     Pain Level (0-10 scale) post treatment: 6/10    ASSESSMENT/Changes in Function:   Pt is making limited progress toward goals. Pt cont with significant low back pain that fluctuates. Cont to have difficulty with ADLs. Pt reported going to the mall and did not bring her cane with her and had no difficulty    Patient will continue to benefit from skilled PT services to modify and progress therapeutic interventions, address functional mobility deficits, address ROM deficits, address strength deficits, analyze and cue movement patterns, analyze and modify body mechanics/ergonomics, assess and modify postural abnormalities, address imbalance/dizziness and instruct in home and community integration to attain remaining goals. []  See Plan of Care  [x]  See progress note/recertification  []  See Discharge Summary         Progress towards goals / Updated goals:  1. Pt to be compliant with initial HEP to improve patient's ability to independently improve hip strength/mobility and balance abilities  2.  Pt to perform 5x STS test in 12 seconds or less to demo decreased risk for falls and improved functional LE strength.   3.  Pt to lift a 5 lb weight and carry it up 4 steps to facilitate improved ease with carrying groceries inside home after shopping. 4. Pt to report ambulation tolerance of at least 60 minutes with pain less than or equal to 2/10 at worst to demonstrate improved ability to perform grocery shopping tasks and community ambulation  5. Pt to demonstrate SLS time of at least 10 seconds bilaterally to improve ease of community negotiation with improved safety. 6. Pt to report FOTO score of at least 62 pts to demonstrate improved function and quality of life.     PLAN  []  Upgrade activities as tolerated     [x]  Continue plan of care  []  Update interventions per flow sheet       []  Discharge due to:_  []  Other:_      Melany Sahni PTA 8/27/2020  9:57 AM    Future Appointments   Date Time Provider Bharat Thakkar   8/27/2020  3:00 PM Liberty Knott PTA [facility]

## 2020-09-03 ENCOUNTER — HOSPITAL ENCOUNTER (OUTPATIENT)
Dept: PHYSICAL THERAPY | Age: 70
Discharge: HOME | End: 2020-09-03
Payer: MEDICARE

## 2020-09-03 PROCEDURE — 97110 THERAPEUTIC EXERCISES: CPT

## 2020-09-03 PROCEDURE — 97112 NEUROMUSCULAR REEDUCATION: CPT

## 2020-09-03 NOTE — PROGRESS NOTES
PT DAILY TREATMENT NOTE 10-18    Patient Name: Dominique Parks  Date:9/3/2020  : 1950  [x]  Patient  Verified  Payor: BLUE CROSS MEDICARE / Plan: [facility]  HMO / Product Type: Managed Care Medicare /    In time:300  Out time:348  Total Treatment Time (min): 48  Visit #: 2 of     Medicare/BCBS Only   Total Timed Codes (min):  38 1:1 Treatment Time:  38       Treatment Area: Low back pain [M54.5]    SUBJECTIVE  Pain Level (0-10 scale): 7/10  Any medication changes, allergies to medications, adverse drug reactions, diagnosis change, or new procedure performed?: [x] No    [] Yes (see summary sheet for update)  Subjective functional status/changes:   [] No changes reported  Pt stated that she is doing ok, but still has pain    OBJECTIVE    Modality rationale: decrease pain and increase tissue extensibility to improve the patients ability to increase ease with ADLs   Min Type Additional Details    [] Estim:  []Unatt       []IFC  []Premod                        []Other:  []w/ice   []w/heat  Position:  Location:    [] Estim: []Att    []TENS instruct  []NMES                    []Other:  []w/US   []w/ice   []w/heat  Position:  Location:    []  Traction: [] Cervical       []Lumbar                       [] Prone          []Supine                       []Intermittent   []Continuous Lbs:  [] before manual  [] after manual    []  Ultrasound: []Continuous   [] Pulsed                           []1MHz   []3MHz W/cm2:  Location:    []  Iontophoresis with dexamethasone         Location: [] Take home patch   [] In clinic   10 []  Ice     [x]  heat  []  Ice massage  []  Laser   []  Anodyne Position: seated  Location:low back    []  Laser with stim  []  Other:  Position:  Location:    []  Vasopneumatic Device Pressure:       [] lo [] med [] hi   Temperature: [] lo [] med [] hi   [x] Skin assessment post-treatment:  [x]intact []redness- no adverse reaction    []redness  adverse reaction: 30 min Therapeutic Exercise:  [x] See flow sheet :   Rationale: increase ROM and increase strength to improve the patients ability to increase ease with ADLs    8 min Neuromuscular Re-education:  [x]  See flow sheet :   Rationale: increase strength, improve coordination and improve balance  to improve the patients ability to decrease fall risk    With   [x] TE   [] TA   [] neuro   [] other: Patient Education: [x] Review HEP    [] Progressed/Changed HEP based on:   [] positioning   [] body mechanics   [] transfers   [] heat/ice application    [] other:      Other Objective/Functional Measures:   Had significant difficulty with SLS bilaterally   No difficulty with most exercises  Program cont to be challenging    Pain Level (0-10 scale) post treatment: 3/10    ASSESSMENT/Changes in Function:   Pt is progressing well toward goals. Pt cont with slight decrease in static balance. Cont to have some difficulty with carrying groceries up the steps. Sit to stands are improving. Ambulation tolerance is slowly improving    Patient will continue to benefit from skilled PT services to modify and progress therapeutic interventions, address functional mobility deficits, address ROM deficits, address strength deficits, analyze and cue movement patterns, analyze and modify body mechanics/ergonomics, address imbalance/dizziness and instruct in home and community integration to attain remaining goals. []  See Plan of Care  [x]  See progress note/recertification  []  See Discharge Summary         Progress towards goals / Updated goals:  1. Pt to be compliant with initial HEP to improve patient's ability to independently improve hip strength/mobility and balance abilities  2.  Pt to perform 5x STS test in 12 seconds or less to demo decreased risk for falls and improved functional LE strength.   3.  Pt to lift a 5 lb weight and carry it up 4 steps to facilitate improved ease with carrying groceries inside home after shopping. 4. Pt to report ambulation tolerance of at least 60 minutes with pain less than or equal to 2/10 at worst to demonstrate improved ability to perform grocery shopping tasks and community ambulation  5. Pt to demonstrate SLS time of at least 10 seconds bilaterally to improve ease of community negotiation with improved safety. Not met. SLS time bilaterally is 1-2 seconds. 9/3/20  6. Pt to report FOTO score of at least 62 pts to demonstrate improved function and quality of life.     PLAN  []  Upgrade activities as tolerated     [x]  Continue plan of care  []  Update interventions per flow sheet       []  Discharge due to:_  []  Other:_      Pat Kitchen PTA 9/3/2020  8:27 AM    Future Appointments   Date Time Provider Bharat Thakkar   9/3/2020  3:00 PM Andres Christensen [facility]   9/9/2020  2:15 PM Phylliss CoastEMMA [facility]   9/11/2020  2:15 PM Phylliss Coast PTA [facility]

## 2020-09-09 ENCOUNTER — HOSPITAL ENCOUNTER (OUTPATIENT)
Dept: PHYSICAL THERAPY | Age: 70
Discharge: HOME | End: 2020-09-09
Payer: MEDICARE

## 2020-09-09 PROCEDURE — 97112 NEUROMUSCULAR REEDUCATION: CPT

## 2020-09-09 PROCEDURE — 97110 THERAPEUTIC EXERCISES: CPT

## 2020-09-09 NOTE — PROGRESS NOTES
PT DAILY TREATMENT NOTE 10-18    Patient Name: Dominique Parks  Date:2020  : 1950  [x]  Patient  Verified  Payor: BLUE CROSS MEDICARE / Plan: [facility] [place] [place]  HMO / Product Type: Managed Care Medicare /    In time:215  Out time:303  Total Treatment Time (min): 48  Visit #: 3 of     Medicare/BCBS Only   Total Timed Codes (min):  38 1:1 Treatment Time:  38       Treatment Area: Low back pain [M54.5]    SUBJECTIVE  Pain Level (0-10 scale): 6/10  Any medication changes, allergies to medications, adverse drug reactions, diagnosis change, or new procedure performed?: [x] No    [] Yes (see summary sheet for update)  Subjective functional status/changes:   [] No changes reported  Pt stated that she is about the same and has 6/10 pain today    OBJECTIVE    Modality rationale: decrease pain and increase tissue extensibility to improve the patients ability to increase ease with ADLs   Min Type Additional Details    [] Estim:  []Unatt       []IFC  []Premod                        []Other:  []w/ice   []w/heat  Position:  Location:    [] Estim: []Att    []TENS instruct  []NMES                    []Other:  []w/US   []w/ice   []w/heat  Position:  Location:    []  Traction: [] Cervical       []Lumbar                       [] Prone          []Supine                       []Intermittent   []Continuous Lbs:  [] before manual  [] after manual    []  Ultrasound: []Continuous   [] Pulsed                           []1MHz   []3MHz W/cm2:  Location:    []  Iontophoresis with dexamethasone         Location: [] Take home patch   [] In clinic   10 []  Ice     [x]  heat  []  Ice massage  []  Laser   []  Anodyne Position: seated  Location:low back    []  Laser with stim  []  Other:  Position:  Location:    []  Vasopneumatic Device Pressure:       [] lo [] med [] hi   Temperature: [] lo [] med [] hi   [x] Skin assessment post-treatment:  [x]intact []redness- no adverse reaction    []redness  adverse reaction:     30 min Therapeutic Exercise:  [x] See flow sheet :   Rationale: increase ROM and increase strength to improve the patients ability to increase ease with ADls    8 min Neuromuscular Re-education:  [x]  See flow sheet :   Rationale: increase strength, improve coordination and improve balance  to improve the patients ability to decrease fall risk    With   [x] TE   [] TA   [] neuro   [] other: Patient Education: [x] Review HEP    [x] Progressed/Changed HEP based on:   [] positioning   [] body mechanics   [] transfers   [] heat/ice application    [] other:      Other Objective/Functional Measures:   Had no difficulty with exercises  No complaint of increased pain during session   Pt was issued final HEP and showed understanding of all exercises     Pain Level (0-10 scale) post treatment: 0/10    ASSESSMENT/Changes in Function:   Pt is making limited progress toward goals and is to be discharged next visit    Patient will continue to benefit from skilled PT services to modify and progress therapeutic interventions, address functional mobility deficits, address ROM deficits, address strength deficits, analyze and cue movement patterns, assess and modify postural abnormalities, address imbalance/dizziness and instruct in home and community integration to attain remaining goals. []  See Plan of Care  [x]  See progress note/recertification  []  See Discharge Summary         Progress towards goals / Updated goals:  1. Pt to be compliant with initial HEP to improve patient's ability to independently improve hip strength/mobility and balance abilities  Pt reports partial compliance with HEP  2.  Pt to perform 5x STS test in 12 seconds or less to demo decreased risk for falls and improved functional LE strength. Progressing. Pt was able to perform 5 sit to stands in 14 seconds.   3.  Pt to lift a 5 lb weight and carry it up 4 steps to facilitate improved ease with carrying groceries inside home after shopping. Progressing. Pt reports less difficulty with ascending/descending steps in front of her house if the grocery bag is not too heavy  4. Pt to report ambulation tolerance of at least 60 minutes with pain less than or equal to 2/10 at worst to demonstrate improved ability to perform grocery shopping tasks and community ambulation  Progressing. Pt reported ability to walk for about an hour, but cont with 6/10 pain  5. Pt to demonstrate SLS time of at least 10 seconds bilaterally to improve ease of community negotiation with improved safety. Not met. SLS time bilaterally is 1-2 seconds. 9/3/20, no change 9/9/20  6. Pt to report FOTO score of at least 62 pts to demonstrate improved function and quality of life.     PLAN  []  Upgrade activities as tolerated     []  Continue plan of care  []  Update interventions per flow sheet       [x]  Discharge next visit  []  Other:_      Gris Toribio PTA 9/9/2020  9:12 AM    Future Appointments   Date Time Provider Bharat Thakkar   9/9/2020  2:15 PM Lori Payton PTA [facility]   9/11/2020  2:15 PM Lori Payton PTA [facility]

## 2020-09-11 ENCOUNTER — HOSPITAL ENCOUNTER (OUTPATIENT)
Dept: PHYSICAL THERAPY | Age: 70
Discharge: HOME | End: 2020-09-11
Payer: MEDICARE

## 2020-09-11 PROCEDURE — 97110 THERAPEUTIC EXERCISES: CPT

## 2020-09-11 NOTE — PROGRESS NOTES
PT DAILY TREATMENT NOTE 10-18    Patient Name: Katherina Sicard  Date:2020  : 1950  [x]  Patient  Verified  Payor: BLUE CROSS MEDICARE / Plan: [facility]  HMO / Product Type: Managed Care Medicare /    In time:215  Out time:303  Total Treatment Time (min): 48  Visit #: 4 of     Medicare/BCBS Only   Total Timed Codes (min):  38 1:1 Treatment Time:  38       Treatment Area: Low back pain [M54.5]    SUBJECTIVE  Pain Level (0-10 scale): 10  Any medication changes, allergies to medications, adverse drug reactions, diagnosis change, or new procedure performed?: [x] No    [] Yes (see summary sheet for update)  Subjective functional status/changes:   [] No changes reported  Pt stated that she is doing good today    OBJECTIVE    Modality rationale: decrease pain and increase tissue extensibility to improve the patients ability to increase ease with ADLs   Min Type Additional Details    [] Estim:  []Unatt       []IFC  []Premod                        []Other:  []w/ice   []w/heat  Position:  Location:    [] Estim: []Att    []TENS instruct  []NMES                    []Other:  []w/US   []w/ice   []w/heat  Position:  Location:    []  Traction: [] Cervical       []Lumbar                       [] Prone          []Supine                       []Intermittent   []Continuous Lbs:  [] before manual  [] after manual    []  Ultrasound: []Continuous   [] Pulsed                           []1MHz   []3MHz W/cm2:  Location:    []  Iontophoresis with dexamethasone         Location: [] Take home patch   [] In clinic   10 []  Ice     [x]  heat  []  Ice massage  []  Laser   []  Anodyne Position:seated  Location:low back    []  Laser with stim  []  Other:  Position:  Location:    []  Vasopneumatic Device Pressure:       [] lo [] med [] hi   Temperature: [] lo [] med [] hi   [x] Skin assessment post-treatment:  [x]intact []redness- no adverse reaction    []redness  adverse reaction:     38 min Therapeutic Exercise:  [x] See flow sheet :   Rationale: increase ROM and increase strength to improve the patients ability to increase ease with ADLs    With   [x] TE   [] TA   [] neuro   [] other: Patient Education: [x] Review HEP    [] Progressed/Changed HEP based on:   [] positioning   [] body mechanics   [] transfers   [] heat/ice application    [] other:      Other Objective/Functional Measures:   Reviewed HEP   FOTO 58    Pain Level (0-10 scale) post treatment: 0/10    ASSESSMENT/Changes in Function:   []  See Plan of Care  []  See progress note/recertification  [x]  See Discharge Summary         Progress towards goals / Updated goals:  1. Pt to be compliant with initial HEP to improve patient's ability to independently improve hip strength/mobility and balance abilities  Pt reports partial compliance with HEP  2.  Pt to perform 5x STS test in 12 seconds or less to demo decreased risk for falls and improved functional LE strength. Progressing. Pt was able to perform 5 sit to stands in 14 seconds. 3.  Pt to lift a 5 lb weight and carry it up 4 steps to facilitate improved ease with carrying groceries inside home after shopping. Progressing. Pt reports less difficulty with ascending/descending steps in front of her house if the grocery bag is not too heavy  4. Pt to report ambulation tolerance of at least 60 minutes with pain less than or equal to 2/10 at worst to demonstrate improved ability to perform grocery shopping tasks and community ambulation  Progressing. Pt reported ability to walk for about an hour, but cont with 6/10 pain  5. Pt to demonstrate SLS time of at least 10 seconds bilaterally to improve ease of community negotiation with improved safety. Not met. SLS time bilaterally is 1-2 seconds. 9/3/20, no change 9/9/20  6. Pt to report FOTO score of at least 62 pts to demonstrate improved function and quality of life. Progressing.  Increased to 58    PLAN  []  Upgrade activities as tolerated []  Continue plan of care  []  Update interventions per flow sheet       [x]  Discharge   []  Other:_      Inder Saab PTA 9/11/2020  6:52 AM    Future Appointments   Date Time Provider Bharat Thakkar   9/11/2020  2:15 PM Ron Cohen PTA [facility]

## 2020-09-11 NOTE — PROGRESS NOTES
[facility] Margie Shah  [address] [facility]  [phone] fax    Physical Therapy Discharge Summary    Patient name: Junior Yancey Start of Care: 20   Referral source: Feliz Gibbons NP : 1950   Medical/Treatment Diagnosis: Low back pain [M54.5]  Payor: BLUE CROSS MEDICARE / Plan: [facility] [address] HMO / Product Type: Managed Care Medicare /  Onset Date:20     Prior Hospitalization: see medical history Provider#: [id number]   Medications: Verified on Patient Summary List    Comorbidities: depression, high cholesterol, arthritis, HTN, left knee pain   Prior Level of Function: using [facility] occasionally, 3 steps to enter home with B handrail; functionally independent     Visits from Start of Care: 10    Missed Visits: 1    Reporting Period : 20 to 20    Summary of Care:  Goal: Pt to be compliant with initial HEP to improve patient's ability to independently improve hip strength/mobility and balance abilities  Status at last note/certification: Pt reports partial compliance with HEP  Status at discharge: not met    Goal: Pt to perform 5x STS test in 12 seconds or less to demo decreased risk for falls and improved functional LE strength  Status at last note/certification: Progressing. Pt was able to perform 5 sit to stands in 14 seconds. Status at discharge: not met    Goal: Pt to lift a 5 lb weight and carry it up 4 steps to facilitate improved ease with carrying groceries inside home after shopping  Status at last note/certification: Progressing.  Pt reports less difficulty with ascending/descending steps in front of her house if the grocery bag is not too heavy  Status at discharge: not met    Goal: Pt to report ambulation tolerance of at least 60 minutes with pain less than or equal to 2/10 at worst to demonstrate improved ability to perform grocery shopping tasks and community ambulation  Status at last note/certification: Progressing. Pt reported ability to walk for about an hour, but continues with 6/10 pain  Status at discharge: not met    Goal: Pt to demonstrate SLS time of at least 10 seconds bilaterally to improve ease of community negotiation with improved safety. Status at last note/certification: Not met. SLS time bilaterally is 1-2 seconds. Status at discharge: not met    Goal: Pt to report FOTO score of at least 62 pts to demonstrate improved function and quality of life. Status at last note/certification: Progressing. Increased to 58  Status at discharge: not met      ASSESSMENT/RECOMMENDATIONS:  Pt has made slow progress in therapy, progressing towards 5/6 updated goals. SLS continues to be impaired B. She reports improving ability with carrying and improving ambulatory tolerance, and increased FOTO score is indicative of functional improvement. Further progress is limited by pt's partial compliance with HEP. Pt was given updated HEP to address remaining deficits independently. DC at this time d/t recent plateau in progress.     [x]Discontinue therapy: []Patient has reached or is progressing toward set goals      []Patient is non-compliant or has abdicated      [x]Due to lack of appreciable progress towards set goals    Mell Amin, PT 9/11/2020 4:33 PM

## 2021-05-28 NOTE — PROGRESS NOTES
PT DAILY TREATMENT NOTE 10-18    Patient Name: Erlinda Caldera [facility]  Date:3/17/2020  : 1950  [x]  Patient  Verified  Payor: Jin Castorena / Plan: [address] [place] [address] HMO / Product Type: Managed Care Medicare /    In time:300  Out time:345  Total Treatment Time (min): 45  Visit #: 3 of 24    Medicare/BCBS Only   Total Timed Codes (min):  35 1:1 Treatment Time:  35       Treatment Area: Low back pain [M54.5]    SUBJECTIVE  Pain Level (0-10 scale): 6/10  Any medication changes, allergies to medications, adverse drug reactions, diagnosis change, or new procedure performed?: [x] No    [] Yes (see summary sheet for update)  Subjective functional status/changes:   [] No changes reported  Pt stated that she is having the same pain in her low back    OBJECTIVE    Modality rationale: decrease pain and increase tissue extensibility to improve the patients ability to increase ease with ADLs   Min Type Additional Details    [] Estim:  []Unatt       []IFC  []Premod                        []Other:  []w/ice   []w/heat  Position:  Location:    [] Estim: []Att    []TENS instruct  []NMES                    []Other:  []w/US   []w/ice   []w/heat  Position:  Location:    []  Traction: [] Cervical       []Lumbar                       [] Prone          []Supine                       []Intermittent   []Continuous Lbs:  [] before manual  [] after manual    []  Ultrasound: []Continuous   [] Pulsed                           []1MHz   []3MHz W/cm2:  Location:    []  Iontophoresis with dexamethasone         Location: [] Take home patch   [] In clinic   10 []  Ice     [x]  heat  []  Ice massage  []  Laser   []  Anodyne Position:seated  Location:low back    []  Laser with stim  []  Other:  Position:  Location:    []  Vasopneumatic Device Pressure:       [] lo [] med [] hi   Temperature: [] lo [] med [] hi   [x] Skin assessment post-treatment:  [x]intact []redness- no adverse reaction    []redness  adverse reaction:     35 Patient refusing to have NG tube replaced stating \" I already have all this stuff hooked up to me, I'm not about to do that with this oxygen in my nose this already is uncomfortable. \" RN placed to Dr. Lowell Kwan, message left for on call Dr. Migue Barahona. min Therapeutic Exercise:  [x] See flow sheet :   Rationale: increase ROM and increase strength to improve the patients ability to increase ease with ADLs    With   [x] TE   [] TA   [] neuro   [] other: Patient Education: [x] Review HEP    [] Progressed/Changed HEP based on:   [] positioning   [] body mechanics   [] transfers   [] heat/ice application    [] other:      Other Objective/Functional Measures:   Had no difficulty with exercises  Program was challenging  No complaint of increased low back pain during session     Pain Level (0-10 scale) post treatment: 4/10    ASSESSMENT/Changes in Function:   Pt is slowly progressing toward goals. Pt cont with moderate pain in her low back. Pt cont to report decreased walking tolerance and difficulty with performing ADLs    Patient will continue to benefit from skilled PT services to modify and progress therapeutic interventions, address functional mobility deficits, address ROM deficits, address strength deficits, analyze and cue movement patterns, analyze and modify body mechanics/ergonomics and instruct in home and community integration to attain remaining goals. [x]  See Plan of Care  []  See progress note/recertification  []  See Discharge Summary         Progress towards goals / Updated goals:  Short term goals: To be accomplished within 1 week  1. Pt will be independent with HEP to maintain progression.   Met    Long term goals: To be accomplished within 4 weeks  1. Pt will improve FOTO score by 18 points to 58/100 to show improvement with functional mobility performance. 2. Pt will report no more than 1-2/10 to improve QOL. 3. Pt will be able to amb for 30 min or more with 0-1/10 pain to improve her QOL.     PLAN  []  Upgrade activities as tolerated     [x]  Continue plan of care  []  Update interventions per flow sheet       []  Discharge due to:_  []  Other:_      Joaquín Hernández PTA 3/17/2020  2:57 PM    Future Appointments   Date Time Provider Bharat Thakkar   3/17/2020  3:00 PM Earnie Dys [id number] [address]   3/20/2020  2:30 PM Jovon , PTA [facility] [address]   3/24/2020 12:30 PM Laly Mitchell [id number] [address]   3/27/2020  2:30 PM Jovon , PTA [facility] [address]   3/31/2020  2:30 PM Amy Milian [id number] [address]   4/3/2020  2:30 PM Earnie Dys [facility] [address]   5/26/2020 11:30 AM Asa Parnell,  [address]

## 2022-02-04 ENCOUNTER — HOSPITAL ENCOUNTER (EMERGENCY)
Age: 72
Discharge: STILL ADMITTED OR ADMINISTRATIVE | End: 2022-02-04

## 2022-02-04 ENCOUNTER — APPOINTMENT (OUTPATIENT)
Dept: GENERAL RADIOLOGY | Age: 72
End: 2022-02-04
Attending: PHYSICIAN ASSISTANT
Payer: MEDICARE

## 2022-02-04 ENCOUNTER — HOSPITAL ENCOUNTER (EMERGENCY)
Age: 72
Discharge: HOME | End: 2022-02-04
Attending: STUDENT IN AN ORGANIZED HEALTH CARE EDUCATION/TRAINING PROGRAM
Payer: MEDICARE

## 2022-02-04 VITALS
OXYGEN SATURATION: 98 % | HEART RATE: 79 BPM | RESPIRATION RATE: 18 BRPM | TEMPERATURE: 97.8 F | HEIGHT: 64 IN | SYSTOLIC BLOOD PRESSURE: 151 MMHG | BODY MASS INDEX: 25.75 KG/M2 | DIASTOLIC BLOOD PRESSURE: 82 MMHG

## 2022-02-04 DIAGNOSIS — U07.1 COVID-19: Primary | ICD-10-CM

## 2022-02-04 LAB
ALBUMIN SERPL-MCNC: 3.8 G/DL (ref 3.4–5)
ALBUMIN/GLOB SERPL: 0.9 {RATIO} (ref 0.8–1.7)
ALP SERPL-CCNC: 90 U/L (ref 45–117)
ALT SERPL-CCNC: 23 U/L (ref 13–56)
ANION GAP SERPL CALC-SCNC: 6 MMOL/L (ref 3–18)
APPEARANCE UR: CLEAR
AST SERPL-CCNC: 14 U/L (ref 10–38)
BACTERIA URNS QL MICRO: NEGATIVE /HPF
BASOPHILS # BLD: 0 K/UL (ref 0–0.1)
BASOPHILS NFR BLD: 1 % (ref 0–2)
BILIRUB SERPL-MCNC: 0.2 MG/DL (ref 0.2–1)
BILIRUB UR QL: NEGATIVE
BUN SERPL-MCNC: 13 MG/DL (ref 7–18)
BUN/CREAT SERPL: 22 (ref 12–20)
CALCIUM SERPL-MCNC: 9.8 MG/DL (ref 8.5–10.1)
CHLORIDE SERPL-SCNC: 105 MMOL/L (ref 100–111)
CO2 SERPL-SCNC: 27 MMOL/L (ref 21–32)
COLOR UR: YELLOW
COVID-19 RAPID TEST, COVR: DETECTED
CREAT SERPL-MCNC: 0.59 MG/DL (ref 0.6–1.3)
DIFFERENTIAL METHOD BLD: ABNORMAL
EOSINOPHIL # BLD: 0.1 K/UL (ref 0–0.4)
EOSINOPHIL NFR BLD: 1 % (ref 0–5)
EPITH CASTS URNS QL MICRO: NORMAL /LPF (ref 0–5)
ERYTHROCYTE [DISTWIDTH] IN BLOOD BY AUTOMATED COUNT: 14.5 % (ref 11.6–14.5)
FLUAV AG NPH QL IA: NEGATIVE
FLUBV AG NOSE QL IA: NEGATIVE
GLOBULIN SER CALC-MCNC: 4.2 G/DL (ref 2–4)
GLUCOSE SERPL-MCNC: 97 MG/DL (ref 74–99)
GLUCOSE UR STRIP.AUTO-MCNC: NEGATIVE MG/DL
HCT VFR BLD AUTO: 39.8 % (ref 35–45)
HGB BLD-MCNC: 13.1 G/DL (ref 12–16)
HGB UR QL STRIP: NEGATIVE
IMM GRANULOCYTES # BLD AUTO: 0 K/UL (ref 0–0.04)
IMM GRANULOCYTES NFR BLD AUTO: 0 % (ref 0–0.5)
KETONES UR QL STRIP.AUTO: ABNORMAL MG/DL
LEUKOCYTE ESTERASE UR QL STRIP.AUTO: ABNORMAL
LYMPHOCYTES # BLD: 2.1 K/UL (ref 0.9–3.6)
LYMPHOCYTES NFR BLD: 41 % (ref 21–52)
MCH RBC QN AUTO: 29.1 PG (ref 24–34)
MCHC RBC AUTO-ENTMCNC: 32.9 G/DL (ref 31–37)
MCV RBC AUTO: 88.4 FL (ref 78–100)
MONOCYTES # BLD: 0.8 K/UL (ref 0.05–1.2)
MONOCYTES NFR BLD: 15 % (ref 3–10)
NEUTS SEG # BLD: 2.2 K/UL (ref 1.8–8)
NEUTS SEG NFR BLD: 42 % (ref 40–73)
NITRITE UR QL STRIP.AUTO: NEGATIVE
NRBC # BLD: 0 K/UL (ref 0–0.01)
NRBC BLD-RTO: 0 PER 100 WBC
PH UR STRIP: 5.5 [PH] (ref 5–8)
PLATELET # BLD AUTO: 179 K/UL (ref 135–420)
PMV BLD AUTO: 11.2 FL (ref 9.2–11.8)
POTASSIUM SERPL-SCNC: 3.7 MMOL/L (ref 3.5–5.5)
PROT SERPL-MCNC: 8 G/DL (ref 6.4–8.2)
PROT UR STRIP-MCNC: ABNORMAL MG/DL
RBC # BLD AUTO: 4.5 M/UL (ref 4.2–5.3)
RBC #/AREA URNS HPF: NORMAL /HPF (ref 0–5)
SODIUM SERPL-SCNC: 138 MMOL/L (ref 136–145)
SOURCE, COVRS: ABNORMAL
SP GR UR REFRACTOMETRY: 1.03 (ref 1–1.03)
UROBILINOGEN UR QL STRIP.AUTO: 1 EU/DL (ref 0.2–1)
WBC # BLD AUTO: 5.1 K/UL (ref 4.6–13.2)
WBC URNS QL MICRO: NORMAL /HPF (ref 0–4)

## 2022-02-04 PROCEDURE — 99283 EMERGENCY DEPT VISIT LOW MDM: CPT

## 2022-02-04 PROCEDURE — 85025 COMPLETE CBC W/AUTO DIFF WBC: CPT

## 2022-02-04 PROCEDURE — 87635 SARS-COV-2 COVID-19 AMP PRB: CPT

## 2022-02-04 PROCEDURE — 81001 URINALYSIS AUTO W/SCOPE: CPT

## 2022-02-04 PROCEDURE — 71046 X-RAY EXAM CHEST 2 VIEWS: CPT

## 2022-02-04 PROCEDURE — 80053 COMPREHEN METABOLIC PANEL: CPT

## 2022-02-04 PROCEDURE — 87804 INFLUENZA ASSAY W/OPTIC: CPT

## 2022-02-04 RX ORDER — ONDANSETRON 2 MG/ML
4 INJECTION INTRAMUSCULAR; INTRAVENOUS ONCE
Status: DISCONTINUED | OUTPATIENT
Start: 2022-02-04 | End: 2022-02-04 | Stop reason: HOSPADM

## 2022-02-04 NOTE — ED PROVIDER NOTES
EMERGENCY DEPARTMENT HISTORY AND PHYSICAL EXAM    Date: 2/4/2022  Patient Name: [facility]    History of Presenting Illness     Chief Complaint   Patient presents with    Cough    Sore Throat         History Provided By: Patient    Chief Complaint: Cough, sore throat, weakness  Duration: 2-3 days  Timing:    Location:   Quality: aching  Severity: moderate  Modifying Factors:   Associated Symptoms: none       Additional History (Context): Stewart Loredo is a 66 y.o. female with a history of HTN, lung nodules presents via EMS for evaluation of \"all over not feeling well\"  x 2-3 day. Denies any fever at home, but feels hot and cold. Non productive cough. Sore throat and sinus congestion. Taking Coricidin without much relief. Has been vaccinated against COVID and no sick contacts that she knows of. Denies SOB, chest pain, fever, abdominal pain, vomiting, headache. PCP: Goran Sawyer MD    Current Facility-Administered Medications   Medication Dose Route Frequency Provider Last Rate Last Admin    ondansetron (ZOFRAN) injection 4 mg  4 mg IntraVENous ONCE Riya Pemberton PA-C         Current Outpatient Medications   Medication Sig Dispense Refill    ARIPiprazole (ABILIFY) 5 mg tablet Take 5 mg by mouth daily.  sertraline (ZOLOFT) 100 mg tablet Take 100 mg by mouth daily.  diclofenac (VOLTAREN) 1 % gel Apply 4 g to affected area four (4) times daily. 100 g 0    amLODIPine (NORVASC) 10 mg tablet Take 10 mg by mouth daily.  aspirin 81 mg chewable tablet Take 81 mg by mouth daily.          Past History     Past Medical History:  Past Medical History:   Diagnosis Date    Cystitis     Depression     High cholesterol     Hypertension     Near syncope 01/13/2020    Scalp contusion 01/17/2020       Past Surgical History:  Past Surgical History:   Procedure Laterality Date    COLONOSCOPY N/A 1/27/2020    COLONOSCOPY with polypectomy performed by Vanessa Martin MD at [facility]COPY Family History:  No family history on file. Social History:  Social History     Tobacco Use    Smoking status: Never Smoker    Smokeless tobacco: Never Used   Substance Use Topics    Alcohol use: Not Currently    Drug use: Never       Allergies: Allergies   Allergen Reactions    Aleve [Naproxen Sodium] Unable to Obtain    Bactrim [Sulfamethoprim] Diarrhea    Diphenhydramine Drowsiness     Patient states \"It makes me sleepy\"    Feldene [Piroxicam] Unknown (comments)     Shaking      Penicillins Rash         Review of Systems   Review of Systems   Constitutional: Positive for chills and fatigue. Negative for fever. HENT: Positive for congestion, rhinorrhea and sore throat. Negative for facial swelling. Respiratory: Positive for cough. Negative for shortness of breath and wheezing. Cardiovascular: Negative for chest pain and palpitations. Gastrointestinal: Negative for abdominal pain, diarrhea, nausea and vomiting. Genitourinary: Negative for difficulty urinating. Musculoskeletal: Positive for myalgias. Skin: Negative. Neurological: Positive for weakness. Negative for dizziness, syncope, numbness and headaches. All Other Systems Negative  Physical Exam     Vitals:    02/04/22 1439 02/04/22 1443   BP: (!) 151/82    Pulse: 79    Resp: 18    Temp: 97.8 °F (36.6 °C)    SpO2: 98%    Height:  5' 4\" (1.626 m)     Physical Exam  Constitutional:       General: She is not in acute distress. Appearance: She is well-developed and normal weight. She is not ill-appearing. HENT:      Head: Normocephalic and atraumatic. Right Ear: Tympanic membrane and ear canal normal.      Left Ear: Tympanic membrane and ear canal normal.      Nose: No congestion or rhinorrhea. Mouth/Throat:      Mouth: Mucous membranes are moist.      Pharynx: Oropharynx is clear. Uvula midline. No pharyngeal swelling, oropharyngeal exudate, posterior oropharyngeal erythema or uvula swelling.       Tonsils: No tonsillar exudate or tonsillar abscesses. Eyes:      Conjunctiva/sclera: Conjunctivae normal.      Pupils: Pupils are equal, round, and reactive to light. Cardiovascular:      Rate and Rhythm: Normal rate and regular rhythm. Heart sounds: Normal heart sounds. Pulmonary:      Effort: Pulmonary effort is normal.      Breath sounds: Normal breath sounds. Abdominal:      Palpations: Abdomen is soft. Tenderness: There is no abdominal tenderness. There is no guarding or rebound. Musculoskeletal:      Cervical back: Normal range of motion. Skin:     General: Skin is warm and dry. Neurological:      General: No focal deficit present. Mental Status: She is alert and oriented to person, place, and time. Cranial Nerves: No cranial nerve deficit or facial asymmetry. Sensory: No sensory deficit. Motor: No weakness, tremor, abnormal muscle tone or pronator drift. Coordination: Coordination normal. Finger-Nose-Finger Test normal. Rapid alternating movements normal.      Gait: Gait normal.      Comments: Pt was in wheelchair on arrival, by EMS. Pt walking normally in triage without assistance. Psychiatric:         Mood and Affect: Mood normal.         Behavior: Behavior normal.           Diagnostic Study Results     Labs -     Recent Results (from the past 12 hour(s))   CBC WITH AUTOMATED DIFF    Collection Time: 02/04/22  3:15 PM   Result Value Ref Range    WBC 5.1 4.6 - 13.2 K/uL    RBC 4.50 4. 20 - 5.30 M/uL    HGB 13.1 12.0 - 16.0 g/dL    HCT 39.8 35.0 - 45.0 %    MCV 88.4 78.0 - 100.0 FL    MCH 29.1 24.0 - 34.0 PG    MCHC 32.9 31.0 - 37.0 g/dL    RDW 14.5 11.6 - 14.5 %    PLATELET 037 987 - 813 K/uL    MPV 11.2 9.2 - 11.8 FL    NRBC 0.0 0  WBC    ABSOLUTE NRBC 0.00 0.00 - 0.01 K/uL    NEUTROPHILS 42 40 - 73 %    LYMPHOCYTES 41 21 - 52 %    MONOCYTES 15 (H) 3 - 10 %    EOSINOPHILS 1 0 - 5 %    BASOPHILS 1 0 - 2 %    IMMATURE GRANULOCYTES 0 0.0 - 0.5 %    ABS. NEUTROPHILS 2.2 1.8 - 8.0 K/UL    ABS. LYMPHOCYTES 2.1 0.9 - 3.6 K/UL    ABS. MONOCYTES 0.8 0.05 - 1.2 K/UL    ABS. EOSINOPHILS 0.1 0.0 - 0.4 K/UL    ABS. BASOPHILS 0.0 0.0 - 0.1 K/UL    ABS. IMM. GRANS. 0.0 0.00 - 0.04 K/UL    DF AUTOMATED     METABOLIC PANEL, COMPREHENSIVE    Collection Time: 02/04/22  3:15 PM   Result Value Ref Range    Sodium 138 136 - 145 mmol/L    Potassium 3.7 3.5 - 5.5 mmol/L    Chloride 105 100 - 111 mmol/L    CO2 27 21 - 32 mmol/L    Anion gap 6 3.0 - 18 mmol/L    Glucose 97 74 - 99 mg/dL    BUN 13 7.0 - 18 MG/DL    Creatinine 0.59 (L) 0.6 - 1.3 MG/DL    BUN/Creatinine ratio 22 (H) 12 - 20      GFR est AA >60 >60 ml/min/1.73m2    GFR est non-AA >60 >60 ml/min/1.73m2    Calcium 9.8 8.5 - 10.1 MG/DL    Bilirubin, total 0.2 0.2 - 1.0 MG/DL    ALT (SGPT) 23 13 - 56 U/L    AST (SGOT) 14 10 - 38 U/L    Alk.  phosphatase 90 45 - 117 U/L    Protein, total 8.0 6.4 - 8.2 g/dL    Albumin 3.8 3.4 - 5.0 g/dL    Globulin 4.2 (H) 2.0 - 4.0 g/dL    A-G Ratio 0.9 0.8 - 1.7     URINALYSIS W/ RFLX MICROSCOPIC    Collection Time: 02/04/22  3:15 PM   Result Value Ref Range    Color YELLOW      Appearance CLEAR      Specific gravity 1.027 1.005 - 1.030      pH (UA) 5.5 5.0 - 8.0      Protein TRACE (A) NEG mg/dL    Glucose Negative NEG mg/dL    Ketone TRACE (A) NEG mg/dL    Bilirubin Negative NEG      Blood Negative NEG      Urobilinogen 1.0 0.2 - 1.0 EU/dL    Nitrites Negative NEG      Leukocyte Esterase SMALL (A) NEG     URINE MICROSCOPIC ONLY    Collection Time: 02/04/22  3:15 PM   Result Value Ref Range    WBC 4 to 10 0 - 4 /hpf    RBC NONE 0 - 5 /hpf    Epithelial cells 1+ 0 - 5 /lpf    Bacteria Negative NEG /hpf   COVID-19 RAPID TEST    Collection Time: 02/04/22  6:25 PM   Result Value Ref Range    Specimen source Please find results under separate order      COVID-19 rapid test Detected (AA) NOTD     INFLUENZA A & B AG (RAPID TEST)    Collection Time: 02/04/22  6:25 PM   Result Value Ref Range    Influenza A Antigen Negative NEG      Influenza B Antigen Negative NEG         Radiologic Studies -   XR CHEST PA LAT   Final Result   No acute findings or interval change. CT Results  (Last 48 hours)    None        CXR Results  (Last 48 hours)               02/04/22 1512  XR CHEST PA LAT Final result    Impression:  No acute findings or interval change. Narrative:  EXAM: XR CHEST PA LAT       INDICATION: cough, weakness       COMPARISON: 1/13/2020. FINDINGS: PA and lateral radiographs of the chest demonstrate clear lungs. The   cardiac and mediastinal contours and pulmonary vascularity are normal.   Scoliosis. No acute bone findings. .                    Medical Decision Making   I am the first provider for this patient. I reviewed the vital signs, available nursing notes, past medical history, past surgical history, family history and social history. Vital Signs-Reviewed the patient's vital signs. Records Reviewed: Nursing Notes and Old Medical Records     Procedures: None   Procedures    Provider Notes (Medical Decision Making): Concern for possible COVID, pneumonia, bronchitis, UTI, anemia    Will check urine as well as for COVID, CXR and basic labs. Pt exam normal.     1750: Pt COVId/flu test not performed. Contacted the lab, states that they don't have specimen. Pt reports that she had it collected. Pt is COVID positive. Informed her of her positive results, recommend symptomatic treatment. Pt has no abdominal pain, jsut feels \"queasy\" sometimes. Ordered her some zofran to see if would help, she requested IV fluids because felt might help her overall feel better. Is concerned because she is taking care of her brother who is very sick. Pt does nto have fever, vitals signs normal other than her HTN slight. No hypoxia or tachypnea. No admission criteria. Discussed returning to the ER if any worsening, treat symptomatically, make sure to take tylenol and drink plenty of fluids.  Recommend wearing mask in home, try to keep distance as much as possible. MED RECONCILIATION:  Current Facility-Administered Medications   Medication Dose Route Frequency    ondansetron (ZOFRAN) injection 4 mg  4 mg IntraVENous ONCE     Current Outpatient Medications   Medication Sig    ARIPiprazole (ABILIFY) 5 mg tablet Take 5 mg by mouth daily.  sertraline (ZOLOFT) 100 mg tablet Take 100 mg by mouth daily.  diclofenac (VOLTAREN) 1 % gel Apply 4 g to affected area four (4) times daily.  amLODIPine (NORVASC) 10 mg tablet Take 10 mg by mouth daily.  aspirin 81 mg chewable tablet Take 81 mg by mouth daily. Disposition:  Home     DISCHARGE NOTE:   Pt has been reexamined. Patient has no new complaints, changes, or physical findings. Care plan outlined and precautions discussed. Results of workup were reviewed with the patient. All medications were reviewed with the patient. All of pt's questions and concerns were addressed. Patient was instructed and agrees to follow up with PCP as well as to return to the ED upon further deterioration. Patient is ready to go home. Follow-up Information     Follow up With Specialties Details Why Contact Info    Louann Doran MD Family Medicine Schedule an appointment as soon as possible for a visit in 3 days  [facility] [address]  [phone]      [facility] DEPT Emergency Medicine  If symptoms worsen [address] [place] [id number]  [phone]          Current Discharge Medication List              Diagnosis     Clinical Impression:   1. COVID-19          \"Please note that this dictation was completed with [employer], the computer voice recognition software. Quite often unanticipated grammatical, syntax, homophones, and other interpretive errors are inadvertently transcribed by the computer software. Please disregard these errors. Please excuse any errors that have escaped final proofreading. \"

## 2022-02-04 NOTE — ED NOTES
Pt brought in by EMS for \"not feeling well\". Sore throat, congestion and cough. Feeling weak. Wants to get checked out. Taking coricidin. Ongoing x 2 days. No fever but having hot and cold flashes. I performed a brief evaluation, including history and physical, of the patient here in triage and I have determined that pt will need further treatment and evaluation from the main side ER physician. I have placed initial orders to help in expediting patients care.

## 2022-02-05 NOTE — DISCHARGE INSTRUCTIONS
Return to the ER if you have any shortness of breath or chest pain    [facility] Guidance for Preventing the Spread of Coronavirus Disease 2019 (COVID-19) in Homes and Residential Communities  Prevention steps for:  People with confirmed or suspected COVID-19 (including persons under investigation) who do not need to be hospitalized  and  People with confirmed COVID-19 who were hospitalized and determined to be medically stable to go home  Your healthcare provider and public health staff will evaluate whether you can be cared for at home. If it is determined that you do not need to be hospitalized and can be isolated at home, you will be monitored by staff from your local or state health department. You should follow the prevention steps below until a healthcare provider or local or state health department says you can return to your normal activities. Stay home except to get medical care  You should restrict activities outside your home, except for getting medical care. Do not go to work, school, or public areas. Avoid using public transportation, ride-sharing, or taxis. Separate yourself from other people and animals in your home  People: As much as possible, you should stay in a specific room and away from other people in your home. Also, you should use a separate bathroom, if available. Animals: You should restrict contact with pets and other animals while you are sick with COVID-19, just like you would around other people. Although there have not been reports of pets or other animals becoming sick with COVID-19, it is still recommended that people sick with COVID-19 limit contact with animals until more information is known about the virus. When possible, have another member of your household care for your animals while you are sick. If you are sick with COVID-19, avoid contact with your pet, including petting, snuggling, being kissed or licked, and sharing food.  If you must care for your pet or be around animals while you are sick, wash your hands before and after you interact with pets and wear a facemask. Call ahead before visiting your doctor  If you have a medical appointment, call the healthcare provider and tell them that you have or may have COVID-19. This will help the healthcare provider's office take steps to keep other people from getting infected or exposed. Wear a facemask  You should wear a facemask when you are around other people (e.g., sharing a room or vehicle) or pets and before you enter a healthcare provider's office. If you are not able to wear a facemask (for example, because it causes trouble breathing), then people who live with you should not stay in the same room with you, or they should wear a facemask if they enter your room. Cover your coughs and sneezes  Cover your mouth and nose with a tissue when you cough or sneeze. Throw used tissues in a lined trash can; immediately wash your hands with soap and water for at least 20 seconds or clean your hands with an alcohol-based hand  that contains 60 to 95% alcohol, covering all surfaces of your hands and rubbing them together until they feel dry. Soap and water should be used preferentially if hands are visibly dirty. Clean your hands often  Wash your hands often with soap and water for at least 20 seconds or clean your hands with an alcohol-based hand  that contains 60 to 95% alcohol, covering all surfaces of your hands and rubbing them together until they feel dry. Soap and water should be used preferentially if hands are visibly dirty. Avoid touching your eyes, nose, and mouth with unwashed hands. Avoid sharing personal household items  You should not share dishes, drinking glasses, cups, eating utensils, towels, or bedding with other people or pets in your home. After using these items, they should be washed thoroughly with soap and water.   Clean all high-touch surfaces everyday  High touch surfaces include counters, tabletops, doorknobs, bathroom fixtures, toilets, phones, keyboards, tablets, and bedside tables. Also, clean any surfaces that may have blood, stool, or body fluids on them. Use a household cleaning spray or wipe, according to the label instructions. Labels contain instructions for safe and effective use of the cleaning product including precautions you should take when applying the product, such as wearing gloves and making sure you have good ventilation during use of the product. Monitor your symptoms  Seek prompt medical attention if your illness is worsening (e.g., difficulty breathing). Before seeking care, call your healthcare provider and tell them that you have, or are being evaluated for, COVID-19. Put on a facemask before you enter the facility. These steps will help the healthcare provider's office to keep other people in the office or waiting room from getting infected or exposed. Ask your healthcare provider to call the local or state health department. Persons who are placed under active monitoring or facilitated self-monitoring should follow instructions provided by their local health department or occupational health professionals, as appropriate. If you have a medical emergency and need to call 911, notify the dispatch personnel that you have, or are being evaluated for COVID-19. If possible, put on a facemask before emergency medical services arrive. Discontinuing home isolation  Patients with confirmed COVID-19 should remain under home isolation precautions until the risk of secondary transmission to others is thought to be low. The decision to discontinue home isolation precautions should be made on a case-by-case basis, in consultation with healthcare providers and state and local health departments.   Recommended precautions for household members, intimate partners, and caregivers in a nonhealthcare setting of  A patient with symptomatic laboratory-confirmed COVID-19  or  A patient under investigation  Household members, intimate partners, and caregivers in a nonhealthcare setting may have close contact2 with a person with symptomatic, laboratory-confirmed COVID-19 or a person under investigation. Close contacts should monitor their health; they should call their healthcare provider right away if they develop symptoms suggestive of COVID-19 (e.g., fever, cough, shortness of breath)   Close contacts should also follow these recommendations:  Make sure that you understand and can help the patient follow their healthcare provider's instructions for medication(s) and care. You should help the patient with basic needs in the home and provide support for getting groceries, prescriptions, and other personal needs. Monitor the patient's symptoms. If the patient is getting sicker, call his or her healthcare provider and tell them that the patient has laboratory-confirmed COVID-19. This will help the healthcare provider's office take steps to keep other people in the office or waiting room from getting infected. Ask the healthcare provider to call the local or [facility] health department for additional guidance. If the patient has a medical emergency and you need to call 911, notify the dispatch personnel that the patient has, or is being evaluated for COVID-19. Household members should stay in another room or be  from the patient as much as possible. Household members should use a separate bedroom and bathroom, if available. Prohibit visitors who do not have an essential need to be in the home. Household members should care for any pets in the home. Do not handle pets or other animals while sick. Make sure that shared spaces in the home have good air flow, such as by an air conditioner or an opened window, weather permitting. Perform hand hygiene frequently.  Wash your hands often with soap and water for at least 20 seconds or use an alcohol-based hand  that contains 60 to 95% alcohol, covering all surfaces of your hands and rubbing them together until they feel dry. Soap and water should be used preferentially if hands are visibly dirty. Avoid touching your eyes, nose, and mouth with unwashed hands. You and the patient should wear a facemask if you are in the same room. Wear a disposable facemask and gloves when you touch or have contact with the patient's blood, stool, or body fluids, such as saliva, sputum, nasal mucus, vomit, urine. Throw out disposable facemasks and gloves after using them. Do not reuse. When removing personal protective equipment, first remove and dispose of gloves. Then, immediately clean your hands with soap and water or alcohol-based hand . Next, remove and dispose of facemask, and immediately clean your hands again with soap and water or alcohol-based hand . Avoid sharing household items with the patient. You should not share dishes, drinking glasses, cups, eating utensils, towels, bedding, or other items. After the patient uses these items, you should wash them thoroughly (see below [employer]). Clean all high-touch surfaces, such as counters, tabletops, doorknobs, bathroom fixtures, toilets, phones, keyboards, tablets, and bedside tables, every day. Also, clean any surfaces that may have blood, stool, or body fluids on them. Use a household cleaning spray or wipe, according to the label instructions. Labels contain instructions for safe and effective use of the cleaning product including precautions you should take when applying the product, such as wearing gloves and making sure you have good ventilation during use of the product. [address] thoroughly. Immediately remove and wash clothes or bedding that have blood, stool, or body fluids on them. Wear disposable gloves while handling soiled items and keep soiled items away from your body.  Clean your hands (with soap and water or an alcohol-based hand ) immediately after removing your gloves. Read and follow directions on labels of laundry or clothing items and detergent. In general, using a normal laundry detergent according to washing machine instructions and dry thoroughly using the warmest temperatures recommended on the clothing label. Place all used disposable gloves, facemasks, and other contaminated items in a lined container before disposing of them with other household waste. Clean your hands (with soap and water or an alcohol-based hand ) immediately after handling these items. Soap and water should be used preferentially if hands are visibly dirty. Discuss any additional questions with your state or local health department or healthcare provider. Footnotes  2Close contact is defined as--  a) being within approximately 6 feet (2 meters) of a COVID-19 case for a prolonged period of time; close contact can occur while caring for, living with, visiting, or sharing a health care waiting area or room with a COVID-19 case  - or -  b) having direct contact with infectious secretions of a COVID-19 case (e.g., being coughed on).   Page last reviewed: February 18, 2020   Content source: [facility] Division of Viral Diseases

## 2022-02-07 ENCOUNTER — PATIENT OUTREACH (OUTPATIENT)
Dept: CASE MANAGEMENT | Age: 72
End: 2022-02-07

## 2022-02-07 NOTE — PROGRESS NOTES
Date/Time:  2/7/2022 9:19 AM   Call within 2 business days of discharge: Yes   Attempted to reach patient by telephone. Left HIPPA compliant message requesting a return call. Will attempt to reach patient again.

## 2022-02-08 ENCOUNTER — PATIENT OUTREACH (OUTPATIENT)
Dept: CASE MANAGEMENT | Age: 72
End: 2022-02-08

## 2022-02-08 NOTE — PROGRESS NOTES
Attempted to contact patient for COVID Care Transitions. No answer, left message with contact information provided. Unable to contact, will close episode.

## 2022-03-30 ENCOUNTER — HOSPITAL ENCOUNTER (INPATIENT)
Age: 72
LOS: 12 days | Discharge: HOME | DRG: 885 | End: 2022-04-11
Attending: STUDENT IN AN ORGANIZED HEALTH CARE EDUCATION/TRAINING PROGRAM | Admitting: PSYCHIATRY & NEUROLOGY
Payer: MEDICARE

## 2022-03-30 DIAGNOSIS — R45.851 PASSIVE SUICIDAL IDEATIONS: ICD-10-CM

## 2022-03-30 DIAGNOSIS — F32.9 MAJOR DEPRESSIVE DISORDER WITH CURRENT ACTIVE EPISODE, UNSPECIFIED DEPRESSION EPISODE SEVERITY, UNSPECIFIED WHETHER RECURRENT: Primary | ICD-10-CM

## 2022-03-30 PROBLEM — F32.A DEPRESSION: Status: ACTIVE | Noted: 2022-03-30

## 2022-03-30 LAB
ALBUMIN SERPL-MCNC: 3.9 G/DL (ref 3.4–5)
ALBUMIN/GLOB SERPL: 0.8 {RATIO} (ref 0.8–1.7)
ALP SERPL-CCNC: 114 U/L (ref 45–117)
ALT SERPL-CCNC: 23 U/L (ref 13–56)
AMPHET UR QL SCN: NEGATIVE
ANION GAP SERPL CALC-SCNC: 7 MMOL/L (ref 3–18)
APPEARANCE UR: CLEAR
AST SERPL-CCNC: 13 U/L (ref 10–38)
BACTERIA URNS QL MICRO: ABNORMAL /HPF
BARBITURATES UR QL SCN: NEGATIVE
BASOPHILS # BLD: 0 K/UL (ref 0–0.1)
BASOPHILS NFR BLD: 1 % (ref 0–2)
BENZODIAZ UR QL: NEGATIVE
BILIRUB SERPL-MCNC: 0.4 MG/DL (ref 0.2–1)
BILIRUB UR QL: NEGATIVE
BNP SERPL-MCNC: 15 PG/ML (ref 0–900)
BUN SERPL-MCNC: 16 MG/DL (ref 7–18)
BUN/CREAT SERPL: 24 (ref 12–20)
CALCIUM SERPL-MCNC: 10.3 MG/DL (ref 8.5–10.1)
CANNABINOIDS UR QL SCN: NEGATIVE
CHLORIDE SERPL-SCNC: 101 MMOL/L (ref 100–111)
CO2 SERPL-SCNC: 29 MMOL/L (ref 21–32)
COCAINE UR QL SCN: NEGATIVE
COLOR UR: YELLOW
COVID-19 RAPID TEST, COVR: NOT DETECTED
CREAT SERPL-MCNC: 0.68 MG/DL (ref 0.6–1.3)
DIFFERENTIAL METHOD BLD: ABNORMAL
EOSINOPHIL # BLD: 0 K/UL (ref 0–0.4)
EOSINOPHIL NFR BLD: 0 % (ref 0–5)
EPITH CASTS URNS QL MICRO: ABNORMAL /LPF (ref 0–5)
ERYTHROCYTE [DISTWIDTH] IN BLOOD BY AUTOMATED COUNT: 14.2 % (ref 11.6–14.5)
ETHANOL SERPL-MCNC: <3 MG/DL (ref 0–3)
GLOBULIN SER CALC-MCNC: 5 G/DL (ref 2–4)
GLUCOSE SERPL-MCNC: 108 MG/DL (ref 74–99)
GLUCOSE UR STRIP.AUTO-MCNC: NEGATIVE MG/DL
HCT VFR BLD AUTO: 41.9 % (ref 35–45)
HDSCOM,HDSCOM: NORMAL
HGB BLD-MCNC: 14.3 G/DL (ref 12–16)
HGB UR QL STRIP: NEGATIVE
HYALINE CASTS URNS QL MICRO: ABNORMAL /LPF (ref 0–2)
IMM GRANULOCYTES # BLD AUTO: 0 K/UL (ref 0–0.04)
IMM GRANULOCYTES NFR BLD AUTO: 0 % (ref 0–0.5)
KETONES UR QL STRIP.AUTO: ABNORMAL MG/DL
LEUKOCYTE ESTERASE UR QL STRIP.AUTO: ABNORMAL
LIPASE SERPL-CCNC: 76 U/L (ref 73–393)
LYMPHOCYTES # BLD: 1.7 K/UL (ref 0.9–3.6)
LYMPHOCYTES NFR BLD: 24 % (ref 21–52)
MAGNESIUM SERPL-MCNC: 1.9 MG/DL (ref 1.6–2.6)
MCH RBC QN AUTO: 29.5 PG (ref 24–34)
MCHC RBC AUTO-ENTMCNC: 34.1 G/DL (ref 31–37)
MCV RBC AUTO: 86.4 FL (ref 78–100)
METHADONE UR QL: NEGATIVE
MONOCYTES # BLD: 0.8 K/UL (ref 0.05–1.2)
MONOCYTES NFR BLD: 11 % (ref 3–10)
MUCOUS THREADS URNS QL MICRO: ABNORMAL /LPF
NEUTS SEG # BLD: 4.4 K/UL (ref 1.8–8)
NEUTS SEG NFR BLD: 64 % (ref 40–73)
NITRITE UR QL STRIP.AUTO: NEGATIVE
NRBC # BLD: 0 K/UL (ref 0–0.01)
NRBC BLD-RTO: 0 PER 100 WBC
OPIATES UR QL: NEGATIVE
PCP UR QL: NEGATIVE
PH UR STRIP: 5 [PH] (ref 5–8)
PLATELET # BLD AUTO: 189 K/UL (ref 135–420)
PMV BLD AUTO: 12.2 FL (ref 9.2–11.8)
POTASSIUM SERPL-SCNC: 3.4 MMOL/L (ref 3.5–5.5)
PROT SERPL-MCNC: 8.9 G/DL (ref 6.4–8.2)
PROT UR STRIP-MCNC: NEGATIVE MG/DL
RBC # BLD AUTO: 4.85 M/UL (ref 4.2–5.3)
RBC #/AREA URNS HPF: ABNORMAL /HPF (ref 0–5)
SODIUM SERPL-SCNC: 137 MMOL/L (ref 136–145)
SOURCE, COVRS: NORMAL
SP GR UR REFRACTOMETRY: 1.02 (ref 1–1.03)
TROPONIN-HIGH SENSITIVITY: 4 NG/L (ref 0–54)
UROBILINOGEN UR QL STRIP.AUTO: 1 EU/DL (ref 0.2–1)
WBC # BLD AUTO: 6.9 K/UL (ref 4.6–13.2)
WBC URNS QL MICRO: ABNORMAL /HPF (ref 0–4)

## 2022-03-30 PROCEDURE — G0378 HOSPITAL OBSERVATION PER HR: HCPCS

## 2022-03-30 PROCEDURE — 85025 COMPLETE CBC W/AUTO DIFF WBC: CPT

## 2022-03-30 PROCEDURE — 81001 URINALYSIS AUTO W/SCOPE: CPT

## 2022-03-30 PROCEDURE — 84484 ASSAY OF TROPONIN QUANT: CPT

## 2022-03-30 PROCEDURE — 80307 DRUG TEST PRSMV CHEM ANLYZR: CPT

## 2022-03-30 PROCEDURE — 65220000003 HC RM SEMIPRIVATE PSYCH

## 2022-03-30 PROCEDURE — 82077 ASSAY SPEC XCP UR&BREATH IA: CPT

## 2022-03-30 PROCEDURE — 80053 COMPREHEN METABOLIC PANEL: CPT

## 2022-03-30 PROCEDURE — 83880 ASSAY OF NATRIURETIC PEPTIDE: CPT

## 2022-03-30 PROCEDURE — 83690 ASSAY OF LIPASE: CPT

## 2022-03-30 PROCEDURE — 87635 SARS-COV-2 COVID-19 AMP PRB: CPT

## 2022-03-30 PROCEDURE — 99285 EMERGENCY DEPT VISIT HI MDM: CPT

## 2022-03-30 PROCEDURE — 83735 ASSAY OF MAGNESIUM: CPT

## 2022-03-30 RX ORDER — TRAZODONE HYDROCHLORIDE 50 MG/1
25 TABLET ORAL
Status: DISCONTINUED | OUTPATIENT
Start: 2022-03-30 | End: 2022-04-11 | Stop reason: HOSPADM

## 2022-03-30 RX ORDER — ACETAMINOPHEN 500 MG
500 TABLET ORAL
Status: DISCONTINUED | OUTPATIENT
Start: 2022-03-30 | End: 2022-04-11 | Stop reason: HOSPADM

## 2022-03-30 RX ORDER — SERTRALINE HYDROCHLORIDE 50 MG/1
100 TABLET, FILM COATED ORAL DAILY
Status: DISCONTINUED | OUTPATIENT
Start: 2022-03-31 | End: 2022-03-31

## 2022-03-30 RX ORDER — HYDROXYZINE PAMOATE 25 MG/1
25 CAPSULE ORAL
Status: DISCONTINUED | OUTPATIENT
Start: 2022-03-30 | End: 2022-04-11 | Stop reason: HOSPADM

## 2022-03-30 RX ORDER — AMLODIPINE BESYLATE 10 MG/1
10 TABLET ORAL DAILY
Status: DISCONTINUED | OUTPATIENT
Start: 2022-03-31 | End: 2022-04-11 | Stop reason: HOSPADM

## 2022-03-30 NOTE — ED PROVIDER NOTES
EMERGENCY DEPARTMENT HISTORY AND PHYSICAL EXAM    Date: 3/30/2022  Patient Name: Judson Moncada [facility]    History of Presenting Illness     Chief Complaint   Patient presents with    Vomiting         History Provided By: Patient and POA, nephew    Additional History (Context): Rhiannon Martin is a 66 y.o. female with hypertension, hyperlipidemia and depression who presents with c/o feeling overwhelmed. Feeling overwhelmed by taking care of her brother. Nephew had his uncle removed to relieve her stressors but then other nephew became upset w/her. On abilify, sertraline and isn't any better. Talking about life not worth living, doesn't want to continue. PCP: Malcolm Eden MD    Current Outpatient Medications   Medication Sig Dispense Refill    ARIPiprazole (ABILIFY) 5 mg tablet Take 5 mg by mouth daily.  sertraline (ZOLOFT) 100 mg tablet Take 100 mg by mouth daily.  diclofenac (VOLTAREN) 1 % gel Apply 4 g to affected area four (4) times daily. 100 g 0    amLODIPine (NORVASC) 10 mg tablet Take 10 mg by mouth daily.  aspirin 81 mg chewable tablet Take 81 mg by mouth daily. Past History     Past Medical History:  Past Medical History:   Diagnosis Date    Cystitis     Depression     High cholesterol     Hypertension     Near syncope 01/13/2020    Scalp contusion 01/17/2020       Past Surgical History:  Past Surgical History:   Procedure Laterality Date    COLONOSCOPY N/A 1/27/2020    COLONOSCOPY with polypectomy performed by Libia David MD at [facility]       Family History:  No family history on file. Social History:  Social History     Tobacco Use    Smoking status: Never Smoker    Smokeless tobacco: Never Used   Substance Use Topics    Alcohol use: Not Currently    Drug use: Never       Allergies:   Allergies   Allergen Reactions    Aleve [Naproxen Sodium] Unable to Obtain    Bactrim [Sulfamethoprim] Diarrhea    Diphenhydramine Drowsiness     Patient states \"It makes me sleepy\"    Feldene [Piroxicam] Unknown (comments)     Shaking      Penicillins Rash         Review of Systems   Review of Systems   Constitutional: Negative. HENT: Negative. Eyes: Negative. Respiratory: Negative. Cardiovascular: Negative. Gastrointestinal: Negative. Endocrine: Negative. Genitourinary: Negative. Musculoskeletal: Negative. Skin: Negative. Allergic/Immunologic: Negative. Neurological: Negative. Hematological: Negative. Psychiatric/Behavioral: Positive for dysphoric mood and suicidal ideas. All Other Systems Negative  Physical Exam     Vitals:    03/30/22 1623   BP: (!) 163/88   Pulse: (!) 110   Resp: 18   Temp: 99.5 °F (37.5 °C)   SpO2: 97%   Weight: 73.5 kg (162 lb)   Height: 5' 4\" (1.626 m)     Physical Exam  Vitals and nursing note reviewed. Constitutional:       Appearance: She is well-developed. HENT:      Head: Normocephalic and atraumatic. Eyes:      Pupils: Pupils are equal, round, and reactive to light. Neck:      Thyroid: No thyromegaly. Vascular: No JVD. Trachea: No tracheal deviation. Cardiovascular:      Rate and Rhythm: Normal rate and regular rhythm. Heart sounds: Normal heart sounds. No murmur heard. No friction rub. No gallop. Pulmonary:      Effort: Pulmonary effort is normal. No respiratory distress. Breath sounds: Normal breath sounds. No stridor. No wheezing or rales. Chest:      Chest wall: No tenderness. Abdominal:      General: There is no distension. Palpations: Abdomen is soft. There is no mass. Tenderness: There is no abdominal tenderness. There is no guarding or rebound. Musculoskeletal:         General: No tenderness. Lymphadenopathy:      Cervical: No cervical adenopathy. Skin:     General: Skin is warm and dry. Coloration: Skin is not pale. Findings: No erythema or rash.    Neurological:      Mental Status: She is alert and oriented to person, place, and time. Psychiatric:         Behavior: Behavior normal.         Thought Content: Thought content normal.      Comments: sad         Diagnostic Study Results     Labs -     Recent Results (from the past 12 hour(s))   CBC WITH AUTOMATED DIFF    Collection Time: 03/30/22  4:46 PM   Result Value Ref Range    WBC 6.9 4.6 - 13.2 K/uL    RBC 4.85 4.20 - 5.30 M/uL    HGB 14.3 12.0 - 16.0 g/dL    HCT 41.9 35.0 - 45.0 %    MCV 86.4 78.0 - 100.0 FL    MCH 29.5 24.0 - 34.0 PG    MCHC 34.1 31.0 - 37.0 g/dL    RDW 14.2 11.6 - 14.5 %    PLATELET 468 575 - 628 K/uL    MPV 12.2 (H) 9.2 - 11.8 FL    NRBC 0.0 0  WBC    ABSOLUTE NRBC 0.00 0.00 - 0.01 K/uL    NEUTROPHILS 64 40 - 73 %    LYMPHOCYTES 24 21 - 52 %    MONOCYTES 11 (H) 3 - 10 %    EOSINOPHILS 0 0 - 5 %    BASOPHILS 1 0 - 2 %    IMMATURE GRANULOCYTES 0 0.0 - 0.5 %    ABS. NEUTROPHILS 4.4 1.8 - 8.0 K/UL    ABS. LYMPHOCYTES 1.7 0.9 - 3.6 K/UL    ABS. MONOCYTES 0.8 0.05 - 1.2 K/UL    ABS. EOSINOPHILS 0.0 0.0 - 0.4 K/UL    ABS. BASOPHILS 0.0 0.0 - 0.1 K/UL    ABS. IMM. GRANS. 0.0 0.00 - 0.04 K/UL    DF AUTOMATED     METABOLIC PANEL, COMPREHENSIVE    Collection Time: 03/30/22  4:46 PM   Result Value Ref Range    Sodium 137 136 - 145 mmol/L    Potassium 3.4 (L) 3.5 - 5.5 mmol/L    Chloride 101 100 - 111 mmol/L    CO2 29 21 - 32 mmol/L    Anion gap 7 3.0 - 18 mmol/L    Glucose 108 (H) 74 - 99 mg/dL    BUN 16 7.0 - 18 MG/DL    Creatinine 0.68 0.6 - 1.3 MG/DL    BUN/Creatinine ratio 24 (H) 12 - 20      GFR est AA >60 >60 ml/min/1.73m2    GFR est non-AA >60 >60 ml/min/1.73m2    Calcium 10.3 (H) 8.5 - 10.1 MG/DL    Bilirubin, total 0.4 0.2 - 1.0 MG/DL    ALT (SGPT) 23 13 - 56 U/L    AST (SGOT) 13 10 - 38 U/L    Alk.  phosphatase 114 45 - 117 U/L    Protein, total 8.9 (H) 6.4 - 8.2 g/dL    Albumin 3.9 3.4 - 5.0 g/dL    Globulin 5.0 (H) 2.0 - 4.0 g/dL    A-G Ratio 0.8 0.8 - 1.7     TROPONIN-HIGH SENSITIVITY    Collection Time: 03/30/22  4:46 PM   Result Value Ref Range Troponin-High Sensitivity 4 0 - 54 ng/L   MAGNESIUM    Collection Time: 03/30/22  4:46 PM   Result Value Ref Range    Magnesium 1.9 1.6 - 2.6 mg/dL   LIPASE    Collection Time: 03/30/22  4:46 PM   Result Value Ref Range    Lipase 76 73 - 393 U/L   ETHYL ALCOHOL    Collection Time: 03/30/22  4:46 PM   Result Value Ref Range    ALCOHOL(ETHYL),SERUM <3 0 - 3 MG/DL   NT-PRO BNP    Collection Time: 03/30/22  4:46 PM   Result Value Ref Range    NT pro-BNP 15 0 - 900 PG/ML   COVID-19 RAPID TEST    Collection Time: 03/30/22  6:06 PM   Result Value Ref Range    Specimen source Nasopharyngeal      COVID-19 rapid test Not detected NOTD     URINALYSIS W/ RFLX MICROSCOPIC    Collection Time: 03/30/22  6:34 PM   Result Value Ref Range    Color YELLOW      Appearance CLEAR      Specific gravity 1.021 1.005 - 1.030      pH (UA) 5.0 5.0 - 8.0      Protein Negative NEG mg/dL    Glucose Negative NEG mg/dL    Ketone TRACE (A) NEG mg/dL    Bilirubin Negative NEG      Blood Negative NEG      Urobilinogen 1.0 0.2 - 1.0 EU/dL    Nitrites Negative NEG      Leukocyte Esterase SMALL (A) NEG         Radiologic Studies -   No orders to display     CT Results  (Last 48 hours)    None        CXR Results  (Last 48 hours)    None            Medical Decision Making   I am the first provider for this patient. I reviewed the vital signs, available nursing notes, past medical history, past surgical history, family history and social history. Vital Signs-Reviewed the patient's vital signs. Records Reviewed: Nursing Notes    Procedures:  Procedures    Provider Notes (Medical Decision Making): Patient is tearful crying and the exam triage area that she is feeling overwhelmed. Her power of  nephew is with her.   He says this is longstanding multiple attempts at interventions with her brother have been made but then other family members intervened and patient is still distraught on medications not helping now having thoughts about not wanting to be here that life has no meaning and will have her medically cleared speak with crisis. Crisis is aware patient is here. 6:48 PM : Pt care transferred to [facility] provider. History of patient complaint(s), available diagnostic reports and current treatment plan has been discussed thoroughly. Bedside rounding on patient occured : yes . Intended disposition of patient :TBD  Pending diagnostics reports and/or labs (please list): crisis eval and dispo recommendation    MED RECONCILIATION:  No current facility-administered medications for this encounter. Current Outpatient Medications   Medication Sig    ARIPiprazole (ABILIFY) 5 mg tablet Take 5 mg by mouth daily.  sertraline (ZOLOFT) 100 mg tablet Take 100 mg by mouth daily.  diclofenac (VOLTAREN) 1 % gel Apply 4 g to affected area four (4) times daily.  amLODIPine (NORVASC) 10 mg tablet Take 10 mg by mouth daily.  aspirin 81 mg chewable tablet Take 81 mg by mouth daily. Disposition:  TBD      Follow-up Information    None         Current Discharge Medication List          Diagnosis     Clinical Impression:   1. Major depressive disorder with current active episode, unspecified depression episode severity, unspecified whether recurrent    2.  Passive suicidal ideations
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
## 2022-03-30 NOTE — ED NOTES
7:06 PM Assumed care of the pt at this time. Discussed with SITA Fernandez concerning patient Reanna Hammond, standard discussion of reason for visit, HPI, ROS, PE, and current results available. Recommendation for obtaining pending crisis evaluation and recommendation to dispo the pt. Raina Shah PA-C     10:13 PM pt seen and evaluated by crisis. Pt recommended for admission and has been accepted to the  Behavioral health unit. Raina Shah PA-C     Disposition: admitted    Dictation disclaimer:  Please note that this dictation was completed with [employer], the computer voice recognition software. Quite often unanticipated grammatical, syntax, homophones, and other interpretive errors are inadvertently transcribed by the computer software. Please disregard these errors. Please excuse any errors that have escaped final proofreading.
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
## 2022-03-31 PROBLEM — I10 HYPERTENSION: Status: ACTIVE | Noted: 2022-03-31

## 2022-03-31 PROBLEM — F32.2 MAJOR DEPRESSIVE DISORDER, SINGLE EPISODE, SEVERE WITH ANXIOUS DISTRESS (HCC): Status: ACTIVE | Noted: 2022-03-31

## 2022-03-31 LAB
ATRIAL RATE: 81 BPM
CALCULATED P AXIS, ECG09: 57 DEGREES
CALCULATED R AXIS, ECG10: 57 DEGREES
CALCULATED T AXIS, ECG11: 19 DEGREES
DIAGNOSIS, 93000: NORMAL
P-R INTERVAL, ECG05: 156 MS
Q-T INTERVAL, ECG07: 390 MS
QRS DURATION, ECG06: 98 MS
QTC CALCULATION (BEZET), ECG08: 453 MS
VENTRICULAR RATE, ECG03: 81 BPM

## 2022-03-31 PROCEDURE — 99222 1ST HOSP IP/OBS MODERATE 55: CPT | Performed by: PSYCHIATRY & NEUROLOGY

## 2022-03-31 PROCEDURE — 93005 ELECTROCARDIOGRAM TRACING: CPT

## 2022-03-31 PROCEDURE — 74011250637 HC RX REV CODE- 250/637: Performed by: PSYCHIATRY & NEUROLOGY

## 2022-03-31 PROCEDURE — 65220000003 HC RM SEMIPRIVATE PSYCH

## 2022-03-31 PROCEDURE — G0378 HOSPITAL OBSERVATION PER HR: HCPCS

## 2022-03-31 RX ORDER — ARIPIPRAZOLE 2 MG/1
2 TABLET ORAL
Status: DISCONTINUED | OUTPATIENT
Start: 2022-03-31 | End: 2022-04-11 | Stop reason: HOSPADM

## 2022-03-31 RX ORDER — SERTRALINE HYDROCHLORIDE 50 MG/1
100 TABLET, FILM COATED ORAL
Status: DISCONTINUED | OUTPATIENT
Start: 2022-03-31 | End: 2022-04-11 | Stop reason: HOSPADM

## 2022-03-31 RX ADMIN — AMLODIPINE BESYLATE 10 MG: 10 TABLET ORAL at 09:42

## 2022-03-31 RX ADMIN — SERTRALINE 100 MG: 50 TABLET, FILM COATED ORAL at 20:11

## 2022-03-31 RX ADMIN — ACETAMINOPHEN 500 MG: 500 TABLET ORAL at 00:43

## 2022-03-31 RX ADMIN — ARIPIPRAZOLE 2 MG: 2 TABLET ORAL at 20:10

## 2022-03-31 NOTE — ED NOTES
2210: First call attempt to give report, no answer. Charge RN made aware. 2215: Second call attempt to give report, no answer. Charge RN made aware. Nursing supervisor made aware, states she will try to get in touch with the unit. [phone]: Third call attempt to give report, no answer. Charge RN made aware.

## 2022-03-31 NOTE — BH NOTES
Pt admitted for depression with SI. Pt reports of being very stressful at home. Pt states she has been helping her older brother who has cisneros and other  medical complications. Pt states she was up most of the night helping her sick brother. She slept about 2 to 3 hrs nightly for sometime, she states \" I took more than I can handle. \" The situation made me feel so anxious and depressed. Pt is currently anxious and verbalized of feeling guilty because of her brother;s situation. Pt is alert and oriented x3. Pt coop with admission process. Pt's belongings checked and documented, patient rights given and she acknowledged understanding. Admission papers were signed, unit tour done. Patient encouraged to continue with use of non slip footwear to ambulate. Every 15 minutes rounding continues.

## 2022-03-31 NOTE — BH NOTES
Patient informed this nurse \"since all this stress started, I haven't really had an appetite. I would really like to have an appetite again. \"  Discussed with doctor and gave this nurse verbal order for consultation with nutritionist.

## 2022-03-31 NOTE — BH NOTES
Admission Note : [facility]. Pt is familiar with this facility , as she has been admitted here before. He nephew , Sylvia Decker , did come to the stairs to inquiry about routine unit policies. Mr. Kevin Gilbert states he has \" legal papers\" for his aunt but the exact papers are not with him. Pt. Is alert and oriented X 3. She states nephew is going to get some legal paperwork. It is reported that pt. Is voluntary. She was able to sign admission paper work. There is a large dark discolored area on her left arm which she describes as being there for a long time\". She is not a thorough historian. he reports hearing voices at times. She states she has hypertension and high cholesterol. She takes Norvasc, a fluid pill , and zoloft. Pt is stressed about her brother living with her. She does not elaborate about depressive symptoms. She states , \"Sometimes it feel like life isn't worth living. \" She has not planned nor done anything to hurt herself. RN'S will initiate ,develop ,implement ,review or revise treatment plan.

## 2022-03-31 NOTE — BH NOTES
Pt was admitted at approximately 2319. She appeared to have slept for 5 hours thus far. Pt cried out, in her sleep,  and rocked her body back and forth. This writer spoke to her softly to soothe her and she seemed to be able to relax. Pt nephew took her belongings with him upon her d/c from the E.D. This writer annotated his phone number for Pt; will give it to her upon awakening. \"He lives in [place] and he's going out of town this weekend. I'm not sure if he'll be able to come back this way to bring my things. I really need my glasses to see. I would like my shoes also. My sister lives in [facility] [facility], so she's too far away\". Will continue to monitor for safety and changes in behavior.

## 2022-03-31 NOTE — BSMART NOTE
ART THERAPY GROUP PROGRESS NOTE    Group time: 12:00    The patient refused group due to just arriving to the unit and wanted to rest.

## 2022-03-31 NOTE — BSMART NOTE
ART THERAPY GROUP PROGRESS NOTE    PATIENT SCHEDULED FOR GROUP AT: 9:45    ATTENDANCE: 1/2    PARTICIPATION LEVEL: Participates fully in the art process    ATTENTION LEVEL : Able to focus on task    FOCUS: Mindfulness    SYMBOLIC & THEMATIC CONTENT AS NOTED IN IMAGERY:  Pt was calm and kept to herself during the art process. She was invested in the art task. She came to the last 1/2 of group because she was finishing her breakfast. During group discussion she stated the positive affirmation she chose was \"stronger than yesterday\" because \"I was able to reach out for help to get here. I have only been here a day but I feel stronger than yesterday because I reached out for help. \"

## 2022-03-31 NOTE — H&P
[address] HISTORY AND PHYSICAL    Name:  Tato Grier  MR#:   [id number]  :  1950  ACCOUNT #:  [de-identified]  ADMIT DATE:  2022    IDENTIFYING INFORMATION:  The patient is a 76-year-old female admitted to this facility for the first time on the above-mentioned date. BASIS FOR ADMISSION:  The patient was brought into the facility by a nephew who had become increasingly concerned about the patient's depression, its intensity and also the presence of suicidal thoughts. The patient who was rather verbal during this initial evaluation with the undersigned described having problems dealing with one of her older sibling's medical problems, a brother that she had allowed to come into her home months ago, and that basically had made her life very unhappy because of his demanding ways and the continuous care that he requires to have which she was unable to do anymore, described becoming increasingly depressed and apparently having some thoughts of harming one of her nieces. When the patient was examined in the emergency room, she did indicate that she is being followed at [facility]. She has been prescribed with a combination of medications which have included prescriptions for sertraline 100 mg at bedtime and also was being prescribed with Abilify 5 mg daily; however, she never took the latter. This information provided was again somewhat different to the one that she gave at the emergency room last night. Regardless, when the case was presented to the undersigned with the patient being potentially self-harmful, taking into consideration her age and so increased chances for suicidal actions, the patient was suggested to be admitted. She accepted a voluntary admission and so the reason for this hospitalization.     PSYCHIATRIC HISTORY:  As indicated before, the patient began to take care of her brother, who is 76, after he had several different medical Patient calling to let dr Carlos office know that her son will be picking up the script for the tylenol 4       [advised dr Venegas is off on Fridays -- if this is not ready for  please call patient]    problems. Initially, her brother was staying in a seniors apartment; however, the patient lacked transportation or at least ways to visit with him on a regular basis, so she suggested for him to move in with her. This was the starting of worsening of her symptoms of depression, she said, because of how difficult it became to take care of her brother. He is described as an individual who has to have a Knapp catheter with a bag to be able to collect his urine, the specifics about it not clear; however, he was described by the patient as being very demanding and having to take care of him at multiple hours during the day and night. It is my understanding that as time progressed and her depression got worse, it appears that her brother's condition got also worse. This got up to a point in which she required to call \"an ambulance\" to pick him up and to take him back to the hospital.  Initially, he did not want to go; however, on a second attempt, he was taken. He stayed in the hospital for a while, and now, it appears that he was discharged to a hotel since the patient did mention to the caretakers in the hospital that she was not willing to take care of her brother anymore. As a result of this, her depression worsened and she became suicidal, she says. A nephew who had come to visit with her noted how bad she was feeling, and he was the one who brought her to the emergency room as indicated, so the basis again for this admission. MEDICAL HISTORY:  The patient sees Dr. Carlo Fan at [facility]. She was being prescribed with a combination of medications upon admission including aspirin 81 mg tablets once a day, Norvasc 10 mg daily, Voltaren 1% cream apply to affected areas four times a day, Zoloft 100 mg at bedtime and also Abilify 5 mg daily which she was not taking as I have above mentioned.   She has a history of cystitis, depression, dyslipidemia, hypertension, prior history of near-syncope and history of scalp contusion. The only surgery performed had been a colonoscopy with polypectomy performed by Dr. Vanesa Escobedo at [facility] on 01/27/2020. ALLERGIES:  THE PATIENT IS DESCRIBED TO HAVE ADVERSE REACTIONS VERSUS ALLERGIES TO ALEVE, BACTRIM, DIPHENHYDRAMINE, FELDENE AND PENICILLINS. THE ONLY ONE THAT PRODUCED A RASH WAS PENICILLINS. THE OTHERS WERE BACTRIM PRODUCED DIARRHEA, BENADRYL - DROWSINESS (EXPECTED), AND FELDENE - UNKNOWN REACTION. REVIEW OF SYSTEMS:  Psychiatric positive for dysphoric mood and suicidal ideas. The rest of the review of systems described as negative. Physical exam in the emergency room was that of a patient with a blood pressure of 163/88, pulse 110, respirations 18, temperature 37.5, oxygen saturation rate 97%. She was described as having a negative physical exam with exception of her psychiatric examination that showed the presence of sadness. DIAGNOSTIC DATA:  Multiple labs have been performed since the patient was admitted to the emergency room including a CBC with differential that showed basically normal test results. Urinalysis showed the presence of wbc's 4-10 with 2+ bacteria and 0-3 red cells, small amount of leukocyte esterase and negative nitrites. Complete metabolic panel showed a sodium 137, potassium 3.4, chloride 101, blood sugar 108, BUN 16, creatinine 0.69, estimated GFR above 60 mL/min and normal liver function tests noted. Lipase normal at 76. Troponin high sensitivity 4. Alcohol levels below 3. Urine drug screen negative. COVID rapid test showed negative result from a nasopharyngeal sample. Since then, multiple labs have been requested, specifically since the patient is going to be prescribed with an atypical antipsychotic, a lipid panel and A1c will be obtained. For completeness, a TSH and also an electrocardiogram are being obtained.   Due to the above-mentioned results with the UA, a urine culture has also been requested. ALCOHOL AND DRUG HISTORY:  This is negative. PERSONAL HISTORY AND FAMILY HISTORY:  The patient is the next to the youngest of her family of 9. One of her siblings is , she did not want to elaborate upon the cause of death. She described being  for a very short period of time at the age of 24. She never had any children. She has had other relationships, she said, but now she is by herself. When the patient was initially admitted, she described having homicidal thoughts towards her niece. This is someone in the family, she says, that has been trying to get her home and has made statements that are not only inappropriate but also false about her. So she is very angry with this person. She did mention that she had access to weapons, and this is going to be obviously investigated further. MENTAL STATUS EXAM:  This is a female who looks her stated age. During this evaluation, there was no evidence of alcohol or any other type of drug-related signs of intoxication or withdrawal symptoms. She is coherent, shows quality of continuity of associations without evidence of flight of ideas, pressure of speech, ideas of reference or influence or any hallucinations. During the evaluation, she described having some suicidal thoughts upon admission. She also is currently able, willing and capable to talk to the staff if unable to maintain self-control. She had some thoughts of harming her niece; however, she again knows that even though she had the thought, she never will do something like that. During the evaluation, she described helplessness and hopelessness, the same as anhedonia and lack of appetite. Not clear if she has experienced weight loss or not. Cognition is intact, and there is no evidence of a dementing process. Insight and judgment are currently appropriate, and the patient is considered to have capacity.     CLINICAL IMPRESSION:  AXIS I:  Major depressive disorder, single episode, without psychotic symptoms, severe. AXIS II:  Noncontributory. AXIS III:  Hypertensive cardiovascular disease by history. Dyslipidemia by history. Rule out urinary tract infection. History of adverse reactions versus allergies to Aleve, Bactrim, diphenhydramine, Feldene and penicillins. TREATMENT PLAN:  1. The patient has been admitted to the adult CD program, will be seen daily and will be referred to the groups within context of the program.  2.  She will have an assigned inpatient  to her care. 3.  The patient will be maintained on sertraline 100 mg daily to be given at bedtime and will be adding treatment with aripiprazole at lower dose of 2 mg at bedtime since the patient never took the prescribed 5 mg dose that she was given as an outpatient. She was concerned, she said, about \"description about increased suicidal thoughts\" described in the medication's pamphlets. So, during the session, we will reassure her that we will observe her very closely. In addition, the other side effects are present but not limited to weight gain, increased cholesterol and also developing diabetes mellitus. The patient was advised there are certain pills that need to be taken as a baseline, and she agreed to do so. As indicated above, we will proceed with obtaining a TSH, lipid panel, A1c and also an electrocardiogram, the same as an urine culture. ESTIMATED LENGTH OF STAY:  5 days. PROGNOSIS:  Fairly good with outpatient treatment compliance and response.       Jackie Lyles MD, LFAPA      [id number]  D:  03/31/2022 10:57  T:  03/31/2022 13:41  JOB #:  [id number]    Behavioral Services  Medicare Certification Upon Admission    I certify that this patient's inpatient psychiatric hospital admission is medically necessary for:      [x] Treatment which could reasonably be expected to improve this patient's condition,       [] For diagnostic study;     AND     [x] The inpatient psychiatric services are provided while the individual is under the care of a physician and are included in the individualized plan of care. Estimated length of stay/service is 5 days    Plan for post-hospital care will include outpatient psychiatric care and medical care.     Electronically signed by @JOSHUA on 4/1/2022 at 12:14 PM

## 2022-03-31 NOTE — PROGRESS NOTES
Problem: Falls - Risk of  Goal: *Absence of Falls  Description: Document Chana Litter Fall Risk and appropriate interventions in the flowsheet. Outcome: Progressing Towards Goal  Note: Fall Risk Interventions: Non-skid footwear when ambulating, keep room free from clutter  Medication Interventions: Teach patient to arise slowly       Problem: Suicide  Goal: *STG: Remains safe in hospital  Description: Pt. will be free from falls,self harm or harming others while in the hospital.  Outcome: Progressing Towards Goal     Patient has been cooperative and pleasant this shift. Patient attended all but one group during day shift, wanting to rest in room instead of afternoon art therapy. Patient has been compliant with scheduled medications and has not received PRN medications this shift. Patient's nephew dropped off clothing, glasses and shoes. Patient has interacted appropriately with staff and peers and has been free from falls and harm. Will continue to monitor and provide reassurance and interventions as appropriate.

## 2022-03-31 NOTE — BH NOTES
During morning medication administration, patient was informed she was receiving Amlodipine 10 mg tab. Patient stated \"what about my water pill? \"  Patient states she sees Dr Mega Berman at [facility] and has all prescriptions filled at [facility]. \"  Patient could not remember the name of medication.   With verbal permission from patient this nurse contacted the above pharmacy and received the following list of medications:    [facility] [phone]    · Abilify 5 mg tab, 1 tablet daily  · Sertraline 100 mg tablet at bedtime  · Amlodipine 10 mg, 1 tablet daily

## 2022-03-31 NOTE — ED NOTES
Pt arrived to rm 13, pt changed into blue scrubs and all belongings given to enrike Slaughter. Pt is AAOX3, ambulates with steady gait, resp even and unlabored on RA, sitter at bedside, pending bed assignment.

## 2022-03-31 NOTE — BSMART NOTE
Biopsychosocial Assessment    Current Level of Psychosocial Functioning     [x]Independent  []Dependent  []Minimal Assist      Comments:      Psychosocial High Risk Factors (check all that apply)      []Unable to obtain meds                                                               []Chronic illness/pain    []Substance abuse   []Lack of Family Support   [x]Financial stress   []Isolation   []Inadequate Community Resources  [x]Suicide attempt(s)  [x]Not taking medications   []Victim of crime   []Developmental Delay  []Unable to manage personal needs    [x]Age 72 or older   []  Homeless  []Damaris transportation   []Readmission within 30 days  []Unemployment  []Traumatic Event      Psychiatric Advanced Directive: None     Family to involve in treatment:  Pt. s has supportive family      Sexual Orientation:  Heterosexual     Patient Strengths:   pt. is cooperative and willing to seek help. Patient Barriers: Pt. has been off medications, dealing with family issues and has care giver syndrome     SA education provided:  Pt. denies use      Safety plan: FAYE discussed safety plan. Pt. contracts for safety. CMHC/MH history: Please refer to the psychiatrist and NP or PA note    AXIS I:  Major depressive disorder, single episode, without psychotic symptoms, severe. AXIS II:  Noncontributory. AXIS III:  Hypertensive cardiovascular disease by history. Dyslipidemia by history. Rule out urinary tract infection. History of adverse reactions versus allergies to Aleve, Bactrim, diphenhydramine, Feldene and penicillin.         Plan of Care: FAYE discussed and encouraged pt. to participate in the following activities: medication management, group/individual therapies, family meetings, psycho education, treatment team meetings to assist with stabilization     Initial Discharge Plan: 5 days    Clinical Summary: Pt 66year old female with history of Major depressive disorder, single episode, without psychotic symptoms, severe. Pt. was admitted to this facility for ideations to harm self. Pt. has been hospitalized at this facility in the past.  SW met with pt to discuss dc planning. Pt. states she has been having ideations to harm self since recent family issues regarding her brother. Pt states she allowed her 75-year-old brother to come and live with her , so she could help him with his care. Pt. states she did not realizes all the medical issues the pt had. Pt states when her brother moved in, the arrangements were okay . Pt. states he started becoming controlling and demanding. Pt. stated she found out she could not care for his medical needs such as cisneros Cather, bathing and medications needs. Pt. states her brother had to go to the hospital on two different occasions. Pt stated the last hospilztaion , she informed the hospital , her brother could not return to her home. Pt states her brother was discharged to a hotel. Pt stated she has conflict with her niece , the brothers daughter. The pt. feels as though the daughter is trying to take the home away from her. The pt. stated she has been so stressed ; she has contemplated suicide. Pt stated her nephew suggested for her to come to the hospital. SW discussed safety plan, coping strategies and benefits for out mental health counselling. Pt states she recently became re-enrolled with [employer]. Pt. is depressed and has fair insight. Pt contracts for safety and denies hallucinations. SW will provide pt with support towards dc planning.        Amparo Gray MA, JENISE-R

## 2022-03-31 NOTE — ED NOTES
Pt wanded and transferred to 130 by this RN and security. Pt is AAOX3, resp even and unlabored on RA, pt's nephew tookall of her belongings home including keys, purse and yellow ring.

## 2022-03-31 NOTE — H&P
Psychiatry History and Physical    Subjective:     Date of Evaluation:  3/31/2022    Reason for Referral:  Luisa Curtis was referred to the examiners from  for SI. History of Presenting Problem: Luisa Curtis is a 65 yo F with PMH HTN who was admitted for SI/depression. Denies SI and hallucinations. Tox screen, EtOH and Rapid COVID all negative. She reports some dizziness after taking Tylenol today for a headache. Patient Active Problem List    Diagnosis Date Noted    Major depressive disorder, single episode, severe with anxious distress ([address] 03/31/2022    Hypertension 03/31/2022     Past Medical History:   Diagnosis Date    Cystitis     Depression     High cholesterol     Hypertension     Near syncope 01/13/2020    Scalp contusion 01/17/2020     Past Surgical History:   Procedure Laterality Date    COLONOSCOPY N/A 1/27/2020    COLONOSCOPY with polypectomy performed by Efren Restrepo MD at [facility]       No family history on file. Social History     Tobacco Use    Smoking status: Never Smoker    Smokeless tobacco: Never Used   Substance Use Topics    Alcohol use: Not Currently     Prior to Admission medications    Medication Sig Start Date End Date Taking? Authorizing Provider   ARIPiprazole (ABILIFY) 5 mg tablet Take 5 mg by mouth daily. Yes Other, MD Kyleigh   sertraline (ZOLOFT) 100 mg tablet Take 100 mg by mouth daily. Yes Jennifer, MD Kyleigh   amLODIPine (NORVASC) 10 mg tablet Take 10 mg by mouth daily. Yes Other, MD Kyleigh   diclofenac (VOLTAREN) 1 % gel Apply 4 g to affected area four (4) times daily. 10/8/19   Shaye Poon PA-C   aspirin 81 mg chewable tablet Take 81 mg by mouth daily.     Other, MD Kyleigh     Allergies   Allergen Reactions    Aleve [Naproxen Sodium] Unable to Obtain    Bactrim [Sulfamethoprim] Diarrhea    Diphenhydramine Drowsiness     Patient states \"It makes me sleepy\"    Feldene [Piroxicam] Unknown (comments)     Shaking      Penicillins Rash        Review of Systems - History obtained from chart review and the patient  General ROS: negative  Psychological ROS: positive for - depression and suicidal ideation  Ophthalmic ROS: negative  ENT ROS: negative  Endocrine ROS: negative  Respiratory ROS: negative  Cardiovascular ROS: negative  Gastrointestinal ROS: negative  Musculoskeletal ROS: negative  Neurological ROS: positive for - dizziness  Dermatological ROS: negative      Objective:     Patient Vitals for the past 8 hrs:   BP Temp Pulse Resp   03/31/22 0937 138/81 97 °F (36.1 °C) 80 18       Mental Status exam: WNL except for    Sensorium  oriented to time, place and person   Orientation situation   Relations cooperative   Eye Contact appropriate   Appearance:  age appropriate   Motor Behavior:  within normal limits   Speech:  normal pitch and normal volume   Vocabulary average   Thought Process: goal directed   Thought Content free of delusions and free of hallucinations   Suicidal ideations intention   Homicidal ideations none   Mood:  stable   Affect:  stable   Memory recent  adequate   Memory remote:  adequate   Concentration:  adequate   Abstraction:  concrete   Insight:  fair   Reliability fair   Judgment:  fair         Physical Exam:   Visit Vitals  /81 (BP 1 Location: Left upper arm, BP Patient Position: Sitting)   Pulse 80   Temp 97 °F (36.1 °C)   Resp 18   Ht 5' 2.5\" (1.588 m)   Wt 65.8 kg (145 lb)   SpO2 97%   Breastfeeding No   BMI 26.10 kg/m²     General appearance: alert, cooperative, no distress, appears stated age  Head: Normocephalic, without obvious abnormality, atraumatic  Eyes: negative  Ears: compacted ear wax and external ear canals AU  Nose: Nares normal. Septum midline. Mucosa normal. No drainage or sinus tenderness.   Throat: Lips, mucosa, and tongue normal. Teeth and gums normal  Neck: supple, symmetrical, trachea midline and no adenopathy  Lungs: clear to auscultation bilaterally  Heart: regular rate and rhythm, S1, S2 normal, no murmur, click, rub or gallop  Abdomen: soft, non-tender. Extremities: extremities normal, atraumatic, no cyanosis or edema  Skin: Skin color, texture, turgor normal. No rashes or lesions  Neurologic: Grossly normal        Impression:      Principal Problem:    Major depressive disorder, single episode, severe with anxious distress ([address] (3/31/2022)    Active Problems:    Hypertension (3/31/2022)          Plan:     Recommendations for Treatment/Conditions:  Psychiatric treatment recommended while in hospital  Admit to inpatient psych for depression    Dizziness: continue to monitor, could be from compacted earwax, tylenol, blood pressure WNL    Referral To:    Inpatient psychiatric care        [place], [place]   3/31/2022 12:52 PM

## 2022-04-01 PROBLEM — E44.1 MILD PROTEIN-CALORIE MALNUTRITION (HCC): Status: ACTIVE | Noted: 2022-04-01

## 2022-04-01 LAB
CHOLEST SERPL-MCNC: 248 MG/DL
HBA1C MFR BLD: 6 % (ref 4.2–5.6)
HDLC SERPL-MCNC: 49 MG/DL (ref 40–60)
HDLC SERPL: 5.1 {RATIO} (ref 0–5)
LDLC SERPL CALC-MCNC: 176.4 MG/DL (ref 0–100)
LIPID PROFILE,FLP: ABNORMAL
TRIGL SERPL-MCNC: 113 MG/DL (ref ?–150)
TSH SERPL DL<=0.05 MIU/L-ACNC: 1.34 UIU/ML (ref 0.36–3.74)
VLDLC SERPL CALC-MCNC: 22.6 MG/DL

## 2022-04-01 PROCEDURE — 99232 SBSQ HOSP IP/OBS MODERATE 35: CPT | Performed by: PSYCHIATRY & NEUROLOGY

## 2022-04-01 PROCEDURE — 84443 ASSAY THYROID STIM HORMONE: CPT

## 2022-04-01 PROCEDURE — 74011250637 HC RX REV CODE- 250/637: Performed by: PSYCHIATRY & NEUROLOGY

## 2022-04-01 PROCEDURE — G0378 HOSPITAL OBSERVATION PER HR: HCPCS

## 2022-04-01 PROCEDURE — 65220000003 HC RM SEMIPRIVATE PSYCH

## 2022-04-01 PROCEDURE — 83036 HEMOGLOBIN GLYCOSYLATED A1C: CPT

## 2022-04-01 PROCEDURE — 36415 COLL VENOUS BLD VENIPUNCTURE: CPT

## 2022-04-01 PROCEDURE — 80061 LIPID PANEL: CPT

## 2022-04-01 RX ORDER — THERA TABS 400 MCG
1 TAB ORAL DAILY
Status: DISCONTINUED | OUTPATIENT
Start: 2022-04-02 | End: 2022-04-11 | Stop reason: HOSPADM

## 2022-04-01 RX ORDER — HYDROCHLOROTHIAZIDE 25 MG/1
25 TABLET ORAL DAILY
COMMUNITY
End: 2022-04-11

## 2022-04-01 RX ORDER — PRAVASTATIN SODIUM 40 MG/1
40 TABLET ORAL
COMMUNITY

## 2022-04-01 RX ADMIN — SERTRALINE 100 MG: 50 TABLET, FILM COATED ORAL at 20:49

## 2022-04-01 RX ADMIN — AMLODIPINE BESYLATE 10 MG: 10 TABLET ORAL at 08:41

## 2022-04-01 RX ADMIN — ARIPIPRAZOLE 2 MG: 2 TABLET ORAL at 20:48

## 2022-04-01 NOTE — PROGRESS NOTES
[address] [facility] [address]  Inpatient Progress Note     Date of Service: 04/01/22  Hospital Day: 2     Subjective/Interval History   04/01/22    Treatment Team Notes:  Notes reviewed and/or discussed and report that Deyanira Figueroa is a patient with a history of a major depressive disorder, recently admitted to the facility. Attention is invited to the admission note which is self-explanatory. Patient interview: Deyanira Figueroa was interviewed by this writer today. The patient remains depressed, described that she did not sleep well last night. However she denies any medications related side effects and specifically with current prescription of aripiprazole which was initiated yesterday. The patient was maintained on her previous prescription for sertraline, treatment with the atypical antipsychotic is only for antidepressant augmentation purposes. The patient herself is not psychotic.       Objective     Visit Vitals  /71 (BP 1 Location: Left upper arm, BP Patient Position: Sitting)   Pulse 72   Temp 97.9 °F (36.6 °C)   Resp 18   Ht 5' 2.5\" (1.588 m)   Wt 65.8 kg (145 lb)   SpO2 97%   Breastfeeding No   BMI 26.10 kg/m²     Vitals are stable    Recent Results (from the past 24 hour(s))   EKG, 12 LEAD, INITIAL    Collection Time: 03/31/22  1:15 PM   Result Value Ref Range    Ventricular Rate 81 BPM    Atrial Rate 81 BPM    P-R Interval 156 ms    QRS Duration 98 ms    Q-T Interval 390 ms    QTC Calculation (Bezet) 453 ms    Calculated P Axis 57 degrees    Calculated R Axis 57 degrees    Calculated T Axis 19 degrees    Diagnosis       Normal sinus rhythm  Nonspecific T wave abnormality  Abnormal ECG  When compared with ECG of 13-JAN-2020 18:24,  No significant change was found  Confirmed by Moose Vargas MD, ----- ([id number]) on 3/31/2022 1:47:39 PM     LIPID PANEL    Collection Time: 04/01/22  7:05 AM   Result Value Ref Range    LIPID PROFILE          Cholesterol, total 248 (H) <200 MG/DL Triglyceride 113 <150 MG/DL    HDL Cholesterol 49 40 - 60 MG/DL    LDL, calculated 176.4 (H) 0 - 100 MG/DL    VLDL, calculated 22.6 MG/DL    CHOL/HDL Ratio 5.1 (H) 0 - 5.0     TSH 3RD GENERATION    Collection Time: 04/01/22  7:05 AM   Result Value Ref Range    TSH 1.34 0.36 - 3.74 uIU/mL   HEMOGLOBIN A1C W/O EAG    Collection Time: 04/01/22  7:07 AM   Result Value Ref Range    Hemoglobin A1c 6.0 (H) 4.2 - 5.6 %     Above results noted,  results were discussed with the patient. Mental Status Examination     Appearance/Hygiene 66 y.o. BLACK/ female  Hygiene: Fair   Behavior/Social Relatedness Appropriate   Musculoskeletal Gait/Station: appropriate  Tone (flaccid, cogwheeling, spastic): not assessed  Psychomotor (hyperkinetic, hypokinetic): calm   Involuntary movements (tics, dyskinesias, akathisa, stereotypies): none   Speech   Rate, rhythm, volume, fluency and articulation are appropriate   Mood   depressed   Affect    congruent   Thought Process Linear and goal directed   Thought Content and Perceptual Disturbances Denies self-injurious behavior (SIB), suicidal ideation (SI), aggressive behavior or homicidal ideation (HI)    Denies auditory and visual hallucinations   Sensorium and Cognition  Grossly intact   Insight  improving slowly   Judgment  fair        Assessment/Plan      Psychiatric Diagnoses:   Patient Active Problem List   Diagnosis Code    Major depressive disorder, single episode, severe with anxious distress ([facility] [address] F32.2    Hypertension I10       Medical Diagnoses: Same    Psychosocial and contextual factors: Same    Level of impairment/disability: TBD     1. Medications will continue as currently prescribed. I will have the opportunity to see the patient over the weekend. We will continue to observe for any evidence of side effects, or adverse effects. May require an increase of aripiprazole to 5 mg at bedtime.   For now we will maintain with initial dose of 2 mg at bedtime. 2.  Reviewed instructions, risks, benefits and side effects of medications  3.   Disposition/Discharge Date: self-care/home, TBD    Lowell Renee MD, [address] [place] [address]

## 2022-04-01 NOTE — BH NOTES
During this shift (700am-330pm) pt has been isolated to room resting. Pt observed with depressed mood throughout shift. Pt has been meal compliant. Pt did attend a group today. Pt stated that she wasn't feeling well today but headache went away. No issues with pt. Will continue to monitor for safety.

## 2022-04-01 NOTE — BSMART NOTE
ART THERAPY GROUP PROGRESS NOTE    Group time: 10:00    The patient refused group despite encouragement.

## 2022-04-01 NOTE — GROUP NOTE
TIMOTHY  GROUP DOCUMENTATION INDIVIDUAL                                                                          Group Therapy Note    Date: 3/31/2022    Group Start Time: 7297  Group End Time: 4988  Group Topic: Nursing    [facility] ADULT CHEM DEP    Heena Nair., RN    [facility] GROUP DOCUMENTATION GROUP    Group Therapy Note: Importance of medication compliance    Attendees: 4         Attendance: Attended    Patient's Goal:  Understanding of high importance of medication compliance and attending outpatient appointments.     Interventions/techniques: Informed and Supported    Follows Directions: Followed directions    Interactions: Interacted appropriately    Mental Status: Depressed    Behavior/appearance: Attentive and Cooperative    Goals Achieved: Able to engage in interactions, Able to self-disclose and Identified feelings      Additional Notes:  Patient voiced feeling overwhelmed once brother moved into her home, not realizing the medical care he required. Patient also voiced \"I think it's time I start to downsize and get a little apartment or something that's easier to maintain. \"    Jossue Ceja.  Tiffany Vasquez

## 2022-04-01 NOTE — PROGRESS NOTES
Problem: Suicide  Goal: *STG: Remains safe in hospital  Description: Pt. will be free from falls,self harm or harming others while in the hospital.  Outcome: Progressing Towards Goal     Problem: Suicide  Goal: *STG/LTG: Complies with medication therapy  Description: Pt. will take his daily scheduled medications. Outcome: Progressing Towards Goal     Patient received phone call from one of her brothers this shift. Patient spoke with this nurse for a while after phone call, speaking of her home and not wanting to move but not wanting to be alone either. Patient voiced concern that one of her nieces \"is trying to get my house from me. \"  Patient tearful, not sobbing, when speaking of her home being the one she grew up in and was given to her upon her father's passing with \"some\" family members not agreeing with her being gifted the home. Patient stated \"he only did that because no one here was willing to come help him and my mama so I moved BACK here to care for them. \"  patient voiced trusting her nephew that brought her to hospital, stating he is the one she is willing to give POA to stating \"he's the only one that comes over and helps me; cuts the grass, paints, called in a claim to my insurance company for my roof. I give him money, but he only will take what he spent on supplies. I don't ask him, he just sees it needs to be done and he does it. This nurse reassured patient with \"well, it sounds like he wants to help you and cares about the environment you live in. That seems like a pretty unselfish motivator. \"  patient asked to call nephew, Manny Sports after talking with this nurse. Conversation appeared appropriate and patient voiced \"I feel a little better now, he locked up the house so my niece can't get in there. \"  Patient voiced feeling tired and opted to rest in room. Patient has eaten all meals and snacks and has been compliant with scheduled medications, has not received PRN medications this shift. Patient has been free from falls and harm. Will continue to monitor and provide reassurance and interventions as appropriate.

## 2022-04-01 NOTE — BSMART NOTE
ART THERAPY GROUP PROGRESS NOTE    PATIENT SCHEDULED FOR GROUP AT: 12:15    ATTENDANCE: 1/2    PARTICIPATION LEVEL: Participates fully in the art process    ATTENTION LEVEL : Able to focus on task    FOCUS: Setting Boundaries    SYMBOLIC & THEMATIC CONTENT AS NOTED IN IMAGERY:   Pt was calm and had a blunted affect. Her approach to the task was organized and invested. During group discussion she only spoke when spoken to saying she thought of \"family\" during the art process. She did not further explain.

## 2022-04-01 NOTE — BH NOTES
Pt reluctant to take hs medication. Pt educated on the purpose and side effects of the medication  And reassured that the hospital is the best place to take her medication because staff is able to monitor the affects and make adjustments. Pt took hs medication.   Will continue to support

## 2022-04-01 NOTE — CONSULTS
Comprehensive Nutrition Assessment    Type and Reason for Visit: Initial,Consult    Nutrition Recommendations/Plan:   - Add oral supplement to optimize nutrition intake: Ensure Enlive BID, chocolate. - Add daily MVI    Nutrition Assessment:  Tolerating diet with good meal intake. Pt with decreased appetite PTA and significant weight loss per reported usual weight of 9% x 3 months. Agreeable to Ensure Enlive to optimize nutrition intake 2/2 inadequate intake PTA. Malnutrition Assessment:  Malnutrition Status:  Mild malnutrition    Context:  Social/environmental circumstances     Findings of the 6 clinical characteristics of malnutrition:   Energy Intake:  Mild decrease in energy intake (specify) (slight decrease in meal intake, but began consuming supplements)  Weight Loss:  7.0 - Greater than 7.5% over 3 months     Body Fat Loss:  No significant body fat loss,     Muscle Mass Loss:  No significant muscle mass loss,    Fluid Accumulation:  No significant fluid accumulation,     Strength:  Not performed     Nutrition History and Allergies: Pertinent PMH: HTN, HLD, depression. Presented with concern for SI and depression. NKFA. Pt endorses poor appetite and meal intake that steadily declined as she took care of her brother. Started consuming Ensure supplements PTA. States UBW is 160 lb and she last weighed that 3 months ago.      Estimated Daily Nutrient Needs:  Energy (kcal): 6166-9225; Weight Used for Energy Requirements: Current  Protein (g): 53-66; Weight Used for Protein Requirements: Current (0.8-1)  Fluid (ml/day): 5954-3493; Method Used for Fluid Requirements: 1 ml/kcal      Nutrition Related Findings:  no pertinent findings      Wounds:    None       Current Nutrition Therapies:  ADULT DIET Regular    Anthropometric Measures:  · Height:  5' 2.5\" (158.8 cm)  · Current Body Wt:  65.8 kg (145 lb 1 oz)   · Admission Body Wt:  162 lb 0.6 oz    · Usual Body Wt:  72.6 kg (160 lb)     · Ideal Body Wt:  113 lbs:  128.4 %   · Adjusted Body Weight:   ; Weight Adjustment for: No adjustment   · Adjusted BMI:       · BMI Category: Overweight (BMI 25.0-29. 9)       Nutrition Diagnosis:   · Unintended weight loss related to psychological cause or life stress as evidenced by weight loss 7.5% in 3 months,poor intake prior to admission      Nutrition Interventions:   Food and/or Nutrient Delivery: Continue current diet,Start oral nutrition supplement,Vitamin supplement  Nutrition Education and Counseling: Education not indicated  Coordination of Nutrition Care: Continue to monitor while inpatient    Goals:  PO nutrition intake will meet >75% of patient estimated nutritional needs by next follow up date.        Nutrition Monitoring and Evaluation:   Behavioral-Environmental Outcomes: None identified  Food/Nutrient Intake Outcomes: Food and nutrient intake,Supplement intake,Vitamin/mineral intake  Physical Signs/Symptoms Outcomes: Meal time behavior,Nutrition focused physical findings    Discharge Planning:    No discharge needs at this time     Electronically signed by Javier Leon RD on 4/1/2022 at 1:16 PM    Contact: [phone]

## 2022-04-01 NOTE — BH NOTES
Patient was sitting in the day area when staff arrived on the unit. Patient ask staff if she could talk and staff talked to patient. Patient told staff that she was in here  because , she let her brother moved in with she and now she regrets him living there. Patient stated the \" He gets on my nerves\". Walking and getting up all times night. Patient stated that brother was hyper [employer]. Patient stated that brother will be hollering \"pray\" all the time. Patient stated \" I am glad my nephew took him away\". Patient appeared to be teary eyed doing her conversation. Patient deciding on to sell the house and live [facility]. Patient appear to be anxious and worried. Patient ate dinner and had snack. Patient tok her nighttime medications. Patient will be monitored for safety.

## 2022-04-01 NOTE — BSMART NOTE
Pt 74-year-old female with history of Major depressive disorder, single episode, without psychotic symptoms, severe. Pt. was admitted to this facility for ideations to harm self. Pt. has been hospitalized at this facility in the past.      Pt.s case was discussed  in staffing . Pt. continues to be depressed. Pts medication is being adjusted. SW Contact:  SW met with pt to discuss dc planning. Pt. expressed she was feeling sleepy. Pt states slept was an issue for her las night. Pt. reflected on stressors that led her  to the hospital.   SW discussed coping strategies, self-care , safety plan and aftercare. Pt. is depressed, hopeless  and tearful. Pt. contracts for safety and denies hallucinations. SW will continue to provide pt with support toward dc planning.          Rachael Banerjee MA, LMHP-R

## 2022-04-02 PROCEDURE — 74011250637 HC RX REV CODE- 250/637: Performed by: PSYCHIATRY & NEUROLOGY

## 2022-04-02 PROCEDURE — 99231 SBSQ HOSP IP/OBS SF/LOW 25: CPT | Performed by: PSYCHIATRY & NEUROLOGY

## 2022-04-02 PROCEDURE — G0378 HOSPITAL OBSERVATION PER HR: HCPCS

## 2022-04-02 PROCEDURE — 65220000003 HC RM SEMIPRIVATE PSYCH

## 2022-04-02 RX ORDER — LOPERAMIDE HYDROCHLORIDE 2 MG/1
2 CAPSULE ORAL
Status: DISCONTINUED | OUTPATIENT
Start: 2022-04-02 | End: 2022-04-11 | Stop reason: HOSPADM

## 2022-04-02 RX ADMIN — THERA TABS 1 TABLET: TAB at 07:59

## 2022-04-02 RX ADMIN — AMLODIPINE BESYLATE 10 MG: 10 TABLET ORAL at 07:59

## 2022-04-02 RX ADMIN — ARIPIPRAZOLE 2 MG: 2 TABLET ORAL at 20:06

## 2022-04-02 RX ADMIN — LOPERAMIDE HYDROCHLORIDE 2 MG: 2 CAPSULE ORAL at 20:06

## 2022-04-02 RX ADMIN — SERTRALINE 100 MG: 50 TABLET, FILM COATED ORAL at 20:06

## 2022-04-02 NOTE — PROGRESS NOTES
Problem: Suicide  Goal: *STG: Remains safe in hospital  Description: Pt. will be free from falls,self harm or harming others while in the hospital.  Outcome: Progressing Towards Goal  Goal: *STG: Attends activities and groups  Description: Pt. will attend at least 2 x day to his daily activities and groups. Outcome: Progressing Towards Goal  Goal: *STG/LTG: Complies with medication therapy  Description: Pt. will take his daily scheduled medications. Outcome: Progressing Towards Goal     Problem: Falls - Risk of  Goal: *Absence of Falls  Description: Document Justin Hdz Fall Risk and appropriate interventions in the flowsheet. Outcome: Progressing Towards Goal  Note: Fall Risk Interventions:            Medication Interventions: Teach patient to arise slowly       Patient is pleasant, affect is flat, mood is calm. She denies SI. States she slept well last night. Medication compliant. Voiced no complaints.

## 2022-04-02 NOTE — PROGRESS NOTES
[address] [facility] [address]  Inpatient Progress Note     Date of Service: 04/02/22  Hospital Day: 3     Subjective/Interval History   04/02/22    Treatment Team Notes:  Notes reviewed and/or discussed and report that Sacha Medel is a patient with a history of depression recently admitted to the facility. Please be referred to the dictated admission note which is self-explanatory. Patient interview: Sacha Medel was interviewed by this writer today. The patient described being able to sleep better last night. Otherwise she remains depressed and withdrawn at times. Activity level remains rather low. There is no major evidence of side effects associated to current combination of aripiprazole 2 mg daily and sertraline 100 mg daily both of them giving at bedtime. Objective     Visit Vitals  /73 (BP 1 Location: Left upper arm, BP Patient Position: Sitting)   Pulse 64   Temp 97.5 °F (36.4 °C)   Resp 16   Ht 5' 2.5\" (1.588 m)   Wt 65.8 kg (145 lb)   SpO2 97%   Breastfeeding No   BMI 26.10 kg/m²     Blood pressure is a stable the same as the rest of the vital signs    No results found for this or any previous visit (from the past 24 hour(s)). Mental Status Examination     Appearance/Hygiene 66 y.o. BLACK/ female  Hygiene: Fair   Behavior/Social Relatedness  withdrawn   Musculoskeletal Gait/Station: appropriate  Tone (flaccid, cogwheeling, spastic): not assessed  Psychomotor (hyperkinetic, hypokinetic): calm   Involuntary movements (tics, dyskinesias, akathisa, stereotypies): none   Speech   Rate, rhythm, volume, fluency and articulation are appropriate   Mood   depressed   Affect    mildly irritable   Thought Process Linear and goal directed   Thought Content and Perceptual Disturbances  suicidal thoughts are improving, ideas of reference or influence,  Denies auditory and visual hallucinations however remains helpless and hopeless.    Sensorium and Cognition Grossly intact   Insight  improving slowly. Judgment  fair        Assessment/Plan      Psychiatric Diagnoses:   Patient Active Problem List   Diagnosis Code    Major depressive disorder, single episode, severe with anxious distress ([facility] [address] F32.2    Hypertension I10    Mild protein-calorie malnutrition ([facility] [address] E44.1       Medical Diagnoses: Same    Psychosocial and contextual factors: Same    Level of impairment/disability: TBD      1. The consultation by one of our nutritionist is greatly appreciated. Attention is invited to her note which is self-explanatory. Otherwise treatment with the same combinations of medications will follow. A multivitamin has been added to the patient's treatment regimen. 2.  Reviewed instructions, risks, benefits and side effects of medications  3. Disposition/Discharge Date: self-care/home, TBD.     Daniel Conway MD, [address] [place] [address]

## 2022-04-03 PROCEDURE — 74011250637 HC RX REV CODE- 250/637: Performed by: PSYCHIATRY & NEUROLOGY

## 2022-04-03 PROCEDURE — 99231 SBSQ HOSP IP/OBS SF/LOW 25: CPT | Performed by: PSYCHIATRY & NEUROLOGY

## 2022-04-03 PROCEDURE — G0378 HOSPITAL OBSERVATION PER HR: HCPCS

## 2022-04-03 PROCEDURE — 65220000003 HC RM SEMIPRIVATE PSYCH

## 2022-04-03 RX ADMIN — THERA TABS 1 TABLET: TAB at 07:57

## 2022-04-03 RX ADMIN — ARIPIPRAZOLE 2 MG: 2 TABLET ORAL at 20:06

## 2022-04-03 RX ADMIN — AMLODIPINE BESYLATE 10 MG: 10 TABLET ORAL at 07:57

## 2022-04-03 RX ADMIN — SERTRALINE 100 MG: 50 TABLET, FILM COATED ORAL at 20:06

## 2022-04-03 NOTE — GROUP NOTE
[facility]  GROUP DOCUMENTATION INDIVIDUAL                                                                          Group Therapy Note    Date: 4/2/2022    Group Start Time: 2100  Group End Time: 2130  Group Topic: Medication    [address] PEDS BEHAV [facility]TH    Queenie Valdes    IP [address] [facility] GROUP DOCUMENTATION GROUP    Group Therapy Note    Attendees: 4         Attendance: Attended    Patient's Goal:  Comprehend importance of medication compliance    Interventions/techniques: Informed    Follows Directions:  Followed directions    Interactions: Interacted appropriately    Mental Status: Calm    Behavior/appearance: Cooperative    Goals Achieved: Able to listen to others      Additional Notes:  Took hs scheduled medication    Alvaro Sears

## 2022-04-03 NOTE — PROGRESS NOTES
[address]  Inpatient Progress Note     Date of Service: 04/03/22  Hospital Day: 4     Subjective/Interval History   04/03/22    Treatment Team Notes:  Notes reviewed and/or discussed and report that Kevin Britt is a patient with a history of depression recently admitted to the hospital.  Attention is  invited to the dictated admission note which is self-explanatory. Patient interview: Kevin Britt was interviewed by this writer today. During psych rounds the patient was asleep. She has begun to sleep a little better however the decision was made not to wake the patient up. Nursing staff described that the patient is tolerating medications very well, and that she is attending groups very appropriately. We will see again in the morning. Objective     Visit Vitals  /75 (BP 1 Location: Left upper arm, BP Patient Position: Sitting)   Pulse 63   Temp 97.3 °F (36.3 °C)   Resp 16   Ht 5' 2.5\" (1.588 m)   Wt 65.8 kg (145 lb)   SpO2 97%   Breastfeeding No   BMI 26.10 kg/m²     Vitals are stable    Mental Status Examination     Appearance/Hygiene 66 y.o.  BLACK/ female  Hygiene: Fair   Behavior/Social Relatedness  withdrawn at times   Musculoskeletal Gait/Station: appropriate  Tone (flaccid, cogwheeling, spastic): not assessed  Psychomotor (hyperkinetic, hypokinetic): calm   Involuntary movements (tics, dyskinesias, akathisa, stereotypies): none   Speech   Rate, rhythm, volume, fluency and articulation are appropriate   Mood   depressed   Affect    congruent   Thought Process Linear and goal directed   Thought Content and Perceptual Disturbances  suicidal thoughts are improving per description with nursing staff    Denies auditory and visual hallucinations   Sensorium and Cognition  Grossly intact   Insight  improving   Judgment  improving        Assessment/Plan      Psychiatric Diagnoses:   Patient Active Problem List   Diagnosis Code    Major depressive disorder, single episode, severe with anxious distress ([facility]) F32.2    Hypertension I10    Mild protein-calorie malnutrition ([facility] [address] E44.1       Medical Diagnoses: Same    Psychosocial and contextual factors: Same    Level of impairment/disability:  TBD    1. We will continue with current prescriptions the same. The patient is tolerating her current dose of aripiprazole 2 mg at bedtime. Sertraline was maintained the same. 2.  Reviewed instructions, risks, benefits and side effects of medications  3. Disposition/Discharge Date: self-care/home, sometime next week.     Meagan Lucas MD, [address] [facility]  Psychiatry

## 2022-04-03 NOTE — BSMART NOTE
ART THERAPY INDIVIDUAL PROGRESS NOTE    PATIENT SCHEDULED FOR INDIVIDUAL AT: 12:00    ATTENDANCE: Full    PARTICIPATION LEVEL: Participates fully in the art process    ATTENTION LEVEL : Able to focus on task    FOCUS: Individual Session    SYMBOLIC & THEMATIC CONTENT AS NOTED IN IMAGERY:  Pt was calm and had a blunted affect. She was invested in the art task. Her approach was disorganized at first even with showing an example. She spoke about symptoms of depression she has noticed such as \"feeling low, not being interested in watching television like before, and not being a cheery person anymore. \" She spoke a little about a situation with her brother but did not want to go into detail. This writer asked if she has told her doctor at least and she stated \"yes. \" Her affect brightened when asked if the colors she picked reminded her of anything. She stated \"I used to wear some of these colors to [employer]. I really like white, purple, red, and leopard print. I also like wearing dresses with hats to [employer]. \" She was able to complete the art task overall.

## 2022-04-03 NOTE — GROUP NOTE
[address] [facility]  GROUP DOCUMENTATION INDIVIDUAL                                                                          Group Therapy Note    Date: 4/3/2022    Group Start Time: 0830  Group End Time: 0845  Group Topic: Nursing    [facility] PEDS BE[facility] [place]    Mary Rush RN    IP [address] [facility] GROUP DOCUMENTATION GROUP    Group Therapy Note    Attendees: 2         Attendance: Attended    Patient's Goal:  Self care    Interventions/techniques: Informed    Follows Directions:  Followed directions    Interactions: Interacted appropriately    Mental Status: Calm    Behavior/appearance: Cooperative    Goals Achieved: Able to listen to others        Janis Echeverria RN

## 2022-04-03 NOTE — BH NOTES
Presents depressed w/ dull affect. Pt isolative but does interact with peers and staff occasionally. Talked to her brother on the phone. Ate her dinner and scheduled medications. Pt did c/o of diarrhea and was ordered Immodium 2 mg po every 6 hours as needed, and she was administered a dose. Offers no c/o si/hi or avh.

## 2022-04-03 NOTE — PROGRESS NOTES
Problem: Suicide  Goal: *STG: Remains safe in hospital  Description: Pt. will be free from falls,self harm or harming others while in the hospital.  Outcome: Progressing Towards Goal  Goal: *STG: Attends activities and groups  Description: Pt. will attend at least 2 x day to his daily activities and groups. Outcome: Progressing Towards Goal  Goal: *STG/LTG: Complies with medication therapy  Description: Pt. will take his daily scheduled medications. Outcome: Progressing Towards Goal     Problem: Falls - Risk of  Goal: *Absence of Falls  Description: Document Pacofilomenachuchosandy Chery Fall Risk and appropriate interventions in the flowsheet. Outcome: Progressing Towards Goal  Note: Fall Risk Interventions:            Medication Interventions: Teach patient to arise slowly       Patient is pleasant this morning, she is medication compliant. Denies SI. Patient attended morning group. Discuss self care and setting boundaries with family . Positive coping skills , talked about our strengths and weaknesses. Will continue to provide a safe environment.

## 2022-04-03 NOTE — BSMART NOTE
ART THERAPY GROUP PROGRESS NOTE    Group time: 9:45    The patient refused group despite encouragement.

## 2022-04-04 PROCEDURE — G0378 HOSPITAL OBSERVATION PER HR: HCPCS

## 2022-04-04 PROCEDURE — 99232 SBSQ HOSP IP/OBS MODERATE 35: CPT | Performed by: PSYCHIATRY & NEUROLOGY

## 2022-04-04 PROCEDURE — 74011250637 HC RX REV CODE- 250/637: Performed by: PSYCHIATRY & NEUROLOGY

## 2022-04-04 PROCEDURE — 65220000003 HC RM SEMIPRIVATE PSYCH

## 2022-04-04 RX ADMIN — ARIPIPRAZOLE 2 MG: 2 TABLET ORAL at 21:00

## 2022-04-04 RX ADMIN — THERA TABS 1 TABLET: TAB at 08:14

## 2022-04-04 RX ADMIN — AMLODIPINE BESYLATE 10 MG: 10 TABLET ORAL at 08:14

## 2022-04-04 RX ADMIN — SERTRALINE 100 MG: 50 TABLET, FILM COATED ORAL at 20:22

## 2022-04-04 NOTE — BSMART NOTE
ART THERAPY GROUP PROGRESS NOTE    PATIENT SCHEDULED FOR GROUP AT: 12:00    ATTENDANCE: Full    PARTICIPATION LEVEL: Participates fully in the art process    ATTENTION LEVEL : Able to focus on task minimally     FOCUS: Balance    SYMBOLIC & THEMATIC CONTENT AS NOTED IN IMAGERY:   Pt was calm and had a blunted affect. She was able to focus on the art task. Pt completed the art portion and was not able to complete the writing portion stating, quote: \"I read the questions but I did not answer them. Those are hard questions. \" During group discussion she stated what bring her stress is \"having questions\" and what brings her peace is \"family and reading the bible. \" When asked about what questions had she only stated, quote: \"I am going through a lot and I don't want to talk about it right now. \"

## 2022-04-04 NOTE — PROGRESS NOTES
[address]  Inpatient Progress Note     Date of Service: 04/04/22  Hospital Day: 5     Subjective/Interval History   04/04/22    Treatment Team Notes:  Notes reviewed and/or discussed and report that Jazmín Aaron is a patient with a history of depression, attention invited to the dictated admission note which is self-explanatory. Patient interview: Jazmín Aaron was interviewed by this writer today. The patient remains depressed. During our session today she continues to describe feeling helpless and hopeless with some guilt feelings also being brought up associated to her not allowing her brother to come back to her home. Intellectually she is very well aware that she could not take care of her brother, however emotionally, she described feeling guilty about it. That obviously does not help with her symptoms of depression, and so another recently she was suggested to increase his group therapy participation. She was advised again that combination of therapy and medications will provide her with the best treatment approach. The patient understood to our concerns. Objective     Visit Vitals  BP (!) 150/72 (BP 1 Location: Left upper arm, BP Patient Position: At rest)   Pulse 70   Temp 97.3 °F (36.3 °C)   Resp 18   Ht 5' 2.5\" (1.588 m)   Wt 65.8 kg (145 lb)   SpO2 97%   Breastfeeding No   BMI 26.10 kg/m²     Blood pressure remains elevated of unknown. If that continues we may have to add treatment with lisinopril. No results found for this or any previous visit (from the past 24 hour(s)). Mental Status Examination     Appearance/Hygiene 66 y.o.  BLACK/ female  Hygiene: Fair   Behavior/Social Relatedness  withdrawn   Musculoskeletal Gait/Station: appropriate  Tone (flaccid, cogwheeling, spastic): not assessed  Psychomotor (hyperkinetic, hypokinetic): calm   Involuntary movements (tics, dyskinesias, akathisa, stereotypies): none   Speech   Rate, rhythm, volume, fluency and articulation are appropriate   Mood   depressed   Affect    flat   Thought Process Linear and goal directed   Thought Content and Perceptual Disturbances Denies self-injurious behavior (SIB), suicidal ideation (SI), aggressive behavior or homicidal ideation (HI)  Denies auditory and visual hallucinations   Sensorium and Cognition  Grossly intact   Insight  improving slowly   Judgment  fair        Assessment/Plan      Psychiatric Diagnoses:   Patient Active Problem List   Diagnosis Code    Major depressive disorder, single episode, severe with anxious distress ([facility] [address] F32.2    Hypertension I10    Mild protein-calorie malnutrition ([facility] [address] E44.1       Medical Diagnoses: Same    Psychosocial and contextual factors: Same    Level of impairment/disability: TBD.      1.  We will continue with him combination of sertraline 100 mg at bedtime, Abilify 2 mg at bedtime. Norvasc has been maintained at 10 mg daily, however if this is not sufficient to maintain the patient's blood pressure under control, will treatment with lisinopril. 2.  Reviewed instructions, risks, benefits and side effects of medications  3.   Disposition/Discharge Date: self-care/home, TBD    Zohreh Frye MD, [address] [place] [address]  Psychiatry

## 2022-04-04 NOTE — BH NOTES
Patient was in day area when staff arrived on the unit. Patient appeared to have a sad affect. Patient stated she \"felt better after talking to the . Patient took nighttime medications. Patent will be monitored fo safety.

## 2022-04-04 NOTE — BSMART NOTE
ART THERAPY GROUP PROGRESS NOTE    Group time: 9:45    The patient initially stated she might come and then came to the last 5 minutes of group. She was able to only start her artwork.

## 2022-04-04 NOTE — PROGRESS NOTES
Patient observed on the unit interacting well with staff and peers. He consumed all meals and tolerated them well. Patient compliant with all mediations and no adverse reactions noted. Will continue to monitor for medication side effects and safety.

## 2022-04-04 NOTE — GROUP NOTE
IP  GROUP DOCUMENTATION INDIVIDUAL                                                                          Group Therapy Note    Date: 4/3/2022    Group Start Time: 2000  Group End Time: 2030  Group Topic: Medication    [facility] 1 ADULT CHEM DEP    Kristina Pizarro, LUÍS    IP [facility] GROUP DOCUMENTATION GROUP    Group Therapy Note    Attendees: 6         Attendance: Attended    Patient's Goal:  Understanding the importance of maintaining medication compliance. Interventions/techniques: Informed    Follows Directions: Followed directions    Interactions: Interacted appropriately    Mental Status: Flat    Behavior/appearance: Cooperative    Goals Achieved: Able to receive feedback      Additional Notes:  Pt has been isolated to self resting in bed. Verbalized understanding of maintaining medication compliance. Will continue to monitor and support as needed.     Amanda Guadalupe RN

## 2022-04-04 NOTE — BSMART NOTE
Pt 59-year-old female with history of Major depressive disorder, single episode, without psychotic symptoms, severe. Pt. was admitted to this facility for ideations to harm self. Pt. has been hospitalized at this facility in the past.    Pt.s case was discussed in treatment team. Pt.'s sleep is improving but continues so be depressed. Pt. Requires continued stabilization. SW Contact:  SW met with pt. to discuss dc planning. Pt. Expressed she slept better . Pt. Expressed feeling anxious about her financial issues bills, trying to unkept her home and family conflict regarding her niece and brother. SW provided pt with support discussed safety plan,  conflict resolution and safety plan. pt appears anxious and insight is slowly improving. SW will continue to provide pt with support towards dc planning.       Beverly Levy MA, LMHP-R

## 2022-04-04 NOTE — PROGRESS NOTES
Problem: Suicide  Goal: *STG: Attends activities and groups  Description: Pt. will attend at least 2 x day to his daily activities and groups. Outcome: Progressing Towards Goal     Problem: Suicide  Goal: *STG/LTG: Complies with medication therapy  Description: Pt. will take his daily scheduled medications. Outcome: Progressing Towards Goal     Patient continues to appear with sad affect but has attended all groups this shift. Patient has eaten all meals and has been compliant with scheduled medications, has not required PRN medications this shift. Patient informed this nurse of conversation with  and voiced feelings of hope that she and brother might be able to move into same \"adult community\" if he's interested. Patient reassured that Harvinder Latvian is never easy, but it's what's necessary sometimes. \"  patient voiced agreement and used phone to call family member. Patient has been free from falls and harm, will continue to monitor and provide interventions as appropriate.

## 2022-04-05 PROCEDURE — 74011250637 HC RX REV CODE- 250/637: Performed by: PSYCHIATRY & NEUROLOGY

## 2022-04-05 PROCEDURE — 65220000003 HC RM SEMIPRIVATE PSYCH

## 2022-04-05 PROCEDURE — G0378 HOSPITAL OBSERVATION PER HR: HCPCS

## 2022-04-05 PROCEDURE — 99231 SBSQ HOSP IP/OBS SF/LOW 25: CPT | Performed by: PSYCHIATRY & NEUROLOGY

## 2022-04-05 RX ADMIN — SERTRALINE 100 MG: 50 TABLET, FILM COATED ORAL at 20:00

## 2022-04-05 RX ADMIN — AMLODIPINE BESYLATE 10 MG: 10 TABLET ORAL at 07:36

## 2022-04-05 RX ADMIN — THERA TABS 1 TABLET: TAB at 07:36

## 2022-04-05 RX ADMIN — ARIPIPRAZOLE 2 MG: 2 TABLET ORAL at 20:00

## 2022-04-05 NOTE — PROGRESS NOTES
Problem: Suicide  Goal: *STG: Remains safe in hospital  Description: Pt. will be free from falls,self harm or harming others while in the hospital.  Outcome: Progressing Towards Goal  Goal: *STG: Attends activities and groups  Description: Pt. will attend at least 2 x day to his daily activities and groups. Outcome: Progressing Towards Goal  Goal: *STG/LTG: Complies with medication therapy  Description: Pt. will take his daily scheduled medications. Outcome: Progressing Towards Goal     Problem: Falls - Risk of  Goal: *Absence of Falls  Description: Document Collins Blades Fall Risk and appropriate interventions in the flowsheet. Outcome: Progressing Towards Goal  Note: Fall Risk Interventions:            Medication Interventions: Teach patient to arise slowly     Patient states she is feeling better. Medication compliant. Denies SI. Encouraged to attend her groups. Voiced no complaints.

## 2022-04-05 NOTE — BSMART NOTE
Pt 25-year-old female with history of Major depressive disorder, single episode, without psychotic symptoms, severe. Pt. was admitted to this facility for ideations to harm self. Pt. has been hospitalized at this facility in the past.     Pt.s case was discussed in staffing. Pt. is showing some improvement. .      SW Contact:  SW met with pt. to discuss dc planning. Pt. expressed feeling emotionally upset wither niece. SW allowed pt to vent. SW discussed positive conflict resolution conflict resolution and safety plan. Pt. appears anxious and insight is improving. SW will continue to provide pt with support towards dc planning.         Marylee Auer MA, LMHP-R

## 2022-04-05 NOTE — BSMART NOTE
Social Work Group 4/5/22   Coping strategies- Mindfulness exercise   Attendance attended   Number of participants 4   Time in 1:40   Time out 2:30   Total Time 50   Interaction Participated in activity, reflected on some coping strategies   Interventions/  techniques Informed, supported  and encouraged         Beverly Levy MA, LMHP-R

## 2022-04-05 NOTE — PROGRESS NOTES
Behavioral Health Progress Note    Admit Date: 3/30/2022      Vitals : Patient Vitals for the past 8 hrs:   BP Temp Pulse Resp   04/05/22 0806 137/81 97.5 °F (36.4 °C) 70 16     Labs:  No results found for this or any previous visit (from the past 24 hour(s)). Meds:   Current Facility-Administered Medications   Medication Dose Route Frequency    loperamide (IMODIUM) capsule 2 mg  2 mg Oral Q6H PRN    therapeutic multivitamin (THERAGRAN) tablet 1 Tablet  1 Tablet Oral DAILY    sertraline (ZOLOFT) tablet 100 mg  100 mg Oral QHS    ARIPiprazole (ABILIFY) tablet 2 mg  2 mg Oral QHS    traZODone (DESYREL) tablet 25 mg  25 mg Oral QHS PRN    hydrOXYzine pamoate (VISTARIL) capsule 25 mg  25 mg Oral Q6H PRN    amLODIPine (NORVASC) tablet 10 mg  10 mg Oral DAILY    acetaminophen (TYLENOL) tablet 500 mg  500 mg Oral Q6H PRN      Hospital Problems: Principal Problem:    Major depressive disorder, single episode, severe with anxious distress ([facility] [address] (3/31/2022)    Active Problems:    Hypertension (3/31/2022)      Mild protein-calorie malnutrition ([facility]) (4/1/2022)        Subjective:   Medication side effects: insomnia  somnolence, day fatigue with poor night sleep    Mental Status Exam  Sensorium: alert  Orientation: only aware of  time, place and person  Relations: guarded  Eye Contact: poor  Appearance: is bizarre  Thought Process: slow rate of thoughts and fair abstract reasoning/computation   Thought Content: no evidence of impairment   Suicidal: ideas   Homicidal: ideas of harming a relative who is not around here   Mood: is irritable   Affect: anxious  Memory: shows no evidence of impairment     Concentration: distractable  Abstraction: abstract  Insight: The patient shows little insight    OR Fair  Judgement: is psychologically impaired OR  Fair    Assessment/Plan:   not changed    Continue close observation    Patient is seen in coverage for Dr. Freddy Rascon. Nurses report that she has mainly stayed to her self.   She complains of initial mid and terminal insomnia. She says she gets thoughts to hurt herself and also thoughts to hurt a specific relative who was not around. She denies problems with constipation or other side effects. She says she is fatigued and tired during the daytime because of her sleep problems. She endorses continued depressed moods and does not contract for safety.

## 2022-04-05 NOTE — GROUP NOTE
IP  GROUP DOCUMENTATION INDIVIDUAL                                                                          Group Therapy Note    Date: 4/4/2022    Group Start Time: 2000  Group End Time: 2015  Group Topic: Medication    [facility] 1 ADULT CHEM DEP    Naz Velarde RN    IP [facility] GROUP DOCUMENTATION GROUP    Group Therapy Note    Attendees: 2       Attendance: Attended    Patient's Goal:  Understanding the importance of medication compliance. Interventions/techniques: Informed    Follows Directions: Followed directions    Interactions: Interacted appropriately    Mental Status: Calm    Behavior/appearance: Cooperative    Goals Achieved:  Identified feelings      Sarah Barclay RN

## 2022-04-06 PROCEDURE — G0378 HOSPITAL OBSERVATION PER HR: HCPCS

## 2022-04-06 PROCEDURE — 74011250637 HC RX REV CODE- 250/637: Performed by: PSYCHIATRY & NEUROLOGY

## 2022-04-06 PROCEDURE — 99231 SBSQ HOSP IP/OBS SF/LOW 25: CPT | Performed by: PSYCHIATRY & NEUROLOGY

## 2022-04-06 PROCEDURE — 65220000003 HC RM SEMIPRIVATE PSYCH

## 2022-04-06 RX ADMIN — THERA TABS 1 TABLET: TAB at 08:22

## 2022-04-06 RX ADMIN — ARIPIPRAZOLE 2 MG: 2 TABLET ORAL at 20:14

## 2022-04-06 RX ADMIN — AMLODIPINE BESYLATE 10 MG: 10 TABLET ORAL at 08:22

## 2022-04-06 RX ADMIN — SERTRALINE 100 MG: 50 TABLET, FILM COATED ORAL at 20:14

## 2022-04-06 NOTE — BSMART NOTE
ART THERAPY GROUP PROGRESS NOTE    Group time:1330    The patient did not awaken/get up when called for group.

## 2022-04-06 NOTE — BSMART NOTE
Social Work Group 4/6/22   Coping strategies   Attendance  attended   Number of participants 8   Time in 1:40   Time out 2:30   Total Time 50   Interaction Listen and  Gave feedback   Interventions/  techniques Informed, supported  and encouraged           Pt. Was observed listening. Pt. provided feedback to her peers . Pt. Was given positive praise from FAYE Sanchez MA, LMHP-R

## 2022-04-06 NOTE — GROUP NOTE
IP  GROUP DOCUMENTATION INDIVIDUAL                                                                          Group Therapy Note    Date: 4/5/2022    Group Start Time: 1930  Group End Time: 2000  Group Topic: Medication    [facility] 1 ADULT CHEM DEP    Jimmy Martinez RN    IP [address] GROUP DOCUMENTATION GROUP    Group Therapy Note    Attendees: 3         Attendance: Attended    Patient's Goal:  Understanding the importance of medication compliance. Interventions/techniques: Informed    Follows Directions: Followed directions    Interactions: Interacted appropriately    Mental Status: Calm    Behavior/appearance: Cooperative    Goals Achieved:  Identified feelings        Jennifer Crabtree RN

## 2022-04-06 NOTE — BSMART NOTE
Pt 65-year-old female with history of Major depressive disorder, single episode, without psychotic symptoms, severe.  Pt. was admitted to this facility for ideations to harm self. Pt. has been hospitalized at this facility in the past.      Pt.'s case was discussed . Pt. continues to appear depressed. SW Contact: SW met with pt to discuss dc planning. Pt. Expressed to feeling tired, sleepy and depressed. Pt vented about her niece and feels the niece is not trust worthy. Pt. Discussed stressors such as finances and the up keep  Of her home. SW discussed coping strategies, encouraged pt to attend groups and discussed safety plan. Pt appears depressed and little insight. SW will contniue to provide pt with support towards dc panning.        Winsome Clark MA, LMHP-R

## 2022-04-06 NOTE — PROGRESS NOTES
Behavioral Health Progress Note    Admit Date: 3/30/2022  Hospital day 7    Vitals : Patient Vitals for the past 8 hrs:   BP Temp Pulse Resp   04/06/22 0745 114/79 97.6 °F (36.4 °C) 66 16     Labs:  No results found for this or any previous visit (from the past 24 hour(s)). Meds:   Current Facility-Administered Medications   Medication Dose Route Frequency    loperamide (IMODIUM) capsule 2 mg  2 mg Oral Q6H PRN    therapeutic multivitamin (THERAGRAN) tablet 1 Tablet  1 Tablet Oral DAILY    sertraline (ZOLOFT) tablet 100 mg  100 mg Oral QHS    ARIPiprazole (ABILIFY) tablet 2 mg  2 mg Oral QHS    traZODone (DESYREL) tablet 25 mg  25 mg Oral QHS PRN    hydrOXYzine pamoate (VISTARIL) capsule 25 mg  25 mg Oral Q6H PRN    amLODIPine (NORVASC) tablet 10 mg  10 mg Oral DAILY    acetaminophen (TYLENOL) tablet 500 mg  500 mg Oral Q6H PRN      Hospital Problems: Principal Problem:    Major depressive disorder, single episode, severe with anxious distress ([facility] [address] (3/31/2022)    Active Problems:    Hypertension (3/31/2022)      Mild protein-calorie malnutrition ([facility] [address] (4/1/2022)        Subjective:   Medication side effects: none  tired during day    Mental Status Exam  Sensorium: alert  Orientation: only aware of  time, place and person  Relations: passive  Eye Contact: intense  Appearance: is tense  Thought Process: slow rate of thoughts and poor abstract reasoning/computation   Thought Content: paranoia   Suicidal: ideas   Homicidal: none   Mood: unhappy   Affect: constricted  Memory: is impaired and is remote     Concentration: distractable  Abstraction: concrete  Insight: The patient shows little insight    OR Fair  Judgement: is psychologically impaired OR  Fair    Assessment/Plan:   not changed    Continue close observation,   Nurses report that the patient has been quite cooperative. She says she feels tired especially in the morning time but then it is hard to sleep at night.   She says she lays there and has trouble falling asleep. She then thinks that the medicines must be too strong during the daytime because she is so tired but it appears it is more likely that she needs to be able to sleep. She describes depression and anxiety. We will continue medications as is as she does need to have the medications worked for her.

## 2022-04-06 NOTE — PROGRESS NOTES
Problem: Falls - Risk of  Goal: *Absence of Falls  Description: Document Bret Ya Fall Risk and appropriate interventions in the flowsheet. Outcome: Progressing Towards Goal  Note: Fall Risk Interventions:            Medication Interventions: Teach patient to arise slowly                   Problem: Suicide  Goal: *STG: Remains safe in hospital  Description: Pt. will be free from falls,self harm or harming others while in the hospital.  Outcome: Progressing Towards Goal  Goal: *STG: Attends activities and groups  Description: Pt. will attend at least 2 x day to his daily activities and groups. Outcome: Progressing Towards Goal  Goal: *STG/LTG: Complies with medication therapy  Description: Pt. will take his daily scheduled medications. Outcome: Progressing Towards Goal   Patient states she os doing better, she is a little drowsy this morning. She is compliant with her medications. Denies SI. Attending her groups. Voiced no complaints.

## 2022-04-06 NOTE — BH NOTES
Bedside and Verbal shift change report given to this writer (oncoming nurse) by Rhonda Yonug RN (offgoing nurse). Report included the following information SBAR, Kardex and Recent Results. Pt sleeping upon this writer arrival, will monitor.

## 2022-04-07 PROCEDURE — 74011250637 HC RX REV CODE- 250/637: Performed by: PSYCHIATRY & NEUROLOGY

## 2022-04-07 PROCEDURE — G0378 HOSPITAL OBSERVATION PER HR: HCPCS

## 2022-04-07 PROCEDURE — 99231 SBSQ HOSP IP/OBS SF/LOW 25: CPT | Performed by: PSYCHIATRY & NEUROLOGY

## 2022-04-07 PROCEDURE — 65220000003 HC RM SEMIPRIVATE PSYCH

## 2022-04-07 RX ADMIN — THERA TABS 1 TABLET: TAB at 08:13

## 2022-04-07 RX ADMIN — ARIPIPRAZOLE 2 MG: 2 TABLET ORAL at 20:01

## 2022-04-07 RX ADMIN — SERTRALINE 100 MG: 50 TABLET, FILM COATED ORAL at 20:01

## 2022-04-07 RX ADMIN — AMLODIPINE BESYLATE 10 MG: 10 TABLET ORAL at 08:13

## 2022-04-07 NOTE — BH NOTES
Pt. remain sad and unhappy. She selectively interact  With peers. She is meal, medication and group compliant. She denies SI or HI. Will continue to monitor for safety and provide support as needed.

## 2022-04-07 NOTE — BSMART NOTE
Social Work Group Progress Note    Time: 2867    Attendance:  1/2    Participation Level: Engaged     Observation: Did engage as requested

## 2022-04-07 NOTE — BSMART NOTE
Pt 48-year-old female with history of Major depressive disorder, single episode, without psychotic symptoms, severe.  Pt. was admitted to this facility for ideations to harm self. Pt. has been hospitalized at this facility in the past.      SW Contact:  SW met with pt to discuss dc planning. Pt expressed to feeling a little better emotionally but physically tired. Pt. Discussed anxiety about her financial situation. SW suggested pt to utilize coping strategies. Pt. depressed she is going to allow her nephew that she trust to help her with finances. Pt appears anxious and continues to have fair insight. SW will continue to assist pt with dc planning.        Marc Morales MA, LMHP-R

## 2022-04-07 NOTE — PROGRESS NOTES
Nutrition Assessment     Type and Reason for Visit: Reassess,Consult    Nutrition Recommendations/Plan:  -Continue to monitor PO intake and compliance with supplement     Nutrition Assessment:  Visited pt in room in common room. Patient was sitting up and able to communicate. Patient stated appetite has improved and eats more than >75% of most meals. PTA pt reported decreased appetite and appropriately 10lbs weight loss r/t stressful situation at home. Patient denies any concerns with d/c/n/v, chewing difficulties nor food allergies. Patient requested to update flavor of oral supplements and updated food preferences; DA was contacted. Patient reported no additional concerns or questions at this time. Malnutrition Assessment:  Malnutrition Status: Mild malnutrition     Estimated Daily Nutrient Needs:  Energy (kcal):  7314-5286  Protein (g):  53-66       Fluid (ml/day):  3103-7091    Nutrition Related Findings:  NKFA. No BM documented since admission but no additional reports of d/c/n/v.   Pertinent Medications: amlodipine, Imodium, theragran. Current Nutrition Therapies:  ADULT DIET Regular  ADULT ORAL NUTRITION SUPPLEMENT Breakfast, Lunch; Standard High Calorie/High Protein    Anthropometric Measures:  · Height:  5' 2.5\" (158.8 cm)  · Current Body Wt:  65.8 kg (145 lb 1 oz)  · BMI: 26.1    Nutrition Diagnosis:   · Unintended weight loss related to psychological cause or life stress as evidenced by weight loss 7.5% in 3 months,poor intake prior to admission      Nutrition Intervention:  Food and/or Nutrient Delivery: Continue current diet,Continue oral nutrition supplement,Vitamin supplement  Nutrition Education and Counseling: No recommendations at this time,Education not indicated  Coordination of Nutrition Care: Continue to monitor while inpatient    Goals:  PO nutrition intake will meet >75% of patient estimated nutritional needs by next follow up date.        Nutrition Monitoring and Evaluation: Behavioral-Environmental Outcomes: None identified  Food/Nutrient Intake Outcomes: Food and nutrient intake,Supplement intake,Vitamin/mineral intake  Physical Signs/Symptoms Outcomes: Meal time behavior,Nutrition focused physical findings,Weight    Discharge Planning:    Continue current diet,Continue oral nutrition supplement     Electronically signed by Chaparrita Scott RD on 4/7/2022 at 4:23 PM    Contact Number: [phone]

## 2022-04-07 NOTE — PROGRESS NOTES
Behavioral Health Progress Note    Admit Date: 3/30/2022  Hospital day 8    Vitals : Patient Vitals for the past 8 hrs:   Height   04/07/22 1613 5' 2.5\" (1.588 m)     Labs:  No results found for this or any previous visit (from the past 24 hour(s)). Meds:   Current Facility-Administered Medications   Medication Dose Route Frequency    loperamide (IMODIUM) capsule 2 mg  2 mg Oral Q6H PRN    therapeutic multivitamin (THERAGRAN) tablet 1 Tablet  1 Tablet Oral DAILY    sertraline (ZOLOFT) tablet 100 mg  100 mg Oral QHS    ARIPiprazole (ABILIFY) tablet 2 mg  2 mg Oral QHS    traZODone (DESYREL) tablet 25 mg  25 mg Oral QHS PRN    hydrOXYzine pamoate (VISTARIL) capsule 25 mg  25 mg Oral Q6H PRN    amLODIPine (NORVASC) tablet 10 mg  10 mg Oral DAILY    acetaminophen (TYLENOL) tablet 500 mg  500 mg Oral Q6H PRN      Hospital Problems: Principal Problem:    Major depressive disorder, single episode, severe with anxious distress ([facility] [address] (3/31/2022)    Active Problems:    Hypertension (3/31/2022)      Mild protein-calorie malnutrition ([facility] [address] (4/1/2022)        Subjective:   Medication side effects: none  none    Mental Status Exam  Sensorium: alert  Orientation: only aware of  place and person  Relations: guarded and passive  Eye Contact: poor  Appearance: is bizarre  Thought Process: slow rate of thoughts and poor abstract reasoning/computation   Thought Content: paucity of thought content   Suicidal: ideas   Homicidal: denies   Mood: is irritable, displays anhedonia and unhappy   Affect: odd, constricted  Memory: shows no evidence of impairment     Concentration: distractable  Abstraction: concrete  Insight: The patient shows little insight    OR Poor  Judgement: is psychologically impaired OR  Poor    Assessment/Plan:   not changed    Continue close observation,       Nurses say that the patient does not participate much. She tends to go back and stay in her room and isolate self away from people.   Patient says she is \"doing okay\". She was complaining she is up at night and has trouble sleeping during the evening time. She does lay down on her bed often during the day. Nurses are concerned that she does not seem to be motivated to get better to get out of hospital.  They say she does not seem to have much to go home to and does not seem to be working on anything. Patient claims that she still gets thoughts of suicide and does not have anything to live for.   We will continue medication as is

## 2022-04-07 NOTE — BSMART NOTE
ART THERAPY GROUP PROGRESS NOTE    Group time: 9:45      The patient did not awaken/get up when called for group.

## 2022-04-07 NOTE — PROGRESS NOTES
Problem: Suicide  Goal: *STG: Remains safe in hospital  Description: Pt. will be free from falls,self harm or harming others while in the hospital.  Outcome: Progressing Towards Goal  Goal: *STG: Attends activities and groups  Description: Pt. will attend at least 2 x day to his daily activities and groups. Outcome: Progressing Towards Goal  Goal: *STG/LTG: Complies with medication therapy  Description: Pt. will take his daily scheduled medications. Outcome: Progressing Towards Goal     Problem: Falls - Risk of  Goal: *Absence of Falls  Description: Document Gloria Chrystal Fall Risk and appropriate interventions in the flowsheet. Outcome: Progressing Towards Goal  Note: Fall Risk Interventions:            Medication Interventions: Teach patient to arise slowly            Patient states she is doing ok. Affect is flat, mood is calm. States she is still depressed. Encouraged to continue to attend groups. Medication compliant.

## 2022-04-07 NOTE — BSMART NOTE
ART THERAPY GROUP PROGRESS NOTE    PATIENT SCHEDULED FOR GROUP AT: 12:15    ATTENDANCE: Full    PARTICIPATION LEVEL: Needs extra prompting     ATTENTION LEVEL : Able to focus on task    FOCUS: Gratitude    SYMBOLIC & THEMATIC CONTENT AS NOTED IN IMAGERY:   PT was calm and had a blunted affect. She was focused on the task but did not fully complete the task. She needed extra prompting to answer questions. Pt was able to identify relevant and personal things to be grateful for such as \"clean water, friends, and shelter. \"

## 2022-04-07 NOTE — GROUP NOTE
TIMOTHY  GROUP DOCUMENTATION INDIVIDUAL                                                                          Group Therapy Note    Date: 4/7/2022    Group Start Time: 0915  Group End Time: 0930  Group Topic: Nursing    [facility], RN    IP [address] [facility] GROUP DOCUMENTATION GROUP    Group Therapy Note    Attendees: 2         Attendance: Did not attend        Estephania Stanford RN

## 2022-04-07 NOTE — BH NOTES
Pt. Is a soft spoken individual who seems to be Preoccupied with a constant deep thinking or day dreaming. Many times pt. is restless and sadden through out the evening 3 p-11 pm. I observed pt. demonstrating a loss of interest, and pleasure. Pt. Does not  smile often Although,, she showed some enthusiasm when a friend brought her some snacks today. Whenever she asked for her snacks, there were some happy stimlus  when she received them.

## 2022-04-08 PROCEDURE — 99231 SBSQ HOSP IP/OBS SF/LOW 25: CPT | Performed by: PSYCHIATRY & NEUROLOGY

## 2022-04-08 PROCEDURE — G0378 HOSPITAL OBSERVATION PER HR: HCPCS

## 2022-04-08 PROCEDURE — 65220000003 HC RM SEMIPRIVATE PSYCH

## 2022-04-08 PROCEDURE — 74011250637 HC RX REV CODE- 250/637: Performed by: PSYCHIATRY & NEUROLOGY

## 2022-04-08 RX ADMIN — AMLODIPINE BESYLATE 10 MG: 10 TABLET ORAL at 08:09

## 2022-04-08 RX ADMIN — SERTRALINE 100 MG: 50 TABLET, FILM COATED ORAL at 20:40

## 2022-04-08 RX ADMIN — ARIPIPRAZOLE 2 MG: 2 TABLET ORAL at 20:40

## 2022-04-08 RX ADMIN — THERA TABS 1 TABLET: TAB at 08:09

## 2022-04-08 NOTE — PROGRESS NOTES
Behavioral Health Progress Note    Admit Date: 3/30/2022  Hospital day 9    Vitals : No data found. Labs:  No results found for this or any previous visit (from the past 24 hour(s)). Meds:   Current Facility-Administered Medications   Medication Dose Route Frequency    loperamide (IMODIUM) capsule 2 mg  2 mg Oral Q6H PRN    therapeutic multivitamin (THERAGRAN) tablet 1 Tablet  1 Tablet Oral DAILY    sertraline (ZOLOFT) tablet 100 mg  100 mg Oral QHS    ARIPiprazole (ABILIFY) tablet 2 mg  2 mg Oral QHS    traZODone (DESYREL) tablet 25 mg  25 mg Oral QHS PRN    hydrOXYzine pamoate (VISTARIL) capsule 25 mg  25 mg Oral Q6H PRN    amLODIPine (NORVASC) tablet 10 mg  10 mg Oral DAILY    acetaminophen (TYLENOL) tablet 500 mg  500 mg Oral Q6H PRN      Hospital Problems: Principal Problem:    Major depressive disorder, single episode, severe with anxious distress ([facility] [address] (3/31/2022)    Active Problems:    Hypertension (3/31/2022)      Mild protein-calorie malnutrition ([facility] [address] (4/1/2022)        Subjective:   Medication side effects: none  none    Mental Status Exam  Sensorium: alert  Orientation: only aware of  place and person  Relations: guarded, passive  Eye Contact: appropriate  Appearance: shows no evidence of impairment  Thought Process: normal rate of thoughts and poor abstract reasoning/computation   Thought Content: no evidence of impairment   Suicidal: ideas   Homicidal: none   Mood: is anxious and unhappy   Affect: congruent  Memory: shows no evidence of impairment     Concentration: fair  Abstraction: concrete  Insight: The patient shows little insight    OR Fair  Judgement: is psychologically impaired OR  Fair    Assessment/Plan:   not changed    Continue close observation,    Patient does not seem to have made much progress toward movement toward home. There is a scheduled meeting with patient, nephew and  for Monday to discuss the need for discharge and outpatient follow-up.   She does say that she feels the need that she has to have a family member stay with her when she goes home and none of that has been arranged. She continues to say it makes her extremely anxious at the prospect of going home as she says she continues to feel depressed. She continues to say that she does not believe that she would be safe to take care of herself at home alone over the weekend.

## 2022-04-08 NOTE — BSMART NOTE
Pt 21-year-old female with history of Major depressive disorder, single episode, without psychotic symptoms, severe.  Pt. was admitted to this facility for ideations to harm self. Pt. has been hospitalized at this facility in the past.    Pt.'s case was discussed this a.m . Pt. continues to be depressed Pt. At dc will return to her current home. SW Contact: SW met with pt. to discuss dc planning. Pt. Expressed to feeling worried and anxious about finances and transitioning home. SW provided pt with positive feedback. SW discussed coping  Skills, supportive services and aftercare. Pt. Informed Sw her nephew is a support system to her. SW and pt contacted pts nephew. The nephew plans to come on 4/11/22 for a family session @ 11:00 a/m The nephew stated he will assist his aunt in the community. Pt. Will also be referred to mental health skill building services. Pt. 's mood is slowly improving, anxious and fair insight. SW will continue to provide pt with support towards dc planning.      Kyler Alston MA, LMHP-R

## 2022-04-08 NOTE — BSMART NOTE
ART THERAPY GROUP PROGRESS NOTE    PATIENT SCHEDULED FOR GROUP AT: 12:00    ATTENDANCE: Full    PARTICIPATION LEVEL: Does not engage in the art process or gives up easily. ATTENTION LEVEL : Unable to attend to task at hand. FOCUS: Goals    SYMBOLIC & THEMATIC CONTENT AS NOTED IN IMAGERY:   Pt was calm and had a flat affect. She attempted the art task but did not finish it. During group discussion when she was asked if she had a goal she stated \"no not at this time. \" She did not further elaborate.

## 2022-04-08 NOTE — PROGRESS NOTES
Problem: Suicide  Goal: *STG/LTG: Complies with medication therapy  Description: Pt. will take his daily scheduled medications. Outcome: Progressing Towards Goal     Problem: Nutrition Deficit  Goal: *Optimize nutritional status  Outcome: Progressing Towards Goal as evidence by consuming no less than 85% of each meal tray as well as snacks this shift. Patient has attended all groups and has been active participant. Patient continues to appear with sad affect and voiced continuing anxiety regarding discharge to home \"right now. \"  Patient was asked \"do you feel confident that you would be safe and not harm yourself? \"  Patient moved head back and forth using nonverbal communication to mean \"no. \"  Patient did voice \"a little better\" when returned to unit from setting up family session with nephew and  for Monday. Patient has been compliant with scheduled medications and has not required PRN medications this shift. Patient has eaten meals and snacks and has been free from falls and harm. Will continue to monitor and provide interventions and reassurance as appropriate.

## 2022-04-08 NOTE — BSMART NOTE
ART THERAPY GROUP PROGRESS NOTE    Group time: 10:00    The patient did not awaken/get up when called for group.

## 2022-04-09 PROCEDURE — 74011250637 HC RX REV CODE- 250/637: Performed by: PSYCHIATRY & NEUROLOGY

## 2022-04-09 PROCEDURE — G0378 HOSPITAL OBSERVATION PER HR: HCPCS

## 2022-04-09 PROCEDURE — 65220000003 HC RM SEMIPRIVATE PSYCH

## 2022-04-09 PROCEDURE — 99233 SBSQ HOSP IP/OBS HIGH 50: CPT | Performed by: PSYCHIATRY & NEUROLOGY

## 2022-04-09 RX ADMIN — THERA TABS 1 TABLET: TAB at 08:31

## 2022-04-09 RX ADMIN — ARIPIPRAZOLE 2 MG: 2 TABLET ORAL at 20:15

## 2022-04-09 RX ADMIN — SERTRALINE 100 MG: 50 TABLET, FILM COATED ORAL at 20:15

## 2022-04-09 RX ADMIN — AMLODIPINE BESYLATE 10 MG: 10 TABLET ORAL at 08:31

## 2022-04-09 NOTE — PROGRESS NOTES
Patient observed on the unit interacting well with staff but minimally with peers. Patient was very quiet and withdrawn only engaging when initiated by others. She consumed all meals and snacks and tolerated them well. Patient is complaint with medications and no adverse reactions noted. Will continue to monitor for medication effectiveness and safety.

## 2022-04-09 NOTE — BH NOTES
Patient appeared to have slept  7 1/2 hrs. She continues to yell out @ times while remaining asleep.

## 2022-04-09 NOTE — PROGRESS NOTES
conducted an initial consultation and Spiritual Assessment for [facility], who is a 66 y.o.,female. Patients Primary Language is: [place]. According to the patients EMR Synagogue Affiliation is: Other. The reason the Patient came to the hospital is:   Patient Active Problem List    Diagnosis Date Noted    Mild protein-calorie malnutrition ([facility] [address] 04/01/2022    Major depressive disorder, single episode, severe with anxious distress ([facility] [address] 03/31/2022    Hypertension 03/31/2022        The  provided the following Interventions:  Initiated a relationship of care and support. Explored issues of vicenta, belief, spirituality and Sabianism/ritual needs while hospitalized. Listened empathically. Provided information about Spiritual Care Services. Offered prayer and assurance of continued prayers on patient's behalf. Chart reviewed. The following outcomes where achieved:  Patient shared limited information about both their medical narrative and spiritual journey/beliefs. Patient processed feeling about current hospitalization. Patient expressed gratitude for 's visit. Assessment:  Patient does not have any Sabianism/cultural needs that will affect patients preferences in health care. There are no spiritual or Sabianism issues which require intervention at this time. Plan:  Chaplains will continue to follow and will provide pastoral care on an as needed/requested basis.  recommends bedside caregivers page  on duty if patient shows signs of acute spiritual or emotional distress.       [address] [facility]   [phone]

## 2022-04-09 NOTE — PROGRESS NOTES
[address]  Inpatient Progress Note     Date of Service: 04/09/22  Hospital Day: 10     Subjective/Interval History   04/09/22    Treatment Team Notes:  Notes reviewed and/or discussed and report that Nara Sawyer is a 79-year-old female admitted for increasing depression and SI. Patient interview: Nara Saweyr was interviewed by this writer today. Patient reports continuing depression but improved as compared to time of admission. Objective     Visit Vitals  /72 (BP 1 Location: Right arm, BP Patient Position: Sitting)   Pulse 69   Temp 97.6 °F (36.4 °C)   Resp 18   Ht 5' 2.5\" (1.588 m)   Wt 65.8 kg (145 lb)   SpO2 97%   Breastfeeding No   BMI 26.10 kg/m²       No results found for this or any previous visit (from the past 24 hour(s)). Mental Status Examination     Appearance/Hygiene 66 y.o. BLACK/ female  Hygiene:  In hospital gown   Behavior/Social Relatedness Appropriate   Musculoskeletal Gait/Station: appropriate  Tone (flaccid, cogwheeling, spastic): not assessed  Psychomotor (hyperkinetic, hypokinetic): calm   Involuntary movements (tics, dyskinesias, akathisa, stereotypies): none   Speech   Rate, rhythm, volume, fluency and articulation are appropriate   Mood   depressed   Affect    Blunted   Thought Process Linear and goal directed   Thought Content and Perceptual Disturbances Denies self-injurious behavior (SIB), suicidal ideation (SI), aggressive behavior or homicidal ideation (HI)    Denies auditory and visual hallucinations   Sensorium and Cognition  Grossly intact   Insight  poor   Judgment poor        Assessment/Plan      Psychiatric Diagnoses:   Patient Active Problem List   Diagnosis Code    Major depressive disorder, single episode, severe with anxious distress (HCC) F32.2    Hypertension I10    Mild protein-calorie malnutrition (HCC) E44.1       Psychosocial and contextual factors: Unchanged    Level of impairment/disability: Moderate    Flash Sage is a 66 y.o. who is currently managed for depression with SI.      1.  Continue current medications unchanged given mild improvement in symptoms without side effects.   2.  Disposition/Discharge Date: TBD    MD DR. C[facility]  Psychiatry

## 2022-04-09 NOTE — PROGRESS NOTES
Problem: Suicide  Goal: *STG/LTG: Complies with medication therapy  Description: Pt. will take his daily scheduled medications. Outcome: Progressing Towards Goal     Problem: Falls - Risk of  Goal: *Absence of Falls  Description: Document Shanita Skill Fall Risk and appropriate interventions in the flowsheet. Outcome: Progressing Towards Goal  Note: Fall Risk Interventions:  Medication Interventions: Teach patient to arise slowly       Patient has been pleasant and cooperative this shift. Patient continues to present with sad affect. Patient was able to speak with  this shift and was tearful after visit, however stated to this nurse \"that was just what I needed. \"  Patient has been compliant with scheduled medications and has not required PRN medications. Patient has eaten all meals and snacks to include ensure brought with meal trays. Patient has interacted appropriately with staff and peers and has been free from falls and harm. One of patient's sister-in-laws called this shift to check on her and conversation appeared appropriate and supportive. Will continue to monitor and provide interventions as appropriate.

## 2022-04-10 PROCEDURE — 65220000003 HC RM SEMIPRIVATE PSYCH

## 2022-04-10 PROCEDURE — G0378 HOSPITAL OBSERVATION PER HR: HCPCS

## 2022-04-10 PROCEDURE — 99233 SBSQ HOSP IP/OBS HIGH 50: CPT | Performed by: PSYCHIATRY & NEUROLOGY

## 2022-04-10 PROCEDURE — 74011250637 HC RX REV CODE- 250/637: Performed by: PSYCHIATRY & NEUROLOGY

## 2022-04-10 RX ADMIN — SERTRALINE 100 MG: 50 TABLET, FILM COATED ORAL at 20:32

## 2022-04-10 RX ADMIN — AMLODIPINE BESYLATE 10 MG: 10 TABLET ORAL at 08:22

## 2022-04-10 RX ADMIN — ARIPIPRAZOLE 2 MG: 2 TABLET ORAL at 20:32

## 2022-04-10 RX ADMIN — THERA TABS 1 TABLET: TAB at 08:22

## 2022-04-10 NOTE — BH NOTES
Patient accepting dinner. Patient resting in bed. Patient sitting quietly in day area watching TV. Patient sitting on bed, quiet. Patient talking to a peer in the day area. Patient pleasant and cooperative this shift.

## 2022-04-10 NOTE — PROGRESS NOTES
[address]  Inpatient Progress Note     Date of Service: 04/10/22  Hospital Day: 11     Subjective/Interval History   04/10/22    Treatment Team Notes:  Notes reviewed and/or discussed and report that Reanna Hammond is a 69-year-old female admitted for increasing depression and SI. Patient interview: Reanna Hammond was interviewed by this writer today. Patient reports continued depression and did not give a clear response regarding SI. When I tried to talk to her about increasing the dose of Abilify for improved efficacy, she stated that she would prefer not to change anything. Objective     Visit Vitals  /73 (BP 1 Location: Left upper arm, BP Patient Position: At rest)   Pulse 67   Temp 97.3 °F (36.3 °C)   Resp 20   Ht 5' 2.5\" (1.588 m)   Wt 65.8 kg (145 lb)   SpO2 97%   Breastfeeding No   BMI 26.10 kg/m²       No results found for this or any previous visit (from the past 24 hour(s)). Mental Status Examination     Appearance/Hygiene 66 y.o. BLACK/ female  Hygiene:  In hospital gown   Behavior/Social Relatedness Appropriate   Musculoskeletal Gait/Station: appropriate  Tone (flaccid, cogwheeling, spastic): not assessed  Psychomotor (hyperkinetic, hypokinetic): calm   Involuntary movements (tics, dyskinesias, akathisa, stereotypies): none   Speech   Rate, rhythm, volume, fluency and articulation are appropriate   Mood   depressed   Affect    Blunted   Thought Process Linear and goal directed   Thought Content and Perceptual Disturbances Denies self-injurious behavior (SIB), suicidal ideation (SI), aggressive behavior or homicidal ideation (HI)    Denies auditory and visual hallucinations   Sensorium and Cognition  Grossly intact   Insight  poor   Judgment poor        Assessment/Plan      Psychiatric Diagnoses:   Patient Active Problem List   Diagnosis Code    Major depressive disorder, single episode, severe with anxious distress ([facility] [address] F32.2    Hypertension I10    Mild protein-calorie malnutrition (HCC) E44.1       Psychosocial and contextual factors: Unchanged    Level of impairment/disability: Moderate    Flash Sage is a 66 y.o. who is currently managed for depression with SI.    1.  Continue current medication regimen unchanged given mild improvement in symptoms without side effects.   2.  Disposition/Discharge Date: TBD    Simran Mario MD DR. [facility]  Psychiatry

## 2022-04-10 NOTE — PROGRESS NOTES
Problem: Suicide  Goal: *STG: Attends activities and groups  Description: Pt. will attend at least 2 x day to his daily activities and groups. Outcome: Progressing Towards Goal     Problem: Falls - Risk of  Goal: *Absence of Falls  Description: Document Liane Ivys Fall Risk and appropriate interventions in the flowsheet. Outcome: Progressing Towards Goal  Note: Fall Risk Interventions:   Medication Interventions: Teach patient to arise slowly       Patient has been in day area more this shift than yesterday shift with this nurse. Patient continues to display sad affect with little interaction with peers but does come to staff when needing to [facility]. \" Patient has attended all groups and activities and has been compliant with scheduled medications. Patient has not received PRN medications this shift. Patient has eaten all meals and snacks and has been free from falls and harm. Will continue to monitor and provide interventions as appropriate.

## 2022-04-10 NOTE — BH NOTES
Bedside and Verbal shift change report given to this writer (oncoming nurse) by Arrie Cogan RN (offgoing nurse). Report included the following information SBAR, Kardex and Recent Results.

## 2022-04-10 NOTE — BSMART NOTE
ART THERAPY GROUP PROGRESS NOTE    PATIENT SCHEDULED FOR GROUP AT: 9:30    ATTENDANCE: Full    PARTICIPATION LEVEL: Participates fully in the art process    ATTENTION LEVEL : Able to focus on task    FOCUS: Mindfulness    SYMBOLIC & THEMATIC CONTENT AS NOTED IN IMAGERY:   Pt was calm and had a blunted affect. Pt was focused on the art task. She kept to herself during the art process. She was able to complete the art task. During group discussion she did not want to talk about her artwork.

## 2022-04-11 VITALS
BODY MASS INDEX: 25.69 KG/M2 | RESPIRATION RATE: 16 BRPM | HEART RATE: 82 BPM | HEIGHT: 63 IN | DIASTOLIC BLOOD PRESSURE: 70 MMHG | WEIGHT: 145 LBS | TEMPERATURE: 97 F | OXYGEN SATURATION: 97 % | SYSTOLIC BLOOD PRESSURE: 126 MMHG

## 2022-04-11 PROCEDURE — 99238 HOSP IP/OBS DSCHRG MGMT 30/<: CPT | Performed by: PSYCHIATRY & NEUROLOGY

## 2022-04-11 PROCEDURE — G0378 HOSPITAL OBSERVATION PER HR: HCPCS

## 2022-04-11 PROCEDURE — 74011250637 HC RX REV CODE- 250/637: Performed by: PSYCHIATRY & NEUROLOGY

## 2022-04-11 RX ORDER — THERA TABS 400 MCG
1 TAB ORAL DAILY
Qty: 15 TABLET | Refills: 1 | Status: SHIPPED | OUTPATIENT
Start: 2022-04-12

## 2022-04-11 RX ORDER — SERTRALINE HYDROCHLORIDE 100 MG/1
100 TABLET, FILM COATED ORAL
Qty: 15 TABLET | Refills: 1 | Status: SHIPPED | OUTPATIENT
Start: 2022-04-11

## 2022-04-11 RX ORDER — AMLODIPINE BESYLATE 10 MG/1
10 TABLET ORAL DAILY
Qty: 15 TABLET | Refills: 1 | Status: SHIPPED | OUTPATIENT
Start: 2022-04-12

## 2022-04-11 RX ORDER — ARIPIPRAZOLE 2 MG/1
2 TABLET ORAL
Qty: 15 TABLET | Refills: 1 | Status: SHIPPED | OUTPATIENT
Start: 2022-04-11

## 2022-04-11 RX ADMIN — AMLODIPINE BESYLATE 10 MG: 10 TABLET ORAL at 08:15

## 2022-04-11 RX ADMIN — THERA TABS 1 TABLET: TAB at 08:15

## 2022-04-11 NOTE — PROGRESS NOTES
Problem: Suicide  Goal: *STG: Remains safe in hospital  Description: Pt. will be free from falls,self harm or harming others while in the hospital.  Outcome: Progressing Towards Goal  Goal: *STG: Attends activities and groups  Description: Pt. will attend at least 2 x day to his daily activities and groups. Outcome: Progressing Towards Goal  Goal: *STG/LTG: Complies with medication therapy  Description: Pt. will take his daily scheduled medications. Outcome: Progressing Towards Goal     Problem: Falls - Risk of  Goal: *Absence of Falls  Description: Document [place] Fall Risk and appropriate interventions in the flowsheet. Outcome: Progressing Towards Goal  Note: Fall Risk Interventions:            Medication Interventions: Teach patient to arise slowly       Patient is pleasant, medication compliant. She is able to contract for safety. Encouraged to attend groups. 1:1 as needed for emotional support.

## 2022-04-11 NOTE — DISCHARGE INSTRUCTIONS
BEHAVIORAL HEALTH NURSING DISCHARGE NOTE      The following personal items collected during your admission are returned to you:   Dental Appliance: Dental Appliances: None  Vision: Visual Aid: Glasses  Hearing Aid:    Jewelry: Jewelry: None  Clothing: Clothing: None  Other Valuables: Other Valuables: None  Valuables sent to safe: Personal Items Sent to Safe:  (None)      PATIENT INSTRUCTIONS:             The discharge information has been reviewed with the patient. The patient verbalized understanding.         Patient armband removed and shredded

## 2022-04-11 NOTE — BSMART NOTE
Pt 65-year-old female with history of Major depressive disorder, single episode, without psychotic symptoms, severe. Pt. was admitted to this facility for ideations to harm self. Pt. has been hospitalized at this facility in the past.      Pt.'s case was discussed . Pt will be dc today. SW Contact/DC Plan : SW met with pt to discuss dc planning. Pt. Expressed being upset because nephew did not show up for 11:00 family session. SW assisted pt with contacting the nephew. Pt left a message. Pt.'s nephew will pick pt up . Pt.'s nephew and brother came for family session @ 3:00 today. SW met with pt and her family. The pt. Was able to address her stressors. The nephew stated he will be assisting pt with some of her stressors  at home. The nephew also encouraged pt to come and stay with him and his family , so she will not have to say in the home alone>  The nephew also encouraged pt to allow him to mediate between other family members, she has conflict with. SW discussed community programs such as senior station , mental health skill building services and aftercare. Pt. expressed she would like to stay in services with [facility]. Pt.. is willing to receive [facility] mental health services with [facility]. Pt. Has an appointment with Dr. Sherine Farmer on 5/10/22@ 11:00a.m [facility] [address] [address] . [facility] health skill [address]. Pt. Denies ideations and hallucinations.      Ko Ford MA, LMHP-R

## 2022-04-11 NOTE — PROGRESS NOTES
Patient discharge at this time, verbalized understanding of follow up appt. All personal items given to pt.

## 2022-04-13 NOTE — DISCHARGE SUMMARY
[facility]    Name:  Allie Mccormack  MR#:   [id number]  :  1950  ACCOUNT #:  [de-identified]  ADMIT DATE:  2022  DISCHARGE DATE:  2022    SIGNIFICANT FINDINGS:  History and physical exam was performed shortly after the patient was admitted to the facility, attention invited to this document, a place where the reasons for which the patient presented herself to [facility], being brought into the facility by her nephew who had become increasingly concerned about the patient's depression, its intensity and also the presence of suicidal thoughts. The patient, who was medically cleared, was then presented to the physician on-call who admitted the patient to my service. When the patient was seen by the undersigned, she described a history of depression which appears to be multifactorial but mostly associated to her having to take care of her older brother who is having significant medical problems and who had required as a result to stay in her home. The patient described that having to take care of her brother, even though she cares very much about him, has made her very unhappy because of the intensity of the care that her brother requires. Nonetheless, because of the problems that he was having, he required apparently to be hospitalized, and it is our understanding that after being considered to be medically stable, he was able to be discharged to a different place than the patient's home. Nonetheless, she felt very guilty about what had happened to him, had become depressed and increasingly suicidal.  The patient had mentioned also that she was being treated at [facility] and had been prescribed with sertraline 100 mg at bedtime and also aripiprazole which she had refused to take.   Tolerance to sertraline was very good; however, it became obvious that the patient did require antidepressant treatment augmentation with a prescription for the atypical.  As to the reason for which she decided not to take it, she said it was because of concerns associated to potential for side effects described with prescriptions for aripiprazole. Physical exam in the emergency department was that of a patient with a blood pressure of 163/88, pulse 110, respirations 18, temperature 37.5, oxygen saturation rate 97%. She was described as having negative physical exam with exception of her psychiatric examination that showed the presence of sadness. Multiple labs had been performed at the time of the patient's evaluation at the emergency department, all of them clearly described in the dictated admission note. However, also for the purpose of this discharge summary, the reader is advised that the patient had a CBC that basically showed normal test results. Urinalysis showed the presence of 4-10 wbc's with 2+ bacteria and 0-3 red cells, a small amount of leukocyte esterase, however, negative nitrites. Complete metabolic panel had showed normal electrolytes, blood sugar of 108, normal kidney functioning tests and normal liver function tests. Lipase normal at 76. Troponin high sensitivity 4. Alcohol levels below 3. Urine drug screen negative. COVID rapid test showed negative results from a nasopharyngeal sample. COURSE DURING HOSPITALIZATION AND TREATMENT:  The patient was admitted to the adult program, a place where she remained until she was able to be discharged safely to continue with further outpatient care at [facility]. She was seen on daily individual psychiatric basis and was also referred to the groups within context of the program.  When the undersigned met with the patient, a strong suggestion followed for her to begin to take her aripiprazole as prescribed with the patient agreeing to a lower dose of only 2 mg daily which she tolerated fairly well.   Attempts to increase the dose to 5 mg to improve antidepressant augmentation were, however, refused by the patient. During the hospital stay, the patient had several tests performed including a lipid panel that showed triglycerides of 113, total cholesterol 248, HDL 49, cholesterol-HDL ratio 5.1 and LDL of 176.4. Hemoglobin A1c 6.0%. For completeness, a TSH was performed which showed the patient had normal results at 1.34 international units/mL. An electrocardiogram taking into consideration the patient's age was also performed, this showing a normal sinus rhythm with nonspecific T-wave abnormalities, heart rate of 81 beats per minute,  and QTc 453. While in the facility, the patient had a physical examination which was performed by Geovanna Dixon, physician's assistant, that basically confirmed the findings upon the patient's examination in the emergency department. It is to be mentioned that while she was also at the hospital, the patient had a consultation by our Nutritional Services, attention being invited to the consultation by Mirna Fontanez, registered nutritionist, which is self-explanatory. Treatment with multivitamins was basically then recommended. Oral nutritional supplement was also suggested and prescribed. With this combination of treatment approaches, the patient's depression was able to improve. It is to be mentioned that on the day of discharge, a rather productive session followed with the assigned inpatient  Ms. Kyler Alston, with her note being self-explanatory.   The meeting was with the patient's nephew who agreed to help the patient with her coming back to stay with him and his family as a way for her not to be by herself in addition to further outpatient care by the patient's primary care physician of choice to continue in addition to her being followed by Dr. Jarred Polanco with [facility], appointment 05/10/2022 at 11 a.m. at [facility] for further outpatient psychiatric care.    At the time of discharge, the patient showed no evidence of psychosis, she showed no evidence of cognitive impairment, and she was considered to be safe to be able to go home. Specifically, she denied any suicidal thoughts, plans and/or intentions and also denied any thoughts of harming anyone else. FINAL DIAGNOSES:  AXIS I:  Major depressive disorder, single episode, without psychotic symptoms. AXIS II:  Noncontributory. AXIS III:  Hypertension, under treatment. Dyslipidemia by history, not currently confirmed. History of adverse reactions versus allergies to Aleve, Bactrim, diphenhydramine, Feldene and penicillins. DISPOSITION:  The patient was discharged home with outpatient referrals to the primary care physician of her choice, the same as Dr. Sanam Sawyer with [facility] for further psychiatric care. PRESCRIPTIONS UPON DISCHARGE:  Prescriptions for two weeks with one refill provided for  1. Norvasc 10 mg tablets one every day. 2.  Aripiprazole 2 mg every night. 3.  Zoloft 100 mg every night at bedtime. 4.  Pravachol 40 mg a day (the patient had prescriptions at home). 5.  Aspirin 81 mg every morning (the patient has prescriptions at home). 6.  Therapeutic multivitamins one everyday. No evidence of medication-related side effects noted upon discharge. PROGNOSIS:  Fairly good with outpatient treatment compliance.       Tammy Correa MD, LFAPA      [id number]  D:  04/12/2022 16:19  T:  04/12/2022 20:33  JOB #:  [id number]

## 2022-05-07 ENCOUNTER — APPOINTMENT (OUTPATIENT)
Dept: CT IMAGING | Age: 72
End: 2022-05-07
Attending: STUDENT IN AN ORGANIZED HEALTH CARE EDUCATION/TRAINING PROGRAM
Payer: MEDICARE

## 2022-05-07 ENCOUNTER — HOSPITAL ENCOUNTER (EMERGENCY)
Age: 72
Discharge: HOME | End: 2022-05-07
Attending: STUDENT IN AN ORGANIZED HEALTH CARE EDUCATION/TRAINING PROGRAM
Payer: MEDICARE

## 2022-05-07 ENCOUNTER — APPOINTMENT (OUTPATIENT)
Dept: GENERAL RADIOLOGY | Age: 72
End: 2022-05-07
Attending: STUDENT IN AN ORGANIZED HEALTH CARE EDUCATION/TRAINING PROGRAM
Payer: MEDICARE

## 2022-05-07 VITALS
SYSTOLIC BLOOD PRESSURE: 148 MMHG | DIASTOLIC BLOOD PRESSURE: 78 MMHG | TEMPERATURE: 98.4 F | BODY MASS INDEX: 26.68 KG/M2 | WEIGHT: 145 LBS | OXYGEN SATURATION: 100 % | RESPIRATION RATE: 12 BRPM | HEART RATE: 95 BPM | HEIGHT: 62 IN

## 2022-05-07 DIAGNOSIS — R53.83 FATIGUE, UNSPECIFIED TYPE: Primary | ICD-10-CM

## 2022-05-07 DIAGNOSIS — E87.6 HYPOKALEMIA: ICD-10-CM

## 2022-05-07 DIAGNOSIS — R53.1 WEAKNESS: ICD-10-CM

## 2022-05-07 DIAGNOSIS — R26.89 IMBALANCE: ICD-10-CM

## 2022-05-07 LAB
ALBUMIN SERPL-MCNC: 3.7 G/DL (ref 3.4–5)
ALBUMIN/GLOB SERPL: 0.7 {RATIO} (ref 0.8–1.7)
ALP SERPL-CCNC: 96 U/L (ref 45–117)
ALT SERPL-CCNC: 19 U/L (ref 13–56)
ANION GAP SERPL CALC-SCNC: 6 MMOL/L (ref 3–18)
APPEARANCE UR: CLEAR
AST SERPL-CCNC: 14 U/L (ref 10–38)
ATRIAL RATE: 92 BPM
BACTERIA URNS QL MICRO: ABNORMAL /HPF
BASOPHILS # BLD: 0 K/UL (ref 0–0.1)
BASOPHILS NFR BLD: 0 % (ref 0–2)
BILIRUB SERPL-MCNC: 0.3 MG/DL (ref 0.2–1)
BILIRUB UR QL: NEGATIVE
BUN SERPL-MCNC: 20 MG/DL (ref 7–18)
BUN/CREAT SERPL: 30 (ref 12–20)
CALCIUM SERPL-MCNC: 10.1 MG/DL (ref 8.5–10.1)
CALCULATED P AXIS, ECG09: 52 DEGREES
CALCULATED R AXIS, ECG10: 15 DEGREES
CALCULATED T AXIS, ECG11: 50 DEGREES
CHLORIDE SERPL-SCNC: 103 MMOL/L (ref 100–111)
CO2 SERPL-SCNC: 29 MMOL/L (ref 21–32)
COLOR UR: YELLOW
CREAT SERPL-MCNC: 0.67 MG/DL (ref 0.6–1.3)
DIAGNOSIS, 93000: NORMAL
DIFFERENTIAL METHOD BLD: ABNORMAL
EOSINOPHIL # BLD: 0.1 K/UL (ref 0–0.4)
EOSINOPHIL NFR BLD: 2 % (ref 0–5)
EPITH CASTS URNS QL MICRO: ABNORMAL /LPF (ref 0–5)
ERYTHROCYTE [DISTWIDTH] IN BLOOD BY AUTOMATED COUNT: 14.7 % (ref 11.6–14.5)
GLOBULIN SER CALC-MCNC: 5.1 G/DL (ref 2–4)
GLUCOSE SERPL-MCNC: 104 MG/DL (ref 74–99)
GLUCOSE UR STRIP.AUTO-MCNC: NEGATIVE MG/DL
HCT VFR BLD AUTO: 37.3 % (ref 35–45)
HGB BLD-MCNC: 12.5 G/DL (ref 12–16)
HGB UR QL STRIP: NEGATIVE
IMM GRANULOCYTES # BLD AUTO: 0 K/UL (ref 0–0.04)
IMM GRANULOCYTES NFR BLD AUTO: 0 % (ref 0–0.5)
KETONES UR QL STRIP.AUTO: NEGATIVE MG/DL
LEUKOCYTE ESTERASE UR QL STRIP.AUTO: ABNORMAL
LYMPHOCYTES # BLD: 1.6 K/UL (ref 0.9–3.6)
LYMPHOCYTES NFR BLD: 24 % (ref 21–52)
MAGNESIUM SERPL-MCNC: 2 MG/DL (ref 1.6–2.6)
MCH RBC QN AUTO: 29.4 PG (ref 24–34)
MCHC RBC AUTO-ENTMCNC: 33.5 G/DL (ref 31–37)
MCV RBC AUTO: 87.8 FL (ref 78–100)
MONOCYTES # BLD: 0.6 K/UL (ref 0.05–1.2)
MONOCYTES NFR BLD: 9 % (ref 3–10)
NEUTS SEG # BLD: 4.4 K/UL (ref 1.8–8)
NEUTS SEG NFR BLD: 65 % (ref 40–73)
NITRITE UR QL STRIP.AUTO: NEGATIVE
NRBC # BLD: 0 K/UL (ref 0–0.01)
NRBC BLD-RTO: 0 PER 100 WBC
P-R INTERVAL, ECG05: 158 MS
PH UR STRIP: 5 [PH] (ref 5–8)
PLATELET # BLD AUTO: 198 K/UL (ref 135–420)
PMV BLD AUTO: 11.2 FL (ref 9.2–11.8)
POTASSIUM SERPL-SCNC: 3.4 MMOL/L (ref 3.5–5.5)
PROT SERPL-MCNC: 8.8 G/DL (ref 6.4–8.2)
PROT UR STRIP-MCNC: NEGATIVE MG/DL
Q-T INTERVAL, ECG07: 378 MS
QRS DURATION, ECG06: 92 MS
QTC CALCULATION (BEZET), ECG08: 467 MS
RBC # BLD AUTO: 4.25 M/UL (ref 4.2–5.3)
RBC #/AREA URNS HPF: NEGATIVE /HPF (ref 0–5)
SODIUM SERPL-SCNC: 138 MMOL/L (ref 136–145)
SP GR UR REFRACTOMETRY: 1.01 (ref 1–1.03)
TROPONIN-HIGH SENSITIVITY: 5 NG/L (ref 0–54)
UROBILINOGEN UR QL STRIP.AUTO: 0.2 EU/DL (ref 0.2–1)
VENTRICULAR RATE, ECG03: 92 BPM
WBC # BLD AUTO: 6.7 K/UL (ref 4.6–13.2)
WBC URNS QL MICRO: ABNORMAL /HPF (ref 0–4)

## 2022-05-07 PROCEDURE — 71045 X-RAY EXAM CHEST 1 VIEW: CPT

## 2022-05-07 PROCEDURE — 74011250637 HC RX REV CODE- 250/637: Performed by: STUDENT IN AN ORGANIZED HEALTH CARE EDUCATION/TRAINING PROGRAM

## 2022-05-07 PROCEDURE — 84484 ASSAY OF TROPONIN QUANT: CPT

## 2022-05-07 PROCEDURE — 80053 COMPREHEN METABOLIC PANEL: CPT

## 2022-05-07 PROCEDURE — 83735 ASSAY OF MAGNESIUM: CPT

## 2022-05-07 PROCEDURE — 85025 COMPLETE CBC W/AUTO DIFF WBC: CPT

## 2022-05-07 PROCEDURE — 93005 ELECTROCARDIOGRAM TRACING: CPT

## 2022-05-07 PROCEDURE — 81001 URINALYSIS AUTO W/SCOPE: CPT

## 2022-05-07 PROCEDURE — 70450 CT HEAD/BRAIN W/O DYE: CPT

## 2022-05-07 PROCEDURE — 99285 EMERGENCY DEPT VISIT HI MDM: CPT

## 2022-05-07 RX ORDER — LANOLIN ALCOHOL/MO/W.PET/CERES
400 CREAM (GRAM) TOPICAL
Status: COMPLETED | OUTPATIENT
Start: 2022-05-07 | End: 2022-05-07

## 2022-05-07 RX ORDER — POTASSIUM CHLORIDE 20 MEQ/1
40 TABLET, EXTENDED RELEASE ORAL
Status: COMPLETED | OUTPATIENT
Start: 2022-05-07 | End: 2022-05-07

## 2022-05-07 RX ADMIN — Medication 400 MG: at 16:13

## 2022-05-07 RX ADMIN — POTASSIUM CHLORIDE 40 MEQ: 1500 TABLET, EXTENDED RELEASE ORAL at 16:13

## 2022-05-07 NOTE — ED PROVIDER NOTES
EMERGENCY DEPARTMENT HISTORY AND PHYSICAL EXAM      Date: 5/7/2022  Patient Name: Julie Gowers [facility]    History of Presenting Illness     Chief Complaint   Patient presents with    Fatigue    Dizziness       History (Context): Manisha Sylvester is a 66 y.o. female with a past medical history significant for hypertension, hyperlipidemia, depression comes into the ED today due to imbalance and fatigue. Patient states symptoms have been ongoing for weeks however seem to have worsened over the past 2 weeks. Patient admits to feeling off balance and was recently admitted to the hospital for mental health complaint and since that time has not felt her normal self. She does admit to fatigue and generalized weakness but denies any dizziness, lightheadedness, numbness, tingling, or unilateral weakness. Patient denies previous history of CVA. She states symptoms are severe in quality. Denies any alleviating or exacerbating factors. Does state that when this happened previously she was found to have a low potassium and symptoms resolved following repletion. Patient denies taking medication for treatment of her symptoms prior to arrival.      PCP: Armando Torres MD    Current Facility-Administered Medications   Medication Dose Route Frequency Provider Last Rate Last Admin    potassium chloride (K-DUR, KLOR-CON M20) SR tablet 40 mEq  40 mEq Oral NOW Merna Kovacs, DO        magnesium oxide (MAG-OX) tablet 400 mg  400 mg Oral NOW Merna Kovacs, DO         Current Outpatient Medications   Medication Sig Dispense Refill    amLODIPine (NORVASC) 10 mg tablet Take 1 Tablet by mouth daily. Indications: high blood pressure 15 Tablet 1    ARIPiprazole (ABILIFY) 2 mg tablet Take 1 Tablet by mouth nightly. Indications: additional treatment for major depressive disorder 15 Tablet 1    sertraline (ZOLOFT) 100 mg tablet Take 1 Tablet by mouth nightly.  Indications: major depressive disorder 15 Tablet 1    therapeutic multivitamin (THERAGRAN) tablet Take 1 Tablet by mouth daily. Indications: treatment to prevent vitamin deficiency 15 Tablet 1    pravastatin (PRAVACHOL) 40 mg tablet Take 40 mg by mouth nightly. Indications: high cholesterol      aspirin 81 mg chewable tablet Take 81 mg by mouth daily. Past History     Past Medical History:   Past Medical History:   Diagnosis Date    Cystitis     Depression     High cholesterol     Hypertension     Near syncope 01/13/2020    Scalp contusion 01/17/2020       Past Surgical History:  Past Surgical History:   Procedure Laterality Date    COLONOSCOPY N/A 1/27/2020    COLONOSCOPY with polypectomy performed by Naun Aguero MD at [facility]       Family History:  History reviewed. No pertinent family history. Social History:   Social History     Tobacco Use    Smoking status: Never Smoker    Smokeless tobacco: Never Used   Substance Use Topics    Alcohol use: Not Currently    Drug use: Never       Allergies: Allergies   Allergen Reactions    Aleve [Naproxen Sodium] Unable to Obtain    Bactrim [Sulfamethoprim] Diarrhea    Diphenhydramine Drowsiness     Patient states \"It makes me sleepy\"    Feldene [Piroxicam] Unknown (comments)     Shaking      Penicillins Rash       PMH, PSH, family history, social history, allergies reviewed with the patient with significant items noted above. Review of Systems   Review of Systems   Constitutional: Negative for chills and fever. HENT: Negative for sore throat. Eyes: Negative for visual disturbance. Respiratory: Negative for shortness of breath. Cardiovascular: Negative for chest pain. Gastrointestinal: Negative for abdominal pain, nausea and vomiting. Genitourinary: Negative for difficulty urinating. Musculoskeletal: Negative for myalgias. Skin: Negative for rash. Neurological: Positive for weakness (Generalized).  Negative for dizziness, syncope, facial asymmetry, speech difficulty, light-headedness, numbness and headaches. Imbalance       Physical Exam     Vitals:    05/07/22 1236   BP: (!) 168/76   Pulse: 95   Resp: 12   Temp: 98.4 °F (36.9 °C)   SpO2: 97%   Weight: 65.8 kg (145 lb)   Height: 5' 2\" (1.575 m)       Physical Exam  Vitals and nursing note reviewed. Constitutional:       General: She is not in acute distress. Appearance: Normal appearance. She is not ill-appearing or toxic-appearing. HENT:      Head: Normocephalic and atraumatic. Mouth/Throat:      Mouth: Mucous membranes are moist.   Eyes:      General: No scleral icterus. Conjunctiva/sclera: Conjunctivae normal.   Cardiovascular:      Rate and Rhythm: Normal rate and regular rhythm. Comments: Normal peripheral perfusion  Pulmonary:      Effort: Pulmonary effort is normal. No respiratory distress. Abdominal:      General: There is no distension. Palpations: Abdomen is soft. Tenderness: There is no abdominal tenderness. Musculoskeletal:         General: No deformity. Normal range of motion. Cervical back: Normal range of motion and neck supple. Skin:     General: Skin is warm and dry. Findings: No rash. Neurological:      General: No focal deficit present. Mental Status: She is alert and oriented to person, place, and time. Mental status is at baseline. Cranial Nerves: No cranial nerve deficit. Sensory: No sensory deficit. Motor: No weakness. Coordination: Coordination normal.      Gait: Gait normal.      Comments: Able to ambulate appropriately without any ataxia or imbalance. Psychiatric:         Mood and Affect: Mood normal.         Thought Content:  Thought content normal.         Diagnostic Study Results     Labs -     Recent Results (from the past 12 hour(s))   EKG, 12 LEAD, INITIAL    Collection Time: 05/07/22  1:01 PM   Result Value Ref Range    Ventricular Rate 92 BPM    Atrial Rate 92 BPM    P-R Interval 158 ms    QRS Duration 92 ms Q-T Interval 378 ms    QTC Calculation (Bezet) 467 ms    Calculated P Axis 52 degrees    Calculated R Axis 15 degrees    Calculated T Axis 50 degrees    Diagnosis       Normal sinus rhythm  Cannot rule out Anterior infarct , age undetermined  Abnormal ECG  When compared with ECG of 31-MAR-2022 13:15,  No significant change was found     CBC WITH AUTOMATED DIFF    Collection Time: 05/07/22  1:11 PM   Result Value Ref Range    WBC 6.7 4.6 - 13.2 K/uL    RBC 4.25 4.20 - 5.30 M/uL    HGB 12.5 12.0 - 16.0 g/dL    HCT 37.3 35.0 - 45.0 %    MCV 87.8 78.0 - 100.0 FL    MCH 29.4 24.0 - 34.0 PG    MCHC 33.5 31.0 - 37.0 g/dL    RDW 14.7 (H) 11.6 - 14.5 %    PLATELET 336 061 - 124 K/uL    MPV 11.2 9.2 - 11.8 FL    NRBC 0.0 0  WBC    ABSOLUTE NRBC 0.00 0.00 - 0.01 K/uL    NEUTROPHILS 65 40 - 73 %    LYMPHOCYTES 24 21 - 52 %    MONOCYTES 9 3 - 10 %    EOSINOPHILS 2 0 - 5 %    BASOPHILS 0 0 - 2 %    IMMATURE GRANULOCYTES 0 0.0 - 0.5 %    ABS. NEUTROPHILS 4.4 1.8 - 8.0 K/UL    ABS. LYMPHOCYTES 1.6 0.9 - 3.6 K/UL    ABS. MONOCYTES 0.6 0.05 - 1.2 K/UL    ABS. EOSINOPHILS 0.1 0.0 - 0.4 K/UL    ABS. BASOPHILS 0.0 0.0 - 0.1 K/UL    ABS. IMM. GRANS. 0.0 0.00 - 0.04 K/UL    DF AUTOMATED     METABOLIC PANEL, COMPREHENSIVE    Collection Time: 05/07/22  1:11 PM   Result Value Ref Range    Sodium 138 136 - 145 mmol/L    Potassium 3.4 (L) 3.5 - 5.5 mmol/L    Chloride 103 100 - 111 mmol/L    CO2 29 21 - 32 mmol/L    Anion gap 6 3.0 - 18 mmol/L    Glucose 104 (H) 74 - 99 mg/dL    BUN 20 (H) 7.0 - 18 MG/DL    Creatinine 0.67 0.6 - 1.3 MG/DL    BUN/Creatinine ratio 30 (H) 12 - 20      GFR est AA >60 >60 ml/min/1.73m2    GFR est non-AA >60 >60 ml/min/1.73m2    Calcium 10.1 8.5 - 10.1 MG/DL    Bilirubin, total 0.3 0.2 - 1.0 MG/DL    ALT (SGPT) 19 13 - 56 U/L    AST (SGOT) 14 10 - 38 U/L    Alk.  phosphatase 96 45 - 117 U/L    Protein, total 8.8 (H) 6.4 - 8.2 g/dL    Albumin 3.7 3.4 - 5.0 g/dL    Globulin 5.1 (H) 2.0 - 4.0 g/dL    A-G Ratio 0.7 (L) 0.8 - 1.7     TROPONIN-HIGH SENSITIVITY    Collection Time: 05/07/22  1:11 PM   Result Value Ref Range    Troponin-High Sensitivity 5 0 - 54 ng/L   MAGNESIUM    Collection Time: 05/07/22  1:11 PM   Result Value Ref Range    Magnesium 2.0 1.6 - 2.6 mg/dL      Labs Reviewed   CBC WITH AUTOMATED DIFF - Abnormal; Notable for the following components:       Result Value    RDW 14.7 (*)     All other components within normal limits   METABOLIC PANEL, COMPREHENSIVE - Abnormal; Notable for the following components:    Potassium 3.4 (*)     Glucose 104 (*)     BUN 20 (*)     BUN/Creatinine ratio 30 (*)     Protein, total 8.8 (*)     Globulin 5.1 (*)     A-G Ratio 0.7 (*)     All other components within normal limits   TROPONIN-HIGH SENSITIVITY   MAGNESIUM   URINALYSIS W/ RFLX MICROSCOPIC       Radiologic Studies -   XR CHEST PORT   Final Result   No acute cardiopulmonary process. CT HEAD WO CONT    (Results Pending)     CT Results  (Last 48 hours)    None        CXR Results  (Last 48 hours)               05/07/22 1314  XR CHEST PORT Final result    Impression:  No acute cardiopulmonary process. Narrative:  ONE VIEW CHEST RADIOGRAPH        INDICATION: dizziness, eval for abnormalities. Dizziness and weakness for 3   days. COMPARISON: Chest radiograph 2/4/2022. TECHNIQUE: AP view of the chest       FINDINGS:       LINES/TUBES: None. MEDIASTINUM: Cardiac silhouette is within normal limits. LUNGS: Lungs are clear. BONES/OTHER: No acute osseous abnormality. The laboratory results, imaging results, and other diagnostic exams were reviewed in the EMR. Medical Decision Making   I am the first provider for this patient. I reviewed the vital signs, available nursing notes, past medical history, past surgical history, family history and social history. Vital Signs-Reviewed the patient's vital signs.     ED EKG interpretation:  Rhythm: normal sinus rhythm; and regular . Rate (approx.): 92; Axis: normal; P wave: normal; QRS interval: normal ; ST/T wave: non-specific changes; Other findings: abnormal ekg. This EKG was interpreted by Kristal Haywood D.O. Records Reviewed: Personally, on initial evaluation    MDM:   Skylar Castillo presents with complaint of imbalance and generalized weakness  DDX includes but is not limited to: Electrolyte abnormality, cranial hemorrhage, medical screening exam    Patient overall well-appearing, no acute distress, vital signs grossly within normal limits. Patient without any focal neurodeficits on exam.  Able to ambulate without any broad-based gait or ataxia. Will obtain lab work and imaging for further evaluation of patients complaint. Will continue to monitor and evaluate patient while in the ED. Orders as below:  Orders Placed This Encounter    XR CHEST PORT    CT HEAD WO CONT    CBC WITH AUTOMATED DIFF    COMPREHENSIVE METABOLIC PANEL    TROPONIN-HIGH SENSITIVITY    MAGNESIUM    URINALYSIS W/ RFLX MICROSCOPIC    EKG, 12 LEAD, INITIAL    potassium chloride (K-DUR, KLOR-CON M20) SR tablet 40 mEq    magnesium oxide (MAG-OX) tablet 400 mg        ED Course:   ED Course as of 05/07/22 2044   Sat May 07, 2022   1353 Patient's lab work significant for potassium 3.4, otherwise labs grossly within normal limits. We will replete patient's electrolytes. We will continue to monitor patient. [DV]   1430 Patient CT scan does not show any acute intracranial abnormalities. Chest x-ray does not show any acute cardiopulmonary abnormalities. UA negative for infection. Will reevaluate patient for further disposition. [DV]   1440 On reevaluation patient continues to appear well. Without any focal neurodeficits. Will discharge patient home with return precautions and follow-up recommendations. Patient verbalized understanding is without any further questions.  [DV]      ED Course User Index  [DV] Linda Gutierrez DO Procedures:  Procedures        Diagnosis and Disposition     CLINICAL IMPRESSION:  No diagnosis found. Current Discharge Medication List          Disposition: Home    Patient condition at time of disposition: STable    DISCHARGE NOTE:   Pt has been reexamined. Patient has no new complaints, changes, or physical findings. Care plan outlined and precautions discussed. Results were reviewed with the patient. All medications were reviewed with the patient. All of pt's questions and concerns were addressed. Alarm symptoms and return precautions associated with chief complaint and evaluation were reviewed with the patient in detail. The patient demonstrated adequate understanding. Patient was instructed to follow up with PCP, Neuro as well as strict return precautions to the ED upon further deterioration. Patient is ready to go home. The patient is happy with this plan          Dragon Disclaimer     Please note that this dictation was completed with [employer], the computer voice recognition software. Quite often unanticipated grammatical, syntax, homophones, and other interpretive errors are inadvertently transcribed by the computer software. Please disregard these errors. Please excuse any errors that have escaped final proofreading. Jo-Ann CHOPRA

## 2022-05-07 NOTE — ED TRIAGE NOTES
Pt states \"my equilibrium is off\". Pt reports dizziness and weakness times 3 days. Pt states her weakness is greater on the left side .

## (undated) DEVICE — FLEX ADVANTAGE 3000CC: Brand: FLEX ADVANTAGE

## (undated) DEVICE — BASIN EMESIS 500CC ROSE 250/CS 60/PLT: Brand: MEDEGEN MEDICAL PRODUCTS, LLC

## (undated) DEVICE — SYR 20ML LL STRL LF --

## (undated) DEVICE — SOLUTION IRRIG 1000ML H2O STRL BLT

## (undated) DEVICE — MEDI-VAC SUCTION HIGH CAPACITY: Brand: CARDINAL HEALTH

## (undated) DEVICE — GOWN ISOL IMPERV UNIV, DISP, OPEN BACK, BLUE --

## (undated) DEVICE — STERILE POLYISOPRENE POWDER-FREE SURGICAL GLOVES: Brand: PROTEXIS

## (undated) DEVICE — FORCEPS BX L240CM JAW DIA2.8MM L CAP W/ NDL MIC MESH TOOTH

## (undated) DEVICE — FLUFF AND POLYMER UNDERPAD,EXTRA HEAVY: Brand: WINGS

## (undated) DEVICE — SNARE ENDOSCP M L240CM W27MM SHTH DIA2.4MM CHN 2.8MM HEX

## (undated) DEVICE — AIRLIFE™ NASAL OXYGEN CANNULA CURVED, NONFLARED TIP WITH 14 FOOT (4.3 M) CRUSH-RESISTANT TUBING, OVER-THE-EAR STYLE: Brand: AIRLIFE™

## (undated) DEVICE — TRAP SPEC COLL POLYP POLYSTYR --

## (undated) DEVICE — ENDOSCOPY PUMP TUBING/ CAP SET: Brand: ERBE

## (undated) DEVICE — SYR 10ML LUER LOK 1/5ML GRAD --

## (undated) DEVICE — CANNULA ORIG TL CLR W FOAM CUSHIONS AND 14FT SUPL TB 3 CHN

## (undated) DEVICE — SNARE POLYP M W27MMXL240CM OVL STIFF DISP CAPTIVATOR

## (undated) DEVICE — CATHETER SUCT TR FL TIP 14FR W/ O CTRL

## (undated) DEVICE — MEDI-VAC NON-CONDUCTIVE SUCTION TUBING: Brand: CARDINAL HEALTH

## (undated) DEVICE — SYRINGE MED 25GA 3ML L5/8IN SUBQ PLAS W/ DETACH NDL SFTY

## (undated) DEVICE — BITE BLOCK ENDOSCP UNIV AD 6 TO 9.4 MM

## (undated) DEVICE — AIRLIFE™ NASAL OXYGEN CANNULA CURVED, FLARED TIP WITH 14 FOOT (4.3 M) CRUSH-RESISTANT TUBING, OVER-THE-EAR STYLE: Brand: AIRLIFE™

## (undated) DEVICE — FCPS RAD JAW 4LC 240CM W/NDL -- BX/20 RADIAL JAW 4

## (undated) DEVICE — GAUZE,SPONGE,4"X4",16PLY,STRL,LF,10/TRAY: Brand: MEDLINE

## (undated) DEVICE — SYR 50ML SLIP TIP NSAF LF STRL --